# Patient Record
Sex: MALE | Race: WHITE | NOT HISPANIC OR LATINO | Employment: OTHER | URBAN - METROPOLITAN AREA
[De-identification: names, ages, dates, MRNs, and addresses within clinical notes are randomized per-mention and may not be internally consistent; named-entity substitution may affect disease eponyms.]

---

## 2017-04-13 ENCOUNTER — HOSPITAL ENCOUNTER (EMERGENCY)
Facility: HOSPITAL | Age: 57
Discharge: PSYCHIATRIC OR SUBSTANCE USE FACILITY | End: 2017-04-14
Attending: EMERGENCY MEDICINE
Payer: MEDICAID

## 2017-04-13 DIAGNOSIS — R45.851 SUICIDAL IDEATION: Primary | ICD-10-CM

## 2017-04-13 DIAGNOSIS — T50.902A DRUG OVERDOSE, INTENTIONAL SELF-HARM, INITIAL ENCOUNTER: ICD-10-CM

## 2017-04-13 DIAGNOSIS — R41.82 ALTERED MENTAL STATE: ICD-10-CM

## 2017-04-13 LAB
ALBUMIN SERPL BCP-MCNC: 3.4 G/DL
ALP SERPL-CCNC: 83 U/L
ALT SERPL W/O P-5'-P-CCNC: 18 U/L
AMPHET+METHAMPHET UR QL: NEGATIVE
ANION GAP SERPL CALC-SCNC: 6 MMOL/L
APAP SERPL-MCNC: <3 UG/ML
AST SERPL-CCNC: 14 U/L
BARBITURATES UR QL SCN>200 NG/ML: NEGATIVE
BASOPHILS # BLD AUTO: 0 K/UL
BASOPHILS NFR BLD: 0.1 %
BENZODIAZ UR QL SCN>200 NG/ML: NORMAL
BILIRUB SERPL-MCNC: 0.5 MG/DL
BILIRUB UR QL STRIP: NEGATIVE
BUN SERPL-MCNC: 15 MG/DL
BZE UR QL SCN: NEGATIVE
CALCIUM SERPL-MCNC: 9 MG/DL
CANNABINOIDS UR QL SCN: NEGATIVE
CHLORIDE SERPL-SCNC: 104 MMOL/L
CLARITY UR: CLEAR
CO2 SERPL-SCNC: 29 MMOL/L
COLOR UR: YELLOW
CREAT SERPL-MCNC: 1.3 MG/DL
CREAT UR-MCNC: 83 MG/DL
DIFFERENTIAL METHOD: ABNORMAL
EOSINOPHIL # BLD AUTO: 0.4 K/UL
EOSINOPHIL NFR BLD: 3.2 %
ERYTHROCYTE [DISTWIDTH] IN BLOOD BY AUTOMATED COUNT: 16.1 %
EST. GFR  (AFRICAN AMERICAN): >60 ML/MIN/1.73 M^2
EST. GFR  (NON AFRICAN AMERICAN): >60 ML/MIN/1.73 M^2
ETHANOL SERPL-MCNC: <10 MG/DL
GLUCOSE SERPL-MCNC: 110 MG/DL
GLUCOSE UR QL STRIP: NEGATIVE
HCT VFR BLD AUTO: 41.9 %
HGB BLD-MCNC: 13.4 G/DL
HGB UR QL STRIP: NEGATIVE
KETONES UR QL STRIP: NEGATIVE
LEUKOCYTE ESTERASE UR QL STRIP: NEGATIVE
LYMPHOCYTES # BLD AUTO: 1 K/UL
LYMPHOCYTES NFR BLD: 8.1 %
MCH RBC QN AUTO: 27.7 PG
MCHC RBC AUTO-ENTMCNC: 31.9 %
MCV RBC AUTO: 87 FL
METHADONE UR QL SCN>300 NG/ML: NEGATIVE
MONOCYTES # BLD AUTO: 0.4 K/UL
MONOCYTES NFR BLD: 3.2 %
NEUTROPHILS # BLD AUTO: 10.2 K/UL
NEUTROPHILS NFR BLD: 85.4 %
NITRITE UR QL STRIP: NEGATIVE
OPIATES UR QL SCN: NEGATIVE
PCP UR QL SCN>25 NG/ML: NEGATIVE
PH UR STRIP: 7 [PH] (ref 5–8)
PLATELET # BLD AUTO: 218 K/UL
PMV BLD AUTO: 7.7 FL
POTASSIUM SERPL-SCNC: 4.9 MMOL/L
PROT SERPL-MCNC: 7 G/DL
PROT UR QL STRIP: NEGATIVE
RBC # BLD AUTO: 4.83 M/UL
SALICYLATES SERPL-MCNC: <5 MG/DL
SODIUM SERPL-SCNC: 139 MMOL/L
SP GR UR STRIP: 1.01 (ref 1–1.03)
TOXICOLOGY INFORMATION: NORMAL
TSH SERPL DL<=0.005 MIU/L-ACNC: 0.71 UIU/ML
URN SPEC COLLECT METH UR: NORMAL
UROBILINOGEN UR STRIP-ACNC: NEGATIVE EU/DL
WBC # BLD AUTO: 11.9 K/UL

## 2017-04-13 PROCEDURE — 84443 ASSAY THYROID STIM HORMONE: CPT

## 2017-04-13 PROCEDURE — 80307 DRUG TEST PRSMV CHEM ANLYZR: CPT | Mod: 59

## 2017-04-13 PROCEDURE — 80329 ANALGESICS NON-OPIOID 1 OR 2: CPT

## 2017-04-13 PROCEDURE — 93005 ELECTROCARDIOGRAM TRACING: CPT

## 2017-04-13 PROCEDURE — 80320 DRUG SCREEN QUANTALCOHOLS: CPT

## 2017-04-13 PROCEDURE — 96374 THER/PROPH/DIAG INJ IV PUSH: CPT

## 2017-04-13 PROCEDURE — 85025 COMPLETE CBC W/AUTO DIFF WBC: CPT

## 2017-04-13 PROCEDURE — 99285 EMERGENCY DEPT VISIT HI MDM: CPT | Mod: 25

## 2017-04-13 PROCEDURE — 63600175 PHARM REV CODE 636 W HCPCS: Performed by: EMERGENCY MEDICINE

## 2017-04-13 PROCEDURE — 82570 ASSAY OF URINE CREATININE: CPT

## 2017-04-13 PROCEDURE — 80175 DRUG SCREEN QUAN LAMOTRIGINE: CPT

## 2017-04-13 PROCEDURE — 25000003 PHARM REV CODE 250: Performed by: EMERGENCY MEDICINE

## 2017-04-13 PROCEDURE — 93010 ELECTROCARDIOGRAM REPORT: CPT | Mod: ,,, | Performed by: INTERNAL MEDICINE

## 2017-04-13 PROCEDURE — 80053 COMPREHEN METABOLIC PANEL: CPT

## 2017-04-13 PROCEDURE — 81003 URINALYSIS AUTO W/O SCOPE: CPT

## 2017-04-13 PROCEDURE — 96361 HYDRATE IV INFUSION ADD-ON: CPT

## 2017-04-13 PROCEDURE — 80307 DRUG TEST PRSMV CHEM ANLYZR: CPT

## 2017-04-13 RX ORDER — METOPROLOL TARTRATE 50 MG/1
50 TABLET ORAL 2 TIMES DAILY
Status: ON HOLD | COMMUNITY
End: 2019-05-16 | Stop reason: HOSPADM

## 2017-04-13 RX ORDER — FLUOXETINE HYDROCHLORIDE 20 MG/1
20 CAPSULE ORAL DAILY
COMMUNITY
End: 2017-05-04

## 2017-04-13 RX ORDER — NALOXONE HCL 0.4 MG/ML
0.4 VIAL (ML) INJECTION
Status: COMPLETED | OUTPATIENT
Start: 2017-04-13 | End: 2017-04-13

## 2017-04-13 RX ORDER — ATORVASTATIN CALCIUM 20 MG/1
20 TABLET, FILM COATED ORAL DAILY
COMMUNITY
End: 2018-02-23

## 2017-04-13 RX ORDER — PANTOPRAZOLE SODIUM 40 MG/1
40 TABLET, DELAYED RELEASE ORAL DAILY
Status: ON HOLD | COMMUNITY
End: 2019-05-16 | Stop reason: HOSPADM

## 2017-04-13 RX ORDER — FOSINOPRIL SODIUM AND HYDROCHLOROTHIAZIDE 20; 12.5 MG/1; MG/1
1 TABLET ORAL DAILY
COMMUNITY
End: 2023-10-27

## 2017-04-13 RX ORDER — OXYCODONE AND ACETAMINOPHEN 10; 325 MG/1; MG/1
1 TABLET ORAL EVERY 8 HOURS PRN
COMMUNITY
End: 2017-05-04

## 2017-04-13 RX ORDER — LAMOTRIGINE 100 MG/1
100 TABLET ORAL 2 TIMES DAILY
COMMUNITY
End: 2017-05-04

## 2017-04-13 RX ORDER — DIAZEPAM 10 MG/1
10 TABLET ORAL EVERY 8 HOURS PRN
COMMUNITY
End: 2017-05-04

## 2017-04-13 RX ORDER — FERROUS SULFATE 325(65) MG
325 TABLET ORAL
COMMUNITY
End: 2023-10-27

## 2017-04-13 RX ADMIN — NALOXONE HYDROCHLORIDE 0.4 MG: 0.4 INJECTION, SOLUTION INTRAMUSCULAR; INTRAVENOUS; SUBCUTANEOUS at 09:04

## 2017-04-13 RX ADMIN — SODIUM CHLORIDE 1000 ML: 0.9 INJECTION, SOLUTION INTRAVENOUS at 09:04

## 2017-04-14 VITALS
WEIGHT: 272 LBS | DIASTOLIC BLOOD PRESSURE: 64 MMHG | HEIGHT: 71 IN | RESPIRATION RATE: 16 BRPM | OXYGEN SATURATION: 96 % | BODY MASS INDEX: 38.08 KG/M2 | SYSTOLIC BLOOD PRESSURE: 146 MMHG | TEMPERATURE: 97 F | HEART RATE: 58 BPM

## 2017-04-14 NOTE — ED NOTES
woke patient up and had him stand. Pt able to ambulate around the room with unsteady gait. Speech remains slightly slurred. Pt states he's unsure of which medicine to took today. Pt placed back on stretcher to sleep. Warm blankets given. Security monitoring patient.

## 2017-04-14 NOTE — ED NOTES
Ex-wife wants to be notified if pt gets transferred to another facility. Name is Awa [phone]. Also sister of patient is Holly Sheehan [phone]

## 2017-04-14 NOTE — ED PROVIDER NOTES
Encounter Date: 4/13/2017    SCRIBE #1 NOTE: I, Remigio Holder, am scribing for, and in the presence of,  Dr. Tolentino. I have scribed the entire note.       History     Chief Complaint   Patient presents with    Drug Overdose     found unresponsive by ex wife then became arousable and states he over took some medication; told EMS he doesn't want to live anymore     Review of patient's allergies indicates:  No Known Allergies  HPI Comments: 04/13/2017  7:48 PM     Chief Complaint: Drug OD       The patient is a 56 y.o. male who was in his normal state of health until yesterday when he began slurring his words, and was more somnolent than normal. His family member also reports that he fell today. Pt states he fell because he was weak. He also endorses taking 5 oxycodone yesterday because he wanted to commit suicide. He is very somnolent currently, and ROS was limited. There are no other complaints at this time. He has a past medical history of Depression; DVT; High cholesterol; and Hypertension. He has a past surgical history that includes Vascular surgery.          The history is provided by a relative.     Past Medical History:   Diagnosis Date    Depression     DVT (deep venous thrombosis)     left lower leg    High cholesterol     Hypertension      Past Surgical History:   Procedure Laterality Date    VASCULAR SURGERY      left lower leg     History reviewed. No pertinent family history.  Social History   Substance Use Topics    Smoking status: Former Smoker    Smokeless tobacco: None    Alcohol use No      Comment: Hx of drinking alot when younger     Review of Systems   Unable to perform ROS: Other   Constitutional: Negative for fever.   HENT: Negative for sore throat.    Eyes: Negative for visual disturbance.   Respiratory: Negative for shortness of breath.    Cardiovascular: Negative for chest pain.   Gastrointestinal: Negative for nausea.   Genitourinary: Negative for dysuria.   Musculoskeletal:  Negative for back pain.   Skin: Negative for rash.   Neurological: Negative for weakness.   Hematological: Does not bruise/bleed easily.   Psychiatric/Behavioral: Positive for suicidal ideas. Negative for confusion.       Physical Exam   Initial Vitals   BP Pulse Resp Temp SpO2   04/13/17 1848 04/13/17 1849 04/13/17 1902 -- 04/13/17 1849   132/103 61 16  96 %     Physical Exam    Nursing note and vitals reviewed.  Constitutional: He appears well-developed and well-nourished.  Non-toxic appearance. No distress.   HENT:   Head: Normocephalic and atraumatic.   Eyes: Pupils are equal, round, and reactive to light.   nystagmus on bilateral gaze. EOM intact      Neck: Normal range of motion. Neck supple. No rigidity. No JVD present.   Cardiovascular: Normal rate, regular rhythm, normal heart sounds and intact distal pulses.   Pulmonary/Chest: Breath sounds normal. No respiratory distress. He has no wheezes. He has no rhonchi. He has no rales. He exhibits no tenderness.   Abdominal: Soft. Bowel sounds are normal. He exhibits no distension. There is no tenderness. There is no rigidity, no rebound and no guarding.   Musculoskeletal: Normal range of motion.   Neurological: He is alert and oriented to person, place, and time. He has normal strength and normal reflexes. No cranial nerve deficit or sensory deficit. He exhibits normal muscle tone. Coordination normal. GCS eye subscore is 4. GCS verbal subscore is 5. GCS motor subscore is 6.   Skin: Skin is warm and dry.   Psychiatric: His speech is normal. He is not actively hallucinating.   Pt has suicidal thoughts, and reports he took handful of oxycodone to commit suicide.          ED Course   Procedures  Labs Reviewed   CBC W/ AUTO DIFFERENTIAL - Abnormal; Notable for the following:        Result Value    Hemoglobin 13.4 (*)     MCHC 31.9 (*)     RDW 16.1 (*)     MPV 7.7 (*)     Gran # 10.2 (*)     Gran% 85.4 (*)     Lymph% 8.1 (*)     Mono% 3.2 (*)     All other components  within normal limits   COMPREHENSIVE METABOLIC PANEL - Abnormal; Notable for the following:     Albumin 3.4 (*)     Anion Gap 6 (*)     All other components within normal limits   ACETAMINOPHEN LEVEL - Abnormal; Notable for the following:     Acetaminophen (Tylenol), Serum <3.0 (*)     All other components within normal limits   SALICYLATE LEVEL - Abnormal; Notable for the following:     Salicylate Lvl <5.0 (*)     All other components within normal limits   TSH   URINALYSIS   DRUG SCREEN PANEL, URINE EMERGENCY   ALCOHOL,MEDICAL (ETHANOL)   LAMOTRIGINE LEVEL     EKG Readings: (Independently Interpreted)   Initial Reading: No STEMI.   Sinus bradycardia, 54 BPM, normal ST segments, normal T waves, normal intervals.  normal axis       X-Rays:   Independently Interpreted Readings:   Chest X-Ray: No acute abnormalities. Poor expiratory film     Medical Decision Making:   History:   Old Medical Records: I decided to obtain old medical records.  Initial Assessment:   This is an emergent evaluation for psychiatric evaluation.  The pt presents for evaluation of drug overdose with suicidal ideation.    I feel the pt is suicidal and pt was placed under PEC with direct observation.  Medical workup was initiated to evaluate for organic etiologies.  Pt's etoh level was negative and tox screen was positive for benzodiazepines.  I believe his lethargy is likely secondary to Valium overdose.  I do not believe the pt has metabolic encephalopathy, CVA, severe electrolyte derrangement, drug induced temporary psychosis.    Patient was observed for over 8 hours in the emergency department with gradual improvement in his symptoms.  He is ambulated around the emergency department with assistance/support to the right side since he uses a cane to walk on the right side.  He is speaking clearly and much more alert now.  I believe he is medically clear for transfer to psychiatric facility.              Scribe Attestation:   Scribe #1: I  performed the above scribed service and the documentation accurately describes the services I performed. I attest to the accuracy of the note.    Attending Attestation:           Physician Attestation for Scribe:  Physician Attestation Statement for Scribe #1: I, Dr. Tolentino, reviewed documentation, as scribed by Remigio Holder in my presence, and it is both accurate and complete.                 ED Course     Clinical Impression:   The primary encounter diagnosis was Suicidal ideation. Diagnoses of Altered mental state and Drug overdose, intentional self-harm, initial encounter were also pertinent to this visit.          Deion Tolentino MD  04/14/17 0600

## 2017-04-14 NOTE — ED NOTES
Actively seeking inpatient placement. PEC packet faxed to multiple facilities by MONICA Matias. Awaiting call back.

## 2017-04-17 LAB — LAMOTRIGINE SERPL-MCNC: 10.6 UG/ML (ref 2–15)

## 2017-05-04 ENCOUNTER — HOSPITAL ENCOUNTER (EMERGENCY)
Facility: HOSPITAL | Age: 57
Discharge: HOME | End: 2017-05-04
Attending: EMERGENCY MEDICINE
Payer: MEDICAID

## 2017-05-04 VITALS
SYSTOLIC BLOOD PRESSURE: 153 MMHG | RESPIRATION RATE: 18 BRPM | DIASTOLIC BLOOD PRESSURE: 71 MMHG | BODY MASS INDEX: 37.93 KG/M2 | HEART RATE: 84 BPM | HEIGHT: 72 IN | WEIGHT: 280 LBS | TEMPERATURE: 98 F | OXYGEN SATURATION: 99 %

## 2017-05-04 DIAGNOSIS — M79.605 BILATERAL LEG PAIN: Primary | ICD-10-CM

## 2017-05-04 DIAGNOSIS — I73.9 PAD (PERIPHERAL ARTERY DISEASE): ICD-10-CM

## 2017-05-04 DIAGNOSIS — M79.604 BILATERAL LEG PAIN: Primary | ICD-10-CM

## 2017-05-04 PROCEDURE — 99284 EMERGENCY DEPT VISIT MOD MDM: CPT

## 2017-05-04 RX ORDER — QUETIAPINE FUMARATE 50 MG/1
50 TABLET, FILM COATED ORAL NIGHTLY
COMMUNITY
End: 2017-08-23

## 2017-05-04 RX ORDER — HYDROCODONE BITARTRATE AND ACETAMINOPHEN 5; 325 MG/1; MG/1
1 TABLET ORAL EVERY 4 HOURS PRN
Qty: 12 TABLET | Refills: 0 | Status: SHIPPED | OUTPATIENT
Start: 2017-05-04 | End: 2017-08-23

## 2017-05-04 RX ORDER — ASPIRIN 81 MG/1
81 TABLET ORAL DAILY
Status: ON HOLD | COMMUNITY
End: 2019-05-16 | Stop reason: HOSPADM

## 2017-05-04 RX ORDER — MIRTAZAPINE 15 MG/1
15 TABLET, FILM COATED ORAL NIGHTLY
COMMUNITY
End: 2017-08-23

## 2017-05-04 NOTE — ED AVS SNAPSHOT
[facility] [address] [place] [address]               Jamari Huerta MONI   2017  1:37 PM   ED    Description:  Male : 1960   Department:  [facility]           Your Care was Coordinated By:     Provider Role From To    Deion Tolentino MD Attending Provider [id number] --      Reason for Visit     Leg Pain           Diagnoses this Visit        Comments    Bilateral leg pain    -  Primary     PAD (peripheral artery disease)           ED Disposition     ED Disposition Condition Comment    Discharge             To Do List           Follow-up Information     Follow up with [facility]. Go in 1 day.    Specialties:  Neurosurgery, Plastic Surgery, Podiatry, Surgical Oncology, Allergy, Cardiothoracic Surgery, Otolaryngology, Gastroenterology, Breast Surgery, Oral Surgery, Oral and Maxillofacial Surgery, Cardiology, Bariatrics, Internal Medicine, Family Medicine, Colon and Rectal Surgery    Why:  for vascular surgery evaluation for claudication symptoms    Contact information:    [address] [place] [address]  [phone]          Follow up with [facility].    Specialty:  Emergency Medicine    Why:  As needed, If symptoms worsen    Contact information:    [address] [place] [id number] [phone]       These Medications        Disp Refills Start End    hydrocodone-acetaminophen 5-325mg (NORCO) 5-325 mg per tablet 12 tablet 0 2017     Take 1 tablet by mouth every 4 (four) hours as needed for Pain. - Oral    Pharmacy: [facility] [address] [place] [address] LONNIE SOARES AT [facility] [place] [facility] [place] Ph #: [phone]         [facility]     [facility] Nurse Care Line - 24/7 Assistance  Unless otherwise directed by your provider, please contact [facility], our nurse care line that is available for 24/7 assistance.     Registered  nurses in the [facility] provide: appointment scheduling, clinical advisement, health education, and other advisory services.  Call: [phone] (toll free)               Medications           Message regarding Medications     Verify the changes and/or additions to your medication regime listed below are the same as discussed with your clinician today.  If any of these changes or additions are incorrect, please notify your healthcare provider.        START taking these NEW medications        Refills    hydrocodone-acetaminophen 5-325mg (NORCO) 5-325 mg per tablet 0    Sig: Take 1 tablet by mouth every 4 (four) hours as needed for Pain.    Class: Print    Route: Oral      STOP taking these medications     fluoxetine (PROZAC) 20 MG capsule Take 20 mg by mouth once daily.    diazePAM (VALIUM) 10 MG Tab Take 10 mg by mouth every 8 (eight) hours as needed.    lamotrigine (LAMICTAL) 100 MG tablet Take 100 mg by mouth 2 (two) times daily.    oxycodone-acetaminophen (PERCOCET)  mg per tablet Take 1 tablet by mouth every 8 (eight) hours as needed for Pain.           Verify that the below list of medications is an accurate representation of the medications you are currently taking.  If none reported, the list may be blank. If incorrect, please contact your healthcare provider. Carry this list with you in case of emergency.           Current Medications     aspirin (ECOTRIN) 81 MG EC tablet Take 81 mg by mouth once daily.    atorvastatin (LIPITOR) 20 MG tablet Take 20 mg by mouth once daily.    ferrous sulfate 325 mg (65 mg iron) Tab tablet Take 325 mg by mouth daily with breakfast.    fosinopril-hydrochlorothiazide (MONOPRIL-HCT) 20-12.5 mg per tablet Take 1 tablet by mouth once daily.    metoprolol tartrate (LOPRESSOR) 50 MG tablet Take 50 mg by mouth 2 (two) times daily.    mirtazapine (REMERON) 15 MG tablet Take 15 mg by mouth every evening.    pantoprazole (PROTONIX) 40 MG tablet Take 40 mg by mouth  once daily.    quetiapine (SEROQUEL) 50 MG tablet Take 50 mg by mouth every evening.    hydrocodone-acetaminophen 5-325mg (NORCO) 5-325 mg per tablet Take 1 tablet by mouth every 4 (four) hours as needed for Pain.           Clinical Reference Information           Your Vitals Were     BP Pulse Temp Resp Height Weight    164/77 (BP Location: Right arm, Patient Position: Sitting) 91 98.1 °F (36.7 °C) (Oral) 18 6' (1.829 m) 127 kg (280 lb)    BMI                37.97 kg/m2          Allergies as of 5/4/2017        Reactions    Morphine Itching    Zyban [Bupropion Hcl (Smoking Deter)] Swelling      Immunizations Administered on Date of Encounter - 5/4/2017     None      ED Micro, Lab, POCT     None      ED Imaging Orders     Start Ordered       Status Ordering Provider    05/04/17 1434 05/04/17 1433  US Lower Extremity Veins Bilateral  1 time imaging      Final result     05/04/17 1434 05/04/17 1433  US Lower Extrem Arteries Bilat with CARLITO (xpd)  1 time imaging      Final result       Discharge References/Attachments     PERIPHERAL ARTERY DISEASE (PAD) (ENGLISH)      Your Scheduled Appointments     Jun 08, 2017  1:15 PM CDT   Established Patient Visit with Kvng Lombardo MD   [facility] ([facility])    [address]  [address]  [facility] [address] [phone]              Smoking Cessation     If you would like to quit smoking:   You may be eligible for free services if you are a [place] resident and started smoking cigarettes before September 1, 1988.  Call the Smoking Cessation Trust (SCT) toll free at (428) 564-4535 or (068) 827-8978.   Call 9-800-QUIT-NOW if you do not meet the above criteria.   Contact us via email: [email or web address]   View our website for more information: [email or web address]         [facility] complies with applicable Federal civil rights laws and does not discriminate on the basis of race, color, national  origin, age, disability, or sex.        Language Assistance Services     ATTENTION: Language assistance services are available, free of charge. Please call [phone].      ATENCIÓN: Si habsweta campos, tiene a rodriguez disposición servicios gratuitos de asistencia lingüística. Llame al [phone].     CHÚ Ý: N?u b?n nói Ti?ng Vi?t, có các d?ch v? h? tr? ngôn ng? mi?n phí dành cho b?n. G?i s? [phone].

## 2017-05-04 NOTE — ED PROVIDER NOTES
"Encounter Date: 5/4/2017    SCRIBE #1 NOTE: I, Tommy Bahena, am scribing for, and in the presence of, Dr. Tolentino.       History     Chief Complaint   Patient presents with    Leg Pain     Review of patient's allergies indicates:   Allergen Reactions    Morphine Itching    Zyban [bupropion hcl (smoking deter)] Swelling     HPI Comments: 05/04/2017  2:25 PM     Chief Complaint: Bilateral Calf Pain       The patient is a 56 y.o. male who has a past medical history of depression; DVT; HLD; and HTN is presenting c/o bilateral calf pain for the past couple of months that suddenly became "unbearable" today while pt was walking at home. He denied any fall or injury. He stated that the pain is worse when walking and is better when at rest. He also reported intermittent swelling and purple or blue discoloration to his legs when he was walking in the past. Pt seen by hematologist, Dr. 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 Pt has had a doppler done at out patient center in [place] that reported poor circulation. He has a PMHx of heart murmur and 3 leg surgeries as followed: Jan 2003, Jul 2002 and [place]. Pt is currently taking ASA. Pt has a past surgical history that includes Vascular surgery.    The history is provided by the patient.     Past Medical History:   Diagnosis Date    Depression     DVT (deep venous thrombosis)     left lower leg    High cholesterol     Hypertension      Past Surgical History:   Procedure Laterality Date    VASCULAR SURGERY      left lower leg     History reviewed. No pertinent family history.  Social History   Substance Use Topics    Smoking status: Former Smoker    Smokeless tobacco: None    Alcohol use No      Comment: Hx of drinking alot when younger     Review of Systems   Constitutional: Negative for fever.   HENT: Negative for sore throat.    Respiratory: Negative for shortness of breath.    Cardiovascular: " Negative for chest pain.   Gastrointestinal: Negative for nausea.   Genitourinary: Negative for dysuria.   Musculoskeletal: Positive for myalgias (bilateral calf pain ). Negative for back pain.   Skin: Negative for rash.   Neurological: Negative for weakness.   Hematological: Does not bruise/bleed easily.       Physical Exam   Initial Vitals   BP Pulse Resp Temp SpO2   05/04/17 1331 05/04/17 1331 05/04/17 1331 05/04/17 1331 --   164/77 91 18 98.1 °F (36.7 °C)      Physical Exam    Nursing note and vitals reviewed.  Constitutional: He appears well-developed and well-nourished.  Non-toxic appearance. No distress.   HENT:   Head: Normocephalic and atraumatic.   Eyes: EOM are normal. Pupils are equal, round, and reactive to light.   Neck: Normal range of motion. Neck supple. No rigidity. No JVD present.   Cardiovascular: Normal rate, regular rhythm and intact distal pulses.   Murmur heard.   Systolic murmur is present   Pulses:       Dorsalis pedis pulses are 1+ on the right side, and 1+ on the left side.   Pulmonary/Chest: Breath sounds normal.   Abdominal: There is no rigidity.   Musculoskeletal: He exhibits edema.   Very trace edema bilaterally to legs.   Neurological: He is alert and oriented to person, place, and time.   Skin: Skin is warm and dry.   2 sec capillary refill on right foot and left foot.  Large fasciotomy scar on inside of left calf.  Multiple scars on bilateral legs.       Psychiatric: He has a normal mood and affect. His speech is normal and behavior is normal. He is not actively hallucinating.         ED Course   Procedures  Labs Reviewed - No data to display          Medical Decision Making:   History:   Old Medical Records: I decided to obtain old medical records.  Initial Assessment:   56-year-old man with a history of hypercoagulable blood disorder with a history of previous left lower leg DVT and bypass surgeries resents for leg pain with ambulation is relieved with rest concerning for  claudication type symptoms.  He does have 1+ bilateral lower distal pulses with cap refill of approximately 2 seconds.  Ultrasound of his lower extremities is negative for DVT.  He has normal ABIs on the right but weren't able to be obtainable left.  He has equal warmth in the lower extremities.  He is not a pain currently.  I do not detect any signs of infection.  I'll provide him with referral for vascular surgery for further evaluation of possible PVD and claudication symptoms.  Return precautions discussed.  His discharge improved in no acute distress.            Scribe Attestation:   Scribe #1: I performed the above scribed service and the documentation accurately describes the services I performed. I attest to the accuracy of the note.    Attending Attestation:           Physician Attestation for Scribe:  Physician Attestation Statement for Scribe #1: I, Dr. Tolentino, reviewed documentation, as scribed by Tommy Bahena in my presence, and it is both accurate and complete.                 ED Course     Clinical Impression:   The primary encounter diagnosis was Bilateral leg pain. A diagnosis of PAD (peripheral artery disease) was also pertinent to this visit.          Deion Tolentino MD  05/04/17 0822

## 2017-08-09 RX ORDER — DOCUSATE SODIUM 100 MG/1
100 CAPSULE, LIQUID FILLED ORAL 2 TIMES DAILY
COMMUNITY
End: 2023-10-27

## 2017-08-09 RX ORDER — AMLODIPINE BESYLATE 5 MG/1
5 TABLET ORAL DAILY
COMMUNITY
End: 2018-03-22 | Stop reason: DRUGHIGH

## 2017-08-09 RX ORDER — FLUTICASONE PROPIONATE 50 MCG
1 SPRAY, SUSPENSION (ML) NASAL DAILY
Status: ON HOLD | COMMUNITY
End: 2019-05-16 | Stop reason: HOSPADM

## 2017-08-09 RX ORDER — DIAZEPAM 10 MG/1
10 TABLET ORAL
Status: ON HOLD | COMMUNITY
End: 2019-05-16 | Stop reason: HOSPADM

## 2017-08-09 RX ORDER — CLONIDINE HYDROCHLORIDE 0.1 MG/1
0.1 TABLET ORAL 2 TIMES DAILY
COMMUNITY
End: 2023-10-27

## 2017-08-09 RX ORDER — FLUOXETINE HYDROCHLORIDE 20 MG/1
20 CAPSULE ORAL DAILY
COMMUNITY
End: 2023-10-27

## 2017-08-09 RX ORDER — ROSUVASTATIN CALCIUM 20 MG/1
40 TABLET, COATED ORAL DAILY
Status: ON HOLD | COMMUNITY
End: 2019-05-16 | Stop reason: HOSPADM

## 2017-08-23 ENCOUNTER — OFFICE VISIT (OUTPATIENT)
Dept: PULMONOLOGY | Facility: CLINIC | Age: 57
End: 2017-08-23
Payer: MEDICAID

## 2017-08-23 VITALS
BODY MASS INDEX: 39.96 KG/M2 | DIASTOLIC BLOOD PRESSURE: 74 MMHG | OXYGEN SATURATION: 95 % | SYSTOLIC BLOOD PRESSURE: 130 MMHG | HEART RATE: 59 BPM | WEIGHT: 295 LBS | HEIGHT: 72 IN

## 2017-08-23 DIAGNOSIS — R93.89 ABNORMAL CT OF THE CHEST: ICD-10-CM

## 2017-08-23 DIAGNOSIS — R06.00 DYSPNEA, UNSPECIFIED TYPE: Primary | ICD-10-CM

## 2017-08-23 PROCEDURE — 3008F BODY MASS INDEX DOCD: CPT | Mod: ,,, | Performed by: INTERNAL MEDICINE

## 2017-08-23 PROCEDURE — 99204 OFFICE O/P NEW MOD 45 MIN: CPT | Mod: ,,, | Performed by: INTERNAL MEDICINE

## 2017-08-23 PROCEDURE — 3078F DIAST BP <80 MM HG: CPT | Mod: ,,, | Performed by: INTERNAL MEDICINE

## 2017-08-23 PROCEDURE — 3075F SYST BP GE 130 - 139MM HG: CPT | Mod: ,,, | Performed by: INTERNAL MEDICINE

## 2017-08-23 NOTE — PROGRESS NOTES
New Office Visit/Consultation Note    Patient Name: Jamari Huerta III  MRN: [id number]  : 1960      Reason for visit: Abnormal CT scan, dyspnea    HPI:     57 yo male was hospitalized in 2017 with MRSA bactermia and CT scan which is consistent with septic emboli.  He was treated with antibiotics and is felt to be cleared.  He also has MASTERS not worsening but he gets sob with minimal exertion, no definite cough, congestion, wheezing, chest tightness.  He denies any h/o cardiac disease except for surgery on aorta (age 22) for what sounds like coarctation of the aorta.  He sees Dr Flynn regularly.  He does not remember ever having a PFT done.  He smoked 1-2 PPD for about 35 years and quit early .  He denies any known h/o sleep apnea but is high risk (male > 50, overweight, + snoring, + EDS) and will likely need sleep study to evaluate.    Past Medical History    Past Medical History:   Diagnosis Date    Abnormal PFT     Cardiac murmur     Cardiomegaly     CKD (chronic kidney disease)     Coronary artery disease     Depression     DVT (deep venous thrombosis)     left lower leg    Dyspnea     Edema     GI bleed     High cholesterol     Hypertension     PVD (peripheral vascular disease)        Past Surgical History    Past Surgical History:   Procedure Laterality Date    VASCULAR SURGERY      left lower leg       Medications      Current Outpatient Prescriptions:     amlodipine (NORVASC) 5 MG tablet, Take 5 mg by mouth once daily., Disp: , Rfl:     aspirin (ECOTRIN) 81 MG EC tablet, Take 81 mg by mouth once daily., Disp: , Rfl:     atorvastatin (LIPITOR) 20 MG tablet, Take 20 mg by mouth once daily., Disp: , Rfl:     cloNIDine (CATAPRES) 0.1 MG tablet, Take 0.1 mg by mouth 2 (two) times daily., Disp: , Rfl:     diazePAM (VALIUM) 10 MG Tab, Take 10 mg by mouth., Disp: , Rfl:     docusate sodium (COLACE) 100 MG capsule, Take 100 mg by mouth 2 (two) times daily., Disp: , Rfl:     ferrous  sulfate 325 mg (65 mg iron) Tab tablet, Take 325 mg by mouth daily with breakfast., Disp: , Rfl:     fluoxetine (PROZAC) 20 MG capsule, Take 20 mg by mouth once daily., Disp: , Rfl:     fluticasone (FLONASE) 50 mcg/actuation nasal spray, 1 spray by Each Nare route once daily., Disp: , Rfl:     fosinopril-hydrochlorothiazide (MONOPRIL-HCT) 20-12.5 mg per tablet, Take 1 tablet by mouth once daily., Disp: , Rfl:     metoprolol tartrate (LOPRESSOR) 50 MG tablet, Take 50 mg by mouth 2 (two) times daily., Disp: , Rfl:     pantoprazole (PROTONIX) 40 MG tablet, Take 40 mg by mouth once daily., Disp: , Rfl:     rosuvastatin (CRESTOR) 20 MG tablet, Take 40 mg by mouth once daily., Disp: , Rfl:     Allergies    Review of patient's allergies indicates:   Allergen Reactions    Morphine Itching    Plavix [clopidogrel]      bleeding    Zyban [bupropion hcl (smoking deter)] Swelling       SocHx    History   Smoking Status    Former Smoker    Types: Cigarettes    Quit date: 1/1/2017   Smokeless Tobacco    Never Used       History   Alcohol Use No     Comment: Hx of drinking alot when younger       Drug Use - 0  Occupation - disabled, 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 Negative for chills, diaphoresis, fever and weight loss.   HENT: Negative for congestion.    Eyes: Negative for pain.   Respiratory: Positive for shortness of breath. Negative for cough, hemoptysis, sputum production, wheezing and stridor.    Cardiovascular: Negative for chest pain, palpitations, orthopnea, claudication, leg swelling and PND.   Gastrointestinal: Negative for abdominal pain, constipation, diarrhea, heartburn, nausea and vomiting.   Genitourinary: Negative for dysuria, frequency and urgency.   Musculoskeletal: Negative for falls and myalgias.   Skin: Negative for itching and rash.   Neurological: Negative for sensory change,  focal weakness and weakness.   Psychiatric/Behavioral: Negative for depression. The patient is not nervous/anxious.        Physical Exam    Vitals:    08/23/17 1319   BP: 130/74   Pulse: (!) 59   SpO2: 95%   Weight: 133.8 kg (295 lb)   Height: 6' (1.829 m)       Physical Exam   Constitutional: He is oriented to person, place, and time. He appears well-developed and well-nourished. No distress.   obese   HENT:   Head: Normocephalic and atraumatic.   Nose: Nose normal.   Mouth/Throat: Oropharynx is clear and moist.   Eyes: EOM are normal. Pupils are equal, round, and reactive to light.   Neck: Normal range of motion. Neck supple. No JVD present. No tracheal deviation present. No thyromegaly present.   Cardiovascular: Normal rate, regular rhythm, normal heart sounds and intact distal pulses.  Exam reveals no gallop and no friction rub.    No murmur heard.  Pulmonary/Chest: Effort normal and breath sounds normal. No stridor. No respiratory distress. He has no wheezes. He has no rales. He exhibits no tenderness.   Decreased at bases   Abdominal: Soft. Bowel sounds are normal. He exhibits no distension.   obese   Musculoskeletal: Normal range of motion. He exhibits no edema or tenderness.   Lymphadenopathy:     He has no cervical adenopathy.   Neurological: He is alert and oriented to person, place, and time. He has normal reflexes. No cranial nerve deficit.   Skin: He is not diaphoretic.   Psychiatric: He has a normal mood and affect. His behavior is normal.   Nursing note and vitals reviewed.      Labs    Lab Results   Component Value Date    WBC 11.90 04/13/2017    HGB 13.4 (L) 04/13/2017    HCT 41.9 04/13/2017     04/13/2017       Sodium   Date Value Ref Range Status   04/13/2017 139 136 - 145 mmol/L Final     Potassium   Date Value Ref Range Status   04/13/2017 4.9 3.5 - 5.1 mmol/L Final     Chloride   Date Value Ref Range Status   04/13/2017 104 95 - 110 mmol/L Final     CO2   Date Value Ref Range Status    04/13/2017 29 23 - 29 mmol/L Final     Glucose   Date Value Ref Range Status   04/13/2017 110 70 - 110 mg/dL Final     BUN, Bld   Date Value Ref Range Status   04/13/2017 15 6 - 20 mg/dL Final     Creatinine   Date Value Ref Range Status   04/13/2017 1.3 0.5 - 1.4 mg/dL Final     Calcium   Date Value Ref Range Status   04/13/2017 9.0 8.7 - 10.5 mg/dL Final     Total Protein   Date Value Ref Range Status   04/13/2017 7.0 6.0 - 8.4 g/dL Final     Albumin   Date Value Ref Range Status   04/13/2017 3.4 (L) 3.5 - 5.2 g/dL Final     Total Bilirubin   Date Value Ref Range Status   04/13/2017 0.5 0.1 - 1.0 mg/dL Final     Comment:     For infants and newborns, interpretation of results should be based  on gestational age, weight and in agreement with clinical  observations.  Premature Infant recommended reference ranges:  Up to 24 hours.............<8.0 mg/dL  Up to 48 hours............<12.0 mg/dL  3-5 days..................<15.0 mg/dL  6-29 days.................<15.0 mg/dL       Alkaline Phosphatase   Date Value Ref Range Status   04/13/2017 83 55 - 135 U/L Final     AST   Date Value Ref Range Status   04/13/2017 14 10 - 40 U/L Final     ALT   Date Value Ref Range Status   04/13/2017 18 10 - 44 U/L Final     Anion Gap   Date Value Ref Range Status   04/13/2017 6 (L) 8 - 16 mmol/L Final       Xrays    The one view chest  Comparison study: 4/3/2014    The cardiomediastinal silhouette is stable.  Lungs remain clear of confluent infiltrate.  There is no pleural effusion   Impression         No significant interval change compared to the prior exam      Electronically signed by: KIMBERLEE APONTE MD  Date: 04/14/17  Time: 08:55          Impression/Plan    Problem List Items Addressed This Visit        Other    Dyspnea - Primary  - needs evaluation  - likely multifactorial - weight, deconditioning, COPD    Relevant Orders    Complete PFT with bronchodilator    Abnormal CT of the chest  - likely septic emboli to lungs  - saritha repeat  CT and further decision based on that study  - RTC 1 month    Relevant Orders    CT Chest Without Contrast      Other Visit Diagnoses    None.         I have spent about 45 minutes with the patient taking the history and examining the patient.  We have discussed the diagnoses and current plan and all questions have been answered.  We have discussed the follow up plan.  The patient and family (if present) know to contact the office with any questions they may have.        Kvng Coles MD

## 2017-09-12 ENCOUNTER — TELEPHONE (OUTPATIENT)
Dept: PULMONOLOGY | Facility: CLINIC | Age: 57
End: 2017-09-12

## 2017-09-28 ENCOUNTER — OFFICE VISIT (OUTPATIENT)
Dept: PULMONOLOGY | Facility: CLINIC | Age: 57
End: 2017-09-28
Payer: MEDICAID

## 2017-09-28 VITALS
HEIGHT: 72 IN | OXYGEN SATURATION: 92 % | SYSTOLIC BLOOD PRESSURE: 124 MMHG | BODY MASS INDEX: 38.33 KG/M2 | HEART RATE: 79 BPM | WEIGHT: 283 LBS | DIASTOLIC BLOOD PRESSURE: 80 MMHG

## 2017-09-28 DIAGNOSIS — R93.89 ABNORMAL CT OF THE CHEST: Primary | ICD-10-CM

## 2017-09-28 DIAGNOSIS — R06.00 DYSPNEA, UNSPECIFIED TYPE: ICD-10-CM

## 2017-09-28 PROCEDURE — 3074F SYST BP LT 130 MM HG: CPT | Mod: ,,, | Performed by: INTERNAL MEDICINE

## 2017-09-28 PROCEDURE — 3008F BODY MASS INDEX DOCD: CPT | Mod: ,,, | Performed by: INTERNAL MEDICINE

## 2017-09-28 PROCEDURE — 99214 OFFICE O/P EST MOD 30 MIN: CPT | Mod: ,,, | Performed by: INTERNAL MEDICINE

## 2017-09-28 PROCEDURE — 3079F DIAST BP 80-89 MM HG: CPT | Mod: ,,, | Performed by: INTERNAL MEDICINE

## 2017-09-28 NOTE — PROGRESS NOTES
Office Visit    HPI:    Here for follow up, had PFT which were normal, had CT chest which showed significant improvement in nodules likely representing septic emboli.  He continues to have the sense that he needs to take a deep breath (d/w pt about sighs and about the need for regular exercise).    Medications      Current Outpatient Prescriptions:     amlodipine (NORVASC) 5 MG tablet, Take 5 mg by mouth once daily., Disp: , Rfl:     aspirin (ECOTRIN) 81 MG EC tablet, Take 81 mg by mouth once daily., Disp: , Rfl:     atorvastatin (LIPITOR) 20 MG tablet, Take 20 mg by mouth once daily., Disp: , Rfl:     cloNIDine (CATAPRES) 0.1 MG tablet, Take 0.1 mg by mouth 2 (two) times daily., Disp: , Rfl:     diazePAM (VALIUM) 10 MG Tab, Take 10 mg by mouth., Disp: , Rfl:     docusate sodium (COLACE) 100 MG capsule, Take 100 mg by mouth 2 (two) times daily., Disp: , Rfl:     ferrous sulfate 325 mg (65 mg iron) Tab tablet, Take 325 mg by mouth daily with breakfast., Disp: , Rfl:     fluoxetine (PROZAC) 20 MG capsule, Take 20 mg by mouth once daily., Disp: , Rfl:     fluticasone (FLONASE) 50 mcg/actuation nasal spray, 1 spray by Each Nare route once daily., Disp: , Rfl:     fosinopril-hydrochlorothiazide (MONOPRIL-HCT) 20-12.5 mg per tablet, Take 1 tablet by mouth once daily., Disp: , Rfl:     metoprolol tartrate (LOPRESSOR) 50 MG tablet, Take 50 mg by mouth 2 (two) times daily., Disp: , Rfl:     pantoprazole (PROTONIX) 40 MG tablet, Take 40 mg by mouth once daily., Disp: , Rfl:     rosuvastatin (CRESTOR) 20 MG tablet, Take 40 mg by mouth once daily., Disp: , Rfl:     Allergies    Review of patient's allergies indicates:   Allergen Reactions    Morphine Itching    Plavix [clopidogrel]      bleeding    Zyban [bupropion hcl (smoking deter)] Swelling       Social History    History   Smoking Status    Former Smoker    Types: Cigarettes    Quit date: 1/1/2017   Smokeless Tobacco    Never Used       ETOH - 0 drinks  per week.    Drug Use - 0    Occupation - disabled due to leg issues    Family History    Family History   Problem Relation Age of Onset    Diabetes Mother        ROS  Review of Systems   Constitutional: Negative for chills, diaphoresis, fever, malaise/fatigue and weight loss.   HENT: Negative for congestion.    Eyes: Negative for pain.   Respiratory: Negative for cough, hemoptysis, sputum production, shortness of breath, wheezing and stridor.    Cardiovascular: Negative for chest pain, palpitations, orthopnea, claudication, leg swelling and PND.   Gastrointestinal: Negative for abdominal pain, constipation, diarrhea, heartburn, nausea and vomiting.   Genitourinary: Negative for dysuria, frequency and urgency.   Musculoskeletal: Negative for falls and myalgias.   Neurological: Negative for sensory change, focal weakness and weakness.   Psychiatric/Behavioral: Negative for depression. The patient is not nervous/anxious.        Physical Exam    Vitals:    09/28/17 1322   BP: 124/80   Pulse: 79   SpO2: (!) 92%   Weight: 128.4 kg (283 lb)   Height: 6' (1.829 m)       Physical Exam   Constitutional: He is oriented to person, place, and time. He appears well-developed and well-nourished. No distress.   HENT:   Head: Normocephalic and atraumatic.   Nose: Nose normal.   Mouth/Throat: Oropharynx is clear and moist.   Eyes: EOM are normal. Pupils are equal, round, and reactive to light.   Neck: Normal range of motion. Neck supple. No JVD present. No tracheal deviation present. No thyromegaly present.   Cardiovascular: Normal rate, regular rhythm, normal heart sounds and intact distal pulses.  Exam reveals no gallop and no friction rub.    No murmur heard.  Pulmonary/Chest: Effort normal and breath sounds normal. No stridor. No respiratory distress. He has no wheezes. He has no rales. He exhibits no tenderness.   Abdominal: Soft. Bowel sounds are normal. He exhibits no distension.   Musculoskeletal: Normal range of motion. He  exhibits no edema or tenderness.   Lymphadenopathy:     He has no cervical adenopathy.   Neurological: He is alert and oriented to person, place, and time. He has normal reflexes. No cranial nerve deficit.   Skin: He is not diaphoretic.   Psychiatric: He has a normal mood and affect. His behavior is normal.   Nursing note and vitals reviewed.      Lab    @RESUFAST (WBC,HGB,HCT,PLT)@    Sodium   Date Value Ref Range Status   04/13/2017 139 136 - 145 mmol/L Final     Potassium   Date Value Ref Range Status   04/13/2017 4.9 3.5 - 5.1 mmol/L Final     Chloride   Date Value Ref Range Status   04/13/2017 104 95 - 110 mmol/L Final     CO2   Date Value Ref Range Status   04/13/2017 29 23 - 29 mmol/L Final     Glucose   Date Value Ref Range Status   04/13/2017 110 70 - 110 mg/dL Final     BUN, Bld   Date Value Ref Range Status   04/13/2017 15 6 - 20 mg/dL Final     Creatinine   Date Value Ref Range Status   04/13/2017 1.3 0.5 - 1.4 mg/dL Final     Calcium   Date Value Ref Range Status   04/13/2017 9.0 8.7 - 10.5 mg/dL Final     Total Protein   Date Value Ref Range Status   04/13/2017 7.0 6.0 - 8.4 g/dL Final     Albumin   Date Value Ref Range Status   04/13/2017 3.4 (L) 3.5 - 5.2 g/dL Final     Total Bilirubin   Date Value Ref Range Status   04/13/2017 0.5 0.1 - 1.0 mg/dL Final     Comment:     For infants and newborns, interpretation of results should be based  on gestational age, weight and in agreement with clinical  observations.  Premature Infant recommended reference ranges:  Up to 24 hours.............<8.0 mg/dL  Up to 48 hours............<12.0 mg/dL  3-5 days..................<15.0 mg/dL  6-29 days.................<15.0 mg/dL       Alkaline Phosphatase   Date Value Ref Range Status   04/13/2017 83 55 - 135 U/L Final     AST   Date Value Ref Range Status   04/13/2017 14 10 - 40 U/L Final     ALT   Date Value Ref Range Status   04/13/2017 18 10 - 44 U/L Final     Anion Gap   Date Value Ref Range Status   04/13/2017 6  (L) 8 - 16 mmol/L Final         Xray        Impression/Plan    Problem List Items Addressed This Visit        Other    Dyspnea  - needs to exercise more and get in better shape  - nothing to suggest COPD or restrictive lung disease  - RTC 6 months or call if symptoms worsen    Abnormal CT of the chest - Primary  - better, due to septic emboli  - will repeat after next visit      Other Visit Diagnoses    None.

## 2018-02-23 ENCOUNTER — HOSPITAL ENCOUNTER (EMERGENCY)
Facility: HOSPITAL | Age: 58
Discharge: HOME | End: 2018-02-23
Attending: EMERGENCY MEDICINE
Payer: MEDICAID

## 2018-02-23 VITALS
WEIGHT: 288 LBS | HEART RATE: 84 BPM | SYSTOLIC BLOOD PRESSURE: 153 MMHG | HEIGHT: 72 IN | RESPIRATION RATE: 18 BRPM | TEMPERATURE: 98 F | BODY MASS INDEX: 39.01 KG/M2 | DIASTOLIC BLOOD PRESSURE: 75 MMHG | OXYGEN SATURATION: 96 %

## 2018-02-23 DIAGNOSIS — K64.4 INTERNAL AND EXTERNAL BLEEDING HEMORRHOIDS: Primary | ICD-10-CM

## 2018-02-23 DIAGNOSIS — K64.8 INTERNAL AND EXTERNAL BLEEDING HEMORRHOIDS: Primary | ICD-10-CM

## 2018-02-23 PROBLEM — K64.1 GRADE II HEMORRHOIDS: Status: ACTIVE | Noted: 2018-02-23

## 2018-02-23 LAB
BASOPHILS # BLD AUTO: 0 K/UL
BASOPHILS NFR BLD: 0.3 %
DIFFERENTIAL METHOD: ABNORMAL
EOSINOPHIL # BLD AUTO: 0.3 K/UL
EOSINOPHIL NFR BLD: 3.9 %
ERYTHROCYTE [DISTWIDTH] IN BLOOD BY AUTOMATED COUNT: 16.6 %
HCT VFR BLD AUTO: 41 %
HGB BLD-MCNC: 13.3 G/DL
LYMPHOCYTES # BLD AUTO: 1.1 K/UL
LYMPHOCYTES NFR BLD: 13.1 %
MCH RBC QN AUTO: 26.5 PG
MCHC RBC AUTO-ENTMCNC: 32.5 G/DL
MCV RBC AUTO: 82 FL
MONOCYTES # BLD AUTO: 0.4 K/UL
MONOCYTES NFR BLD: 5.3 %
NEUTROPHILS # BLD AUTO: 6.5 K/UL
NEUTROPHILS NFR BLD: 77.4 %
PLATELET # BLD AUTO: 278 K/UL
PMV BLD AUTO: 7.7 FL
RBC # BLD AUTO: 5.03 M/UL
WBC # BLD AUTO: 8.4 K/UL

## 2018-02-23 PROCEDURE — 99284 EMERGENCY DEPT VISIT MOD MDM: CPT

## 2018-02-23 PROCEDURE — 85025 COMPLETE CBC W/AUTO DIFF WBC: CPT

## 2018-02-23 PROCEDURE — 36415 COLL VENOUS BLD VENIPUNCTURE: CPT

## 2018-02-24 NOTE — ED PROVIDER NOTES
Encounter Date: 2/23/2018    SCRIBE #1 NOTE: I, Brook Herman, am scribing for, and in the presence of, YOLANDA Cohen.       History     Chief Complaint   Patient presents with    Hemorrhoids     pt reports bleeding for 3 days - [facility], given meds can not get rx until Monday -        02/23/2018 10:13 PM     Chief complaint: Hemorrhoids       Jamari Huerta III is a 57 y.o. male with PMHx of HTN, DVT, PVD, CKD, and cardiomegaly who presents to the ED with complaints of hemorrhoids. He states having constant bleeding that started x2 days ago. The patient reports visiting his PCP, Dr. Beatrice Wei today and was prescribed a medication that he is unable to fill until Monday (2/26). Current medications includes Norvasc, Ecotrin, Lipitor, Keflex, and Protonix. He denies being on any blood thinners. The patient has another appointment with his PCP on Tuesday (2/27). He denies onset of any other symptoms and currently has no other medical concerns.       The history is provided by the patient.     Review of patient's allergies indicates:   Allergen Reactions    Morphine Itching    Plavix [clopidogrel]      bleeding    Zyban [bupropion hcl (smoking deter)] Swelling     Past Medical History:   Diagnosis Date    Abnormal PFT     Cardiac murmur     Cardiomegaly     CKD (chronic kidney disease)     Coronary artery disease     Depression     DVT (deep venous thrombosis)     left lower leg    Dyspnea     Edema     GI bleed     High cholesterol     Hypertension     PVD (peripheral vascular disease)      Past Surgical History:   Procedure Laterality Date    VASCULAR SURGERY      left lower leg     Family History   Problem Relation Age of Onset    Diabetes Mother      Social History   Substance Use Topics    Smoking status: Former Smoker     Types: Cigarettes     Quit date: 1/1/2017    Smokeless tobacco: Never Used    Alcohol use No      Comment: Hx of drinking alot when younger     Review of  Systems   Constitutional: Negative for fever.   HENT: Negative for congestion and sore throat.    Respiratory: Negative for cough and shortness of breath.    Cardiovascular: Negative for chest pain.   Gastrointestinal: Negative for diarrhea and nausea.   Genitourinary:        Hemorrhoids   Musculoskeletal: Negative for back pain.   Skin: Negative for rash.   Neurological: Negative for weakness.   Hematological: Does not bruise/bleed easily.       Physical Exam     Initial Vitals [02/23/18 2155]   BP Pulse Resp Temp SpO2   (!) 153/75 84 18 98.3 °F (36.8 °C) 96 %      MAP       101         Physical Exam    Nursing note and vitals reviewed.  Constitutional: Vital signs are normal. He appears well-developed and well-nourished. He is not diaphoretic. No distress.   HENT:   Head: Normocephalic and atraumatic.   Eyes: Pupils are equal, round, and reactive to light.   Neck: Neck supple.   Cardiovascular: Normal rate, regular rhythm, normal heart sounds and intact distal pulses.   No murmur heard.  Pulmonary/Chest: Breath sounds normal.   Abdominal: Soft. Normal appearance and bowel sounds are normal. He exhibits no distension. There is no tenderness.   Genitourinary: Rectal exam shows no fissure.   Genitourinary Comments: 4 small circumferential prolapsed hemorrhoids with scant amount of bleeding coming from where hemorrhoid meets the left gluteal fold with dark red clot noted. No palpable abscess or fissure on rectal exam. No surrounding erythema or cellulitis.    Neurological: He is alert and oriented to person, place, and time. He has normal strength.   Skin: Skin is warm, dry and intact.   Psychiatric: He has a normal mood and affect. His speech is normal and behavior is normal.         ED Course   Procedures  Labs Reviewed - No data to display          Medical Decision Making:   Differential Diagnosis:   Anemia secondary to ABL  Abscess  Fistula        APC / Resident Notes:   Patient is a 57 y.o. male who presents to  the ED 02/23/2018 who underwent emergent evaluation for bleeding hemorrhoids.  Patient seen today by general surgeon who recommended stool softener and witch hazel and surgery next week.  On exam patient has 4 small circumferential prolapsed hemorrhoids with scant amount of bleeding coming from where hemorrhoid meets the left gluteal fold with dark red clot noted. No palpable abscess or fissure on rectal exam. No surrounding erythema or cellulitis. Quick clot applied to bleeding area with resolution of bleeding. Monitored in ED for 1 hour without bleeding. CBC without anemia. Patient is currently on Bactrim as prescribed per surgeon. There are no signs of infection, cellulitis, or abscess. No leukocytosis. Afebrile. Vital signs normal.. Based on my clinical evaluation, I do not appreciate any immediate, emergent, or life threatening condition or etiology that warrants additional workup today and feel that the patient can be discharged with close follow up care. Case discussed with Dr. Maier who is agreeable to plan of care. Follow up and return precautions discussed; patient verbalized understanding and is agreeable to plan of care. Patient discharged home in stable condition.          Scribe Attestation:   Scribe #1: I performed the above scribed service and the documentation accurately describes the services I performed. I attest to the accuracy of the note.    Attending Attestation:           Physician Attestation for Scribe:  Physician Attestation Statement for Scribe #1: I, Talia Zarate, reviewed documentation, as scribed by in my presence, and it is both accurate and complete.     Comments: I, MARISELA Cohen, personally performed the services described in this documentation. All medical record entries made by the scribe were at my direction and in my presence.  I have reviewed the chart and agree that the record reflects my personal performance and is accurate and complete. MARISELA Cohen.  11:15 PM  02/23/2018                  Clinical Impression:   The encounter diagnosis was Internal and external bleeding hemorrhoids.    Disposition:   Disposition: Discharged  Condition: Stable                        Talia Zarate NP  02/23/18 2772

## 2018-02-24 NOTE — DISCHARGE INSTRUCTIONS
Leave quick clot dressing in place overnight; continue stool softener, witch hazel and sitz baths as directed. Continue antibiotics as directed.

## 2018-02-28 PROBLEM — K64.1 GRADE II HEMORRHOIDS: Status: RESOLVED | Noted: 2018-02-23 | Resolved: 2018-02-28

## 2018-03-22 ENCOUNTER — OFFICE VISIT (OUTPATIENT)
Dept: PULMONOLOGY | Facility: CLINIC | Age: 58
End: 2018-03-22
Payer: MEDICAID

## 2018-03-22 VITALS
WEIGHT: 279 LBS | BODY MASS INDEX: 37.79 KG/M2 | OXYGEN SATURATION: 97 % | HEIGHT: 72 IN | DIASTOLIC BLOOD PRESSURE: 74 MMHG | HEART RATE: 71 BPM | SYSTOLIC BLOOD PRESSURE: 144 MMHG

## 2018-03-22 DIAGNOSIS — R93.89 ABNORMAL CT OF THE CHEST: Primary | ICD-10-CM

## 2018-03-22 DIAGNOSIS — R06.02 SHORTNESS OF BREATH: ICD-10-CM

## 2018-03-22 PROCEDURE — 99213 OFFICE O/P EST LOW 20 MIN: CPT | Mod: ,,, | Performed by: INTERNAL MEDICINE

## 2018-03-22 RX ORDER — AMLODIPINE BESYLATE 10 MG/1
TABLET ORAL
COMMUNITY
Start: 2018-03-07 | End: 2023-10-27

## 2018-03-22 NOTE — PROGRESS NOTES
Office Visit    HPI:    9/28/2017 - Here for follow up, had PFT which were normal, had CT chest which showed significant improvement in nodules likely representing septic emboli.  He continues to have the sense that he needs to take a deep breath (d/w pt about sighs and about the need for regular exercise).    3/22/2018 - Here for follow up, doing better in general, no new pulmonary complaints.  Needs to get repeat CT chest to follow up on nodules (c/wseptic emboli).    Medications      Current Outpatient Prescriptions:     amLODIPine (NORVASC) 10 MG tablet, , Disp: , Rfl:     aspirin (ECOTRIN) 81 MG EC tablet, Take 81 mg by mouth once daily., Disp: , Rfl:     atorvastatin (LIPITOR) 80 MG tablet, Take 80 mg by mouth once daily., Disp: , Rfl: 2    cloNIDine (CATAPRES) 0.1 MG tablet, Take 0.1 mg by mouth 2 (two) times daily., Disp: , Rfl:     diazePAM (VALIUM) 10 MG Tab, Take 10 mg by mouth., Disp: , Rfl:     docusate sodium (COLACE) 100 MG capsule, Take 100 mg by mouth 2 (two) times daily., Disp: , Rfl:     ferrous sulfate 325 mg (65 mg iron) Tab tablet, Take 325 mg by mouth daily with breakfast., Disp: , Rfl:     fluoxetine (PROZAC) 20 MG capsule, Take 20 mg by mouth once daily., Disp: , Rfl:     fluticasone (FLONASE) 50 mcg/actuation nasal spray, 1 spray by Each Nare route once daily., Disp: , Rfl:     fosinopril-hydrochlorothiazide (MONOPRIL-HCT) 20-12.5 mg per tablet, Take 1 tablet by mouth once daily., Disp: , Rfl:     lidocaine HCL 2% (XYLOCAINE) 2 % jelly, Apply topically as needed., Disp: 30 mL, Rfl: 0    metoprolol tartrate (LOPRESSOR) 50 MG tablet, Take 50 mg by mouth 2 (two) times daily., Disp: , Rfl:     pantoprazole (PROTONIX) 40 MG tablet, Take 40 mg by mouth once daily., Disp: , Rfl:     pramoxine 1 % Foam, Place rectally 3 (three) times daily as needed., Disp: , Rfl: 0    rosuvastatin (CRESTOR) 20 MG tablet, Take 40 mg by mouth once daily., Disp: , Rfl:     Allergies    Review of  patient's allergies indicates:   Allergen Reactions    Morphine Itching    Plavix [clopidogrel]      bleeding    Zyban [bupropion hcl (smoking deter)] Swelling       Social History    History   Smoking Status    Former Smoker    Types: Cigarettes    Quit date: 1/1/2017   Smokeless Tobacco    Never Used       ETOH - 0 drinks per week.    Drug Use - 0    Occupation - disabled due to leg issues    Family History    Family History   Problem Relation Age of Onset    Diabetes Mother        ROS  Review of Systems   Constitutional: Negative for chills, diaphoresis, fever, malaise/fatigue and weight loss.   HENT: Negative for congestion.    Eyes: Negative for pain.   Respiratory: Negative for cough, hemoptysis, sputum production, shortness of breath, wheezing and stridor.    Cardiovascular: Negative for chest pain, palpitations, orthopnea, claudication, leg swelling and PND.   Gastrointestinal: Negative for abdominal pain, constipation, diarrhea, heartburn, nausea and vomiting.   Genitourinary: Negative for dysuria, frequency and urgency.   Musculoskeletal: Negative for falls and myalgias.   Neurological: Negative for sensory change, focal weakness and weakness.   Psychiatric/Behavioral: Negative for depression. The patient is not nervous/anxious.        Physical Exam    Vitals:    03/22/18 1105   BP: (!) 144/74   Pulse: 71   SpO2: 97%   Weight: 126.6 kg (279 lb)   Height: 6' (1.829 m)       Physical Exam   Constitutional: He is oriented to person, place, and time. He appears well-developed and well-nourished. No distress.   HENT:   Head: Normocephalic and atraumatic.   Nose: Nose normal.   Mouth/Throat: Oropharynx is clear and moist.   Eyes: EOM are normal. Pupils are equal, round, and reactive to light.   Neck: Normal range of motion. Neck supple. No JVD present. No tracheal deviation present. No thyromegaly present.   Cardiovascular: Normal rate, regular rhythm, normal heart sounds and intact distal pulses.  Exam  reveals no gallop and no friction rub.    No murmur heard.  Pulmonary/Chest: Effort normal and breath sounds normal. No stridor. No respiratory distress. He has no wheezes. He has no rales. He exhibits no tenderness.   Abdominal: Soft. Bowel sounds are normal. He exhibits no distension.   Musculoskeletal: Normal range of motion. He exhibits no edema or tenderness.   Lymphadenopathy:     He has no cervical adenopathy.   Neurological: He is alert and oriented to person, place, and time. He has normal reflexes. No cranial nerve deficit.   Skin: He is not diaphoretic.   Psychiatric: He has a normal mood and affect. His behavior is normal.   Nursing note and vitals reviewed.      Lab    @RESUFAST (WBC,HGB,HCT,PLT)@    Sodium   Date Value Ref Range Status   04/13/2017 139 136 - 145 mmol/L Final     Potassium   Date Value Ref Range Status   04/13/2017 4.9 3.5 - 5.1 mmol/L Final     Chloride   Date Value Ref Range Status   04/13/2017 104 95 - 110 mmol/L Final     CO2   Date Value Ref Range Status   04/13/2017 29 23 - 29 mmol/L Final     Glucose   Date Value Ref Range Status   04/13/2017 110 70 - 110 mg/dL Final     BUN, Bld   Date Value Ref Range Status   04/13/2017 15 6 - 20 mg/dL Final     Creatinine   Date Value Ref Range Status   04/13/2017 1.3 0.5 - 1.4 mg/dL Final     Calcium   Date Value Ref Range Status   04/13/2017 9.0 8.7 - 10.5 mg/dL Final     Total Protein   Date Value Ref Range Status   04/13/2017 7.0 6.0 - 8.4 g/dL Final     Albumin   Date Value Ref Range Status   04/13/2017 3.4 (L) 3.5 - 5.2 g/dL Final     Total Bilirubin   Date Value Ref Range Status   04/13/2017 0.5 0.1 - 1.0 mg/dL Final     Comment:     For infants and newborns, interpretation of results should be based  on gestational age, weight and in agreement with clinical  observations.  Premature Infant recommended reference ranges:  Up to 24 hours.............<8.0 mg/dL  Up to 48 hours............<12.0 mg/dL  3-5 days..................<15.0  mg/dL  6-29 days.................<15.0 mg/dL       Alkaline Phosphatase   Date Value Ref Range Status   04/13/2017 83 55 - 135 U/L Final     AST   Date Value Ref Range Status   04/13/2017 14 10 - 40 U/L Final     ALT   Date Value Ref Range Status   04/13/2017 18 10 - 44 U/L Final     Anion Gap   Date Value Ref Range Status   04/13/2017 6 (L) 8 - 16 mmol/L Final         Xray        Impression/Plan    Problem List Items Addressed This Visit        Other    Dyspnea  - better, resolved    Abnormal CT of the chest - Primary  - will repeat CT scan  - follow up depending on that study      Other Visit Diagnoses    None.

## 2019-05-14 ENCOUNTER — HOSPITAL ENCOUNTER (INPATIENT)
Facility: HOSPITAL | Age: 59
LOS: 1 days | Discharge: HOME | DRG: 065 | End: 2019-05-16
Attending: EMERGENCY MEDICINE | Admitting: PSYCHIATRY & NEUROLOGY
Payer: MEDICARE

## 2019-05-14 DIAGNOSIS — I63.9 STROKE: ICD-10-CM

## 2019-05-14 DIAGNOSIS — R29.90 EPISODE OF TRANSIENT NEUROLOGIC SYMPTOMS: ICD-10-CM

## 2019-05-14 DIAGNOSIS — I63.311 THROMBOTIC STROKE INVOLVING RIGHT MIDDLE CEREBRAL ARTERY: ICD-10-CM

## 2019-05-14 PROBLEM — R47.1 DYSARTHRIA AND ANARTHRIA: Status: ACTIVE | Noted: 2019-05-14

## 2019-05-14 LAB
ALBUMIN SERPL BCP-MCNC: 3.1 G/DL (ref 3.5–5.2)
ALLENS TEST: ABNORMAL
ALP SERPL-CCNC: 83 U/L (ref 55–135)
ALT SERPL W/O P-5'-P-CCNC: 12 U/L (ref 10–44)
ANION GAP SERPL CALC-SCNC: 8 MMOL/L (ref 8–16)
AST SERPL-CCNC: 17 U/L (ref 10–40)
BASOPHILS # BLD AUTO: 0.1 K/UL (ref 0–0.2)
BASOPHILS NFR BLD: 1 % (ref 0–1.9)
BILIRUB SERPL-MCNC: 0.3 MG/DL (ref 0.1–1)
BUN SERPL-MCNC: 24 MG/DL (ref 6–20)
CALCIUM SERPL-MCNC: 9.2 MG/DL (ref 8.7–10.5)
CHLORIDE SERPL-SCNC: 106 MMOL/L (ref 95–110)
CHOLEST SERPL-MCNC: 134 MG/DL (ref 120–199)
CHOLEST/HDLC SERPL: 4.6 {RATIO} (ref 2–5)
CO2 SERPL-SCNC: 23 MMOL/L (ref 23–29)
CREAT SERPL-MCNC: 1.1 MG/DL (ref 0.5–1.4)
CREAT SERPL-MCNC: 1.1 MG/DL (ref 0.5–1.4)
DELSYS: ABNORMAL
DIFFERENTIAL METHOD: ABNORMAL
EOSINOPHIL # BLD AUTO: 0.4 K/UL (ref 0–0.5)
EOSINOPHIL NFR BLD: 4.1 % (ref 0–8)
ERYTHROCYTE [DISTWIDTH] IN BLOOD BY AUTOMATED COUNT: 14.7 % (ref 11.5–14.5)
EST. GFR  (AFRICAN AMERICAN): >60 ML/MIN/1.73 M^2
EST. GFR  (NON AFRICAN AMERICAN): >60 ML/MIN/1.73 M^2
FIO2: 21
GLUCOSE SERPL-MCNC: 108 MG/DL (ref 70–110)
GLUCOSE SERPL-MCNC: 120 MG/DL (ref 70–110)
HCO3 UR-SCNC: 28.2 MMOL/L (ref 24–28)
HCT VFR BLD AUTO: 46.8 % (ref 40–54)
HDLC SERPL-MCNC: 29 MG/DL (ref 40–75)
HDLC SERPL: 21.6 % (ref 20–50)
HGB BLD-MCNC: 14.8 G/DL (ref 14–18)
IMM GRANULOCYTES # BLD AUTO: 0.03 K/UL (ref 0–0.04)
IMM GRANULOCYTES NFR BLD AUTO: 0.3 % (ref 0–0.5)
INR PPP: 1.1 (ref 0.8–1.2)
LDLC SERPL CALC-MCNC: 83.6 MG/DL (ref 63–159)
LYMPHOCYTES # BLD AUTO: 1.2 K/UL (ref 1–4.8)
LYMPHOCYTES NFR BLD: 12.7 % (ref 18–48)
MCH RBC QN AUTO: 28.4 PG (ref 27–31)
MCHC RBC AUTO-ENTMCNC: 31.6 G/DL (ref 32–36)
MCV RBC AUTO: 90 FL (ref 82–98)
MODE: ABNORMAL
MONOCYTES # BLD AUTO: 0.8 K/UL (ref 0.3–1)
MONOCYTES NFR BLD: 8.3 % (ref 4–15)
NEUTROPHILS # BLD AUTO: 7.2 K/UL (ref 1.8–7.7)
NEUTROPHILS NFR BLD: 73.6 % (ref 38–73)
NONHDLC SERPL-MCNC: 105 MG/DL
NRBC BLD-RTO: 0 /100 WBC
PCO2 BLDA: 48.6 MMHG (ref 35–45)
PH SMN: 7.37 [PH] (ref 7.35–7.45)
PLATELET # BLD AUTO: 218 K/UL (ref 150–350)
PMV BLD AUTO: 10 FL (ref 9.2–12.9)
PO2 BLDA: 39 MMHG (ref 40–60)
POC BE: 3 MMOL/L
POC PTINR: 1.1 (ref 0.9–1.2)
POC PTWBT: 13.1 SEC (ref 9.7–14.3)
POC SATURATED O2: 71 % (ref 95–100)
POC TCO2: 30 MMOL/L (ref 24–29)
POCT GLUCOSE: 120 MG/DL (ref 70–110)
POTASSIUM SERPL-SCNC: 4.3 MMOL/L (ref 3.5–5.1)
PROT SERPL-MCNC: 7 G/DL (ref 6–8.4)
PROTHROMBIN TIME: 10.9 SEC (ref 9–12.5)
RBC # BLD AUTO: 5.21 M/UL (ref 4.6–6.2)
SAMPLE: ABNORMAL
SAMPLE: NORMAL
SAMPLE: NORMAL
SITE: ABNORMAL
SODIUM SERPL-SCNC: 137 MMOL/L (ref 136–145)
TRIGL SERPL-MCNC: 107 MG/DL (ref 30–150)
TSH SERPL DL<=0.005 MIU/L-ACNC: 1.29 UIU/ML (ref 0.4–4)
WBC # BLD AUTO: 9.73 K/UL (ref 3.9–12.7)

## 2019-05-14 PROCEDURE — 82565 ASSAY OF CREATININE: CPT

## 2019-05-14 PROCEDURE — 93010 EKG 12-LEAD: ICD-10-PCS | Mod: ,,, | Performed by: INTERNAL MEDICINE

## 2019-05-14 PROCEDURE — 99291 CRITICAL CARE FIRST HOUR: CPT | Mod: ,,, | Performed by: EMERGENCY MEDICINE

## 2019-05-14 PROCEDURE — 25000003 PHARM REV CODE 250: Performed by: EMERGENCY MEDICINE

## 2019-05-14 PROCEDURE — 82962 GLUCOSE BLOOD TEST: CPT

## 2019-05-14 PROCEDURE — 99220 PR INITIAL OBSERVATION CARE,LEVL III: ICD-10-PCS | Mod: ,,, | Performed by: PSYCHIATRY & NEUROLOGY

## 2019-05-14 PROCEDURE — 80061 LIPID PANEL: CPT

## 2019-05-14 PROCEDURE — 25000003 PHARM REV CODE 250: Performed by: NURSE PRACTITIONER

## 2019-05-14 PROCEDURE — 99900035 HC TECH TIME PER 15 MIN (STAT)

## 2019-05-14 PROCEDURE — 85610 PROTHROMBIN TIME: CPT

## 2019-05-14 PROCEDURE — G0378 HOSPITAL OBSERVATION PER HR: HCPCS

## 2019-05-14 PROCEDURE — 84100 ASSAY OF PHOSPHORUS: CPT

## 2019-05-14 PROCEDURE — 93010 ELECTROCARDIOGRAM REPORT: CPT | Mod: ,,, | Performed by: INTERNAL MEDICINE

## 2019-05-14 PROCEDURE — 99220 PR INITIAL OBSERVATION CARE,LEVL III: CPT | Mod: ,,, | Performed by: PSYCHIATRY & NEUROLOGY

## 2019-05-14 PROCEDURE — 83735 ASSAY OF MAGNESIUM: CPT

## 2019-05-14 PROCEDURE — 82803 BLOOD GASES ANY COMBINATION: CPT

## 2019-05-14 PROCEDURE — 84443 ASSAY THYROID STIM HORMONE: CPT

## 2019-05-14 PROCEDURE — 96372 THER/PROPH/DIAG INJ SC/IM: CPT | Mod: 59

## 2019-05-14 PROCEDURE — 96365 THER/PROPH/DIAG IV INF INIT: CPT

## 2019-05-14 PROCEDURE — 93005 ELECTROCARDIOGRAM TRACING: CPT

## 2019-05-14 PROCEDURE — 99291 PR CRITICAL CARE, E/M 30-74 MINUTES: ICD-10-PCS | Mod: ,,, | Performed by: EMERGENCY MEDICINE

## 2019-05-14 PROCEDURE — 85025 COMPLETE CBC W/AUTO DIFF WBC: CPT

## 2019-05-14 PROCEDURE — 25500020 PHARM REV CODE 255: Performed by: EMERGENCY MEDICINE

## 2019-05-14 PROCEDURE — 99291 CRITICAL CARE FIRST HOUR: CPT | Mod: 25

## 2019-05-14 PROCEDURE — 80053 COMPREHEN METABOLIC PANEL: CPT

## 2019-05-14 RX ORDER — FERROUS SULFATE 325(65) MG
325 TABLET, DELAYED RELEASE (ENTERIC COATED) ORAL DAILY
Status: DISCONTINUED | OUTPATIENT
Start: 2019-05-15 | End: 2019-05-16 | Stop reason: HOSPADM

## 2019-05-14 RX ORDER — PANTOPRAZOLE SODIUM 40 MG/1
40 TABLET, DELAYED RELEASE ORAL DAILY
Status: DISCONTINUED | OUTPATIENT
Start: 2019-05-15 | End: 2019-05-16 | Stop reason: HOSPADM

## 2019-05-14 RX ORDER — DOCUSATE SODIUM 100 MG/1
100 CAPSULE, LIQUID FILLED ORAL 2 TIMES DAILY
Status: DISCONTINUED | OUTPATIENT
Start: 2019-05-15 | End: 2019-05-16 | Stop reason: HOSPADM

## 2019-05-14 RX ORDER — IBUPROFEN 200 MG
1 TABLET ORAL DAILY
Status: DISCONTINUED | OUTPATIENT
Start: 2019-05-15 | End: 2019-05-16 | Stop reason: HOSPADM

## 2019-05-14 RX ORDER — HYDROCHLOROTHIAZIDE 25 MG/1
25 TABLET ORAL
Status: COMPLETED | OUTPATIENT
Start: 2019-05-14 | End: 2019-05-14

## 2019-05-14 RX ORDER — METOPROLOL TARTRATE 50 MG/1
50 TABLET ORAL
Status: DISCONTINUED | OUTPATIENT
Start: 2019-05-14 | End: 2019-05-14

## 2019-05-14 RX ORDER — ATORVASTATIN CALCIUM 20 MG/1
80 TABLET, FILM COATED ORAL DAILY
Status: DISCONTINUED | OUTPATIENT
Start: 2019-05-15 | End: 2019-05-16 | Stop reason: HOSPADM

## 2019-05-14 RX ORDER — AMLODIPINE BESYLATE 10 MG/1
10 TABLET ORAL
Status: COMPLETED | OUTPATIENT
Start: 2019-05-14 | End: 2019-05-14

## 2019-05-14 RX ORDER — LABETALOL HCL 20 MG/4 ML
10 SYRINGE (ML) INTRAVENOUS EVERY 6 HOURS PRN
Status: DISCONTINUED | OUTPATIENT
Start: 2019-05-15 | End: 2019-05-16 | Stop reason: HOSPADM

## 2019-05-14 RX ORDER — ASPIRIN 81 MG/1
81 TABLET ORAL DAILY
Status: DISCONTINUED | OUTPATIENT
Start: 2019-05-15 | End: 2019-05-15

## 2019-05-14 RX ORDER — ONDANSETRON 8 MG/1
8 TABLET, ORALLY DISINTEGRATING ORAL EVERY 8 HOURS PRN
Status: DISCONTINUED | OUTPATIENT
Start: 2019-05-15 | End: 2019-05-16 | Stop reason: HOSPADM

## 2019-05-14 RX ORDER — ONDANSETRON 2 MG/ML
4 INJECTION INTRAMUSCULAR; INTRAVENOUS EVERY 12 HOURS PRN
Status: DISCONTINUED | OUTPATIENT
Start: 2019-05-15 | End: 2019-05-16 | Stop reason: HOSPADM

## 2019-05-14 RX ORDER — SODIUM CHLORIDE 0.9 % (FLUSH) 0.9 %
10 SYRINGE (ML) INJECTION
Status: DISCONTINUED | OUTPATIENT
Start: 2019-05-15 | End: 2019-05-16 | Stop reason: HOSPADM

## 2019-05-14 RX ORDER — CLONIDINE HYDROCHLORIDE 0.1 MG/1
0.1 TABLET ORAL
Status: COMPLETED | OUTPATIENT
Start: 2019-05-14 | End: 2019-05-14

## 2019-05-14 RX ORDER — NICARDIPINE HYDROCHLORIDE 0.2 MG/ML
2.5 INJECTION INTRAVENOUS CONTINUOUS
Status: DISCONTINUED | OUTPATIENT
Start: 2019-05-14 | End: 2019-05-14

## 2019-05-14 RX ADMIN — CLONIDINE HYDROCHLORIDE 0.1 MG: 0.1 TABLET ORAL at 11:05

## 2019-05-14 RX ADMIN — IOHEXOL 100 ML: 350 INJECTION, SOLUTION INTRAVENOUS at 10:05

## 2019-05-14 RX ADMIN — NICARDIPINE HYDROCHLORIDE 5 MG/HR: 0.2 INJECTION, SOLUTION INTRAVENOUS at 10:05

## 2019-05-14 RX ADMIN — AMLODIPINE BESYLATE 10 MG: 10 TABLET ORAL at 11:05

## 2019-05-14 RX ADMIN — HYDROCHLOROTHIAZIDE 25 MG: 25 TABLET ORAL at 11:05

## 2019-05-15 PROBLEM — I63.311 THROMBOTIC STROKE INVOLVING RIGHT MIDDLE CEREBRAL ARTERY: Status: ACTIVE | Noted: 2019-05-14

## 2019-05-15 PROBLEM — R29.90 EPISODE OF TRANSIENT NEUROLOGIC SYMPTOMS: Status: RESOLVED | Noted: 2019-05-14 | Resolved: 2019-05-15

## 2019-05-15 LAB
ALBUMIN SERPL BCP-MCNC: 3.3 G/DL (ref 3.5–5.2)
ALP SERPL-CCNC: 84 U/L (ref 55–135)
ALT SERPL W/O P-5'-P-CCNC: 15 U/L (ref 10–44)
ANION GAP SERPL CALC-SCNC: 9 MMOL/L (ref 8–16)
APTT BLDCRRT: 27.3 SEC (ref 21–32)
ASCENDING AORTA: 3.59 CM
AST SERPL-CCNC: 14 U/L (ref 10–40)
AV INDEX (PROSTH): 0.72
AV MEAN GRADIENT: 3.13 MMHG
AV PEAK GRADIENT: 4.84 MMHG
AV VALVE AREA: 3.28 CM2
AV VELOCITY RATIO: 0.69
BASOPHILS # BLD AUTO: 0.08 K/UL (ref 0–0.2)
BASOPHILS NFR BLD: 0.8 % (ref 0–1.9)
BILIRUB SERPL-MCNC: 0.2 MG/DL (ref 0.1–1)
BILIRUB UR QL STRIP: NEGATIVE
BSA FOR ECHO PROCEDURE: 2.36 M2
BUN SERPL-MCNC: 20 MG/DL (ref 6–20)
CALCIUM SERPL-MCNC: 9.1 MG/DL (ref 8.7–10.5)
CHLORIDE SERPL-SCNC: 104 MMOL/L (ref 95–110)
CK MB SERPL-MCNC: 0.7 NG/ML (ref 0.1–6.5)
CK MB SERPL-RTO: 1.5 % (ref 0–5)
CK SERPL-CCNC: 47 U/L (ref 20–200)
CLARITY UR REFRACT.AUTO: CLEAR
CO2 SERPL-SCNC: 24 MMOL/L (ref 23–29)
COLOR UR AUTO: YELLOW
CREAT SERPL-MCNC: 0.9 MG/DL (ref 0.5–1.4)
CV ECHO LV RWT: 0.38 CM
DIFFERENTIAL METHOD: ABNORMAL
DOP CALC AO PEAK VEL: 1.1 M/S
DOP CALC AO VTI: 26.51 CM
DOP CALC LVOT AREA: 4.56 CM2
DOP CALC LVOT DIAMETER: 2.41 CM
DOP CALC LVOT PEAK VEL: 0.76 M/S
DOP CALC LVOT STROKE VOLUME: 86.86 CM3
DOP CALCLVOT PEAK VEL VTI: 19.05 CM
E WAVE DECELERATION TIME: 330.72 MSEC
E/A RATIO: 1
E/E' RATIO: 10
ECHO LV POSTERIOR WALL: 1.2 CM (ref 0.6–1.1)
EOSINOPHIL # BLD AUTO: 0.4 K/UL (ref 0–0.5)
EOSINOPHIL NFR BLD: 4.5 % (ref 0–8)
ERYTHROCYTE [DISTWIDTH] IN BLOOD BY AUTOMATED COUNT: 14.8 % (ref 11.5–14.5)
EST. GFR  (AFRICAN AMERICAN): >60 ML/MIN/1.73 M^2
EST. GFR  (NON AFRICAN AMERICAN): >60 ML/MIN/1.73 M^2
ESTIMATED AVG GLUCOSE: 111 MG/DL (ref 68–131)
FRACTIONAL SHORTENING: 37 % (ref 28–44)
GLUCOSE SERPL-MCNC: 106 MG/DL (ref 70–110)
GLUCOSE UR QL STRIP: NEGATIVE
HBA1C MFR BLD HPLC: 5.5 % (ref 4–5.6)
HCT VFR BLD AUTO: 46.3 % (ref 40–54)
HGB BLD-MCNC: 14.7 G/DL (ref 14–18)
HGB UR QL STRIP: NEGATIVE
IMM GRANULOCYTES # BLD AUTO: 0.03 K/UL (ref 0–0.04)
IMM GRANULOCYTES NFR BLD AUTO: 0.3 % (ref 0–0.5)
INR PPP: 1 (ref 0.8–1.2)
INTERVENTRICULAR SEPTUM: 1.2 CM (ref 0.6–1.1)
KETONES UR QL STRIP: NEGATIVE
LA MAJOR: 5.84 CM
LA MINOR: 4.73 CM
LA WIDTH: 4.11 CM
LEFT ATRIUM SIZE: 4.46 CM
LEFT ATRIUM VOLUME INDEX: 35.4 ML/M2
LEFT ATRIUM VOLUME: 81.44 CM3
LEFT INTERNAL DIMENSION IN SYSTOLE: 4 CM (ref 2.1–4)
LEFT VENTRICLE DIASTOLIC VOLUME INDEX: 88.24 ML/M2
LEFT VENTRICLE DIASTOLIC VOLUME: 202.86 ML
LEFT VENTRICLE MASS INDEX: 148.9 G/M2
LEFT VENTRICLE SYSTOLIC VOLUME INDEX: 27.7 ML/M2
LEFT VENTRICLE SYSTOLIC VOLUME: 63.69 ML
LEFT VENTRICULAR INTERNAL DIMENSION IN DIASTOLE: 6.32 CM (ref 3.5–6)
LEFT VENTRICULAR MASS: 342.23 G
LEUKOCYTE ESTERASE UR QL STRIP: NEGATIVE
LV LATERAL E/E' RATIO: 8.75
LV SEPTAL E/E' RATIO: 11.67
LYMPHOCYTES # BLD AUTO: 1.5 K/UL (ref 1–4.8)
LYMPHOCYTES NFR BLD: 15.6 % (ref 18–48)
MAGNESIUM SERPL-MCNC: 2.1 MG/DL (ref 1.6–2.6)
MCH RBC QN AUTO: 28.5 PG (ref 27–31)
MCHC RBC AUTO-ENTMCNC: 31.7 G/DL (ref 32–36)
MCV RBC AUTO: 90 FL (ref 82–98)
MONOCYTES # BLD AUTO: 0.6 K/UL (ref 0.3–1)
MONOCYTES NFR BLD: 6.2 % (ref 4–15)
MV PEAK A VEL: 0.7 M/S
MV PEAK E VEL: 0.7 M/S
NEUTROPHILS # BLD AUTO: 7 K/UL (ref 1.8–7.7)
NEUTROPHILS NFR BLD: 72.6 % (ref 38–73)
NITRITE UR QL STRIP: NEGATIVE
NRBC BLD-RTO: 0 /100 WBC
PH UR STRIP: 6 [PH] (ref 5–8)
PHOSPHATE SERPL-MCNC: 4.1 MG/DL (ref 2.7–4.5)
PLATELET # BLD AUTO: 222 K/UL (ref 150–350)
PMV BLD AUTO: 10 FL (ref 9.2–12.9)
POTASSIUM SERPL-SCNC: 4.3 MMOL/L (ref 3.5–5.1)
PROT SERPL-MCNC: 7.1 G/DL (ref 6–8.4)
PROT UR QL STRIP: NEGATIVE
PROTHROMBIN TIME: 10.3 SEC (ref 9–12.5)
RA MAJOR: 5.53 CM
RA WIDTH: 3.21 CM
RBC # BLD AUTO: 5.15 M/UL (ref 4.6–6.2)
RETIRED EF AND QEF - SEE NOTES: 51 %
RIGHT VENTRICULAR END-DIASTOLIC DIMENSION: 3.6 CM
SINUS: 3.24 CM
SODIUM SERPL-SCNC: 137 MMOL/L (ref 136–145)
SP GR UR STRIP: >=1.03 (ref 1–1.03)
STJ: 3.21 CM
TDI LATERAL: 0.08
TDI SEPTAL: 0.06
TDI: 0.07
TRICUSPID ANNULAR PLANE SYSTOLIC EXCURSION: 1.91 CM
TROPONIN I SERPL DL<=0.01 NG/ML-MCNC: <0.006 NG/ML (ref 0–0.03)
URN SPEC COLLECT METH UR: ABNORMAL
WBC # BLD AUTO: 9.68 K/UL (ref 3.9–12.7)

## 2019-05-15 PROCEDURE — 97165 OT EVAL LOW COMPLEX 30 MIN: CPT

## 2019-05-15 PROCEDURE — 82550 ASSAY OF CK (CPK): CPT

## 2019-05-15 PROCEDURE — 85025 COMPLETE CBC W/AUTO DIFF WBC: CPT

## 2019-05-15 PROCEDURE — 82553 CREATINE MB FRACTION: CPT

## 2019-05-15 PROCEDURE — 80053 COMPREHEN METABOLIC PANEL: CPT

## 2019-05-15 PROCEDURE — 92523 SPEECH SOUND LANG COMPREHEN: CPT

## 2019-05-15 PROCEDURE — 99233 PR SUBSEQUENT HOSPITAL CARE,LEVL III: ICD-10-PCS | Mod: ,,, | Performed by: PSYCHIATRY & NEUROLOGY

## 2019-05-15 PROCEDURE — 63600175 PHARM REV CODE 636 W HCPCS: Performed by: FAMILY MEDICINE

## 2019-05-15 PROCEDURE — 83036 HEMOGLOBIN GLYCOSYLATED A1C: CPT

## 2019-05-15 PROCEDURE — 92610 EVALUATE SWALLOWING FUNCTION: CPT

## 2019-05-15 PROCEDURE — 85610 PROTHROMBIN TIME: CPT

## 2019-05-15 PROCEDURE — 20600001 HC STEP DOWN PRIVATE ROOM

## 2019-05-15 PROCEDURE — 81003 URINALYSIS AUTO W/O SCOPE: CPT

## 2019-05-15 PROCEDURE — 84484 ASSAY OF TROPONIN QUANT: CPT

## 2019-05-15 PROCEDURE — 85730 THROMBOPLASTIN TIME PARTIAL: CPT

## 2019-05-15 PROCEDURE — 25000003 PHARM REV CODE 250: Performed by: NURSE PRACTITIONER

## 2019-05-15 PROCEDURE — 97530 THERAPEUTIC ACTIVITIES: CPT

## 2019-05-15 PROCEDURE — 99233 SBSQ HOSP IP/OBS HIGH 50: CPT | Mod: ,,, | Performed by: PSYCHIATRY & NEUROLOGY

## 2019-05-15 PROCEDURE — 25000003 PHARM REV CODE 250: Performed by: FAMILY MEDICINE

## 2019-05-15 PROCEDURE — A4216 STERILE WATER/SALINE, 10 ML: HCPCS | Performed by: NURSE PRACTITIONER

## 2019-05-15 RX ORDER — CLOPIDOGREL BISULFATE 75 MG/1
75 TABLET ORAL DAILY
Status: DISCONTINUED | OUTPATIENT
Start: 2019-05-16 | End: 2019-05-15

## 2019-05-15 RX ORDER — ASPIRIN 325 MG
325 TABLET ORAL DAILY
Status: DISCONTINUED | OUTPATIENT
Start: 2019-05-15 | End: 2019-05-15

## 2019-05-15 RX ORDER — CLOPIDOGREL 300 MG/1
300 TABLET, FILM COATED ORAL ONCE
Status: DISCONTINUED | OUTPATIENT
Start: 2019-05-15 | End: 2019-05-15

## 2019-05-15 RX ORDER — HEPARIN SODIUM 5000 [USP'U]/ML
5000 INJECTION, SOLUTION INTRAVENOUS; SUBCUTANEOUS EVERY 8 HOURS
Status: DISCONTINUED | OUTPATIENT
Start: 2019-05-15 | End: 2019-05-16 | Stop reason: HOSPADM

## 2019-05-15 RX ORDER — CLOPIDOGREL BISULFATE 75 MG/1
75 TABLET ORAL DAILY
Status: DISCONTINUED | OUTPATIENT
Start: 2019-05-15 | End: 2019-05-16 | Stop reason: HOSPADM

## 2019-05-15 RX ORDER — ASPIRIN 325 MG
325 TABLET ORAL DAILY
Status: DISCONTINUED | OUTPATIENT
Start: 2019-05-15 | End: 2019-05-16 | Stop reason: HOSPADM

## 2019-05-15 RX ADMIN — Medication 10 ML: at 09:05

## 2019-05-15 RX ADMIN — ASPIRIN 81 MG: 81 TABLET, COATED ORAL at 09:05

## 2019-05-15 RX ADMIN — DOCUSATE SODIUM 100 MG: 100 CAPSULE, LIQUID FILLED ORAL at 09:05

## 2019-05-15 RX ADMIN — ATORVASTATIN CALCIUM 80 MG: 20 TABLET, FILM COATED ORAL at 09:05

## 2019-05-15 RX ADMIN — FERROUS SULFATE TAB EC 325 MG (65 MG FE EQUIVALENT) 325 MG: 325 (65 FE) TABLET DELAYED RESPONSE at 08:05

## 2019-05-15 RX ADMIN — HEPARIN SODIUM 5000 UNITS: 5000 INJECTION, SOLUTION INTRAVENOUS; SUBCUTANEOUS at 09:05

## 2019-05-15 RX ADMIN — CLOPIDOGREL 75 MG: 75 TABLET, FILM COATED ORAL at 02:05

## 2019-05-15 RX ADMIN — HEPARIN SODIUM 5000 UNITS: 5000 INJECTION, SOLUTION INTRAVENOUS; SUBCUTANEOUS at 03:05

## 2019-05-15 RX ADMIN — PANTOPRAZOLE SODIUM 40 MG: 40 TABLET, DELAYED RELEASE ORAL at 08:05

## 2019-05-15 NOTE — SUBJECTIVE & OBJECTIVE
Past Medical History:   Diagnosis Date    Abnormal PFT     Cardiac murmur     Cardiomegaly     CKD (chronic kidney disease)     Coronary artery disease     Depression     DVT (deep venous thrombosis)     left lower leg    Dyspnea     Edema     GI bleed     High cholesterol     Hypertension     PVD (peripheral vascular disease)      Past Surgical History:   Procedure Laterality Date    VASCULAR SURGERY      left lower leg     Family History   Problem Relation Age of Onset    Diabetes Mother      Social History     Tobacco Use    Smoking status: Current Every Day Smoker     Packs/day: 1.00     Years: 48.00     Pack years: 48.00     Types: Cigarettes     Last attempt to quit: 2017     Years since quittin.3    Smokeless tobacco: Never Used   Substance Use Topics    Alcohol use: No     Comment: Hx of drinking alot when younger    Drug use: No     Review of patient's allergies indicates:   Allergen Reactions    Morphine Itching    Plavix [clopidogrel]      bleeding    Zyban [bupropion hcl (smoking deter)] Swelling       Medications: I have reviewed the current medication administration record.    No current facility-administered medications on file prior to encounter.      Current Outpatient Medications on File Prior to Encounter   Medication Sig Dispense Refill    amLODIPine (NORVASC) 10 MG tablet       aspirin (ECOTRIN) 81 MG EC tablet Take 81 mg by mouth once daily.      atorvastatin (LIPITOR) 80 MG tablet Take 80 mg by mouth once daily.  2    cloNIDine (CATAPRES) 0.1 MG tablet Take 0.1 mg by mouth 2 (two) times daily.      fosinopril-hydrochlorothiazide (MONOPRIL-HCT) 20-12.5 mg per tablet Take 1 tablet by mouth once daily.      diazePAM (VALIUM) 10 MG Tab Take 10 mg by mouth.      docusate sodium (COLACE) 100 MG capsule Take 100 mg by mouth 2 (two) times daily.      ferrous sulfate 325 mg (65 mg iron) Tab tablet Take 325 mg by mouth daily with breakfast.      fluoxetine  (PROZAC) 20 MG capsule Take 20 mg by mouth once daily.      fluticasone (FLONASE) 50 mcg/actuation nasal spray 1 spray by Each Nare route once daily.      lidocaine HCL 2% (XYLOCAINE) 2 % jelly Apply topically as needed. 30 mL 0    metoprolol tartrate (LOPRESSOR) 50 MG tablet Take 50 mg by mouth 2 (two) times daily.      pantoprazole (PROTONIX) 40 MG tablet Take 40 mg by mouth once daily.      pramoxine 1 % Foam Place rectally 3 (three) times daily as needed.  0    rosuvastatin (CRESTOR) 20 MG tablet Take 40 mg by mouth once daily.           Review of Systems   Constitutional: Negative for chills, fatigue and fever.   HENT: Negative for drooling, facial swelling and trouble swallowing.    Eyes: Negative for photophobia, pain and visual disturbance.   Respiratory: Positive for shortness of breath. Negative for cough and wheezing.    Cardiovascular: Negative for chest pain, palpitations and leg swelling.   Gastrointestinal: Negative for abdominal pain, constipation, diarrhea, nausea and vomiting.   Endocrine: Negative for cold intolerance and heat intolerance.   Genitourinary: Negative for dysuria and hematuria.   Musculoskeletal: Negative for neck pain and neck stiffness.   Skin: Negative for rash.   Allergic/Immunologic: Negative for environmental allergies and food allergies.   Neurological: Positive for facial asymmetry, speech difficulty, weakness and light-headedness. Negative for dizziness, tremors, numbness and headaches.   Hematological: Negative for adenopathy. Does not bruise/bleed easily.   Psychiatric/Behavioral: Negative for agitation, confusion and hallucinations.     Objective:     Vital Signs (Most Recent):  Pulse: (!) 58 (05/14/19 2308)  BP: (!) 148/59 (05/14/19 2308)  SpO2: (!) 94 % (05/14/19 2308)    Vital Signs Range (Last 24H):  Pulse:  [55-61]   BP: (148-197)/(58-86)   SpO2:  [94 %-96 %]     Physical Exam   Constitutional: He is oriented to person, place, and time. He appears  well-developed and well-nourished. No distress.   HENT:   Head: Normocephalic and atraumatic.   Right Ear: External ear normal.   Left Ear: External ear normal.   Nose: Nose normal.   Mouth/Throat: Oropharynx is clear and moist. No oropharyngeal exudate.   Eyes: Pupils are equal, round, and reactive to light. Conjunctivae and EOM are normal. Right eye exhibits no discharge. Left eye exhibits no discharge. No scleral icterus.   Neck: Normal range of motion. Neck supple. No thyromegaly present.   Cardiovascular: Normal rate, regular rhythm and intact distal pulses.   Murmur heard.  Pulmonary/Chest: Effort normal and breath sounds normal. No respiratory distress. He has no wheezes.   Abdominal: Soft. Bowel sounds are normal. He exhibits no distension. There is no tenderness.   Musculoskeletal: He exhibits no edema.   Lymphadenopathy:     He has no cervical adenopathy.   Neurological: He is alert and oriented to person, place, and time.   Skin: Skin is warm and dry. He is not diaphoretic.   Psychiatric: He has a normal mood and affect.   Nursing note and vitals reviewed.      Neurological Exam:   LOC: alert  Attention Span: Good   Language: No aphasia  Articulation: Dysarthria  Orientation: Person, Place, Time   Visual Fields: Full  EOM (CN III, IV, VI): Full/intact  Pupils (CN II, III): PERRL  Facial Movement (CN VII): Lower facial weakness on the Left  Cebellar: Upper Extremity Appendicular Ataxia (Finger Nose Finger)  Left  Sensation: Intact to light touch, temperature and vibration      Laboratory:  CMP:   Recent Labs   Lab 05/14/19 2219   CALCIUM 9.2   ALBUMIN 3.1*   PROT 7.0      K 4.3   CO2 23      BUN 24*   CREATININE 1.1   ALKPHOS 83   ALT 12   AST 17   BILITOT 0.3     CBC:   Recent Labs   Lab 05/14/19 2219   WBC 9.73   RBC 5.21   HGB 14.8   HCT 46.8      MCV 90   MCH 28.4   MCHC 31.6*     Lipid Panel:   Recent Labs   Lab 05/14/19 2219   CHOL 134   LDLCALC 83.6   HDL 29*   TRIG 107      Coagulation:   Recent Labs   Lab 05/14/19 2219   INR 1.1     Hgb A1C: No results for input(s): HGBA1C in the last 168 hours.  TSH:   Recent Labs   Lab 05/14/19 2219   TSH 1.292       Diagnostic Results:      Brain imaging:  [facility] 5/14/19   1.  No CT evidence of acute intracranial abnormality.  If there is concern for acute ischemia, further evaluation could be performed with MRI.  2.  Patchy periventricular and supratentorial white matter hypoattenuation, nonspecific, although suggestive sequelae of chronic microvascular ischemic change.    MRI Brain pending    Vessel Imaging:  CTA Stroke MP 5/14/19 No CT findings of early ischemic changes.  MRI may be obtained for further evaluation.  Mild multifocal narrowing involving the remainder of the intracranial vessels without evidence of hemodynamically significant stenosis or large vessel occlusion.  Diffuse string like narrowing of the A1 segment of the left vertebral artery.  The chronicity of this is difficult to determine.  No hemodynamically significant stenosis of the neck vessels.  Incidental note made of dehiscent right jugular bulb.  Paraseptal emphysematous changes of bilateral lung apices.     Cardiac Evaluation:   2D Echo pending

## 2019-05-15 NOTE — SUBJECTIVE & OBJECTIVE
Neurologic Chief Complaint: left side weakness and dysarthria     Subjective:     Interval History: Patient is seen for follow-up neurological assessment and treatment recommendations: Admitted overnight for acute LSW and dysarthria. SBP 200s in ED. CT head negative. No TPA given due to great improvement in symptoms. MRI R thalamic acute infarct. Echo complete. DAPT started (no load; previous hx of rectal bleeding while on plavix) and statin. OT reccommend outpatient rehab. PT eval pending.     HPI, Past Medical, Family, and Social History remains the same as documented in the initial encounter.     Review of Systems   Constitutional: Negative for chills and fever.   Eyes: Negative for visual disturbance.   Respiratory: Negative for shortness of breath.    Cardiovascular: Negative for chest pain.   Gastrointestinal: Negative for nausea and vomiting.   Neurological: Positive for facial asymmetry, speech difficulty and weakness. Negative for numbness and headaches.   Psychiatric/Behavioral: Negative for confusion.     Scheduled Meds:   aspirin  325 mg Oral Daily    atorvastatin  80 mg Oral Daily    clopidogrel  75 mg Oral Daily    docusate sodium  100 mg Oral BID    ferrous sulfate  325 mg Oral Daily    heparin (porcine)  5,000 Units Subcutaneous Q8H    nicotine  1 patch Transdermal Daily    pantoprazole  40 mg Oral Daily     Continuous Infusions:   sodium chloride 0.9%       PRN Meds:labetalol, ondansetron, ondansetron, sodium chloride 0.9%, sodium chloride 0.9%    Objective:     Vital Signs (Most Recent):  Temp: 96.5 °F (35.8 °C) (05/15/19 1653)  Pulse: 62 (05/15/19 1653)  Resp: 18 (05/15/19 1653)  BP: (!) 185/85 (05/15/19 1653)  SpO2: (!) 94 % (05/15/19 1653)  BP Location: Left arm    Vital Signs Range (Last 24H):  Temp:  [96.5 °F (35.8 °C)-97.8 °F (36.6 °C)]   Pulse:  [50-72]   Resp:  [18]   BP: (130-199)/(57-93)   SpO2:  [92 %-98 %]   BP Location: Left arm    Physical Exam   Constitutional: He is  oriented to person, place, and time. He appears well-developed.   HENT:   Head: Normocephalic.   Poor dental health   Eyes: Pupils are equal, round, and reactive to light.   Neck: Normal range of motion.   Cardiovascular: Normal rate.   Pulmonary/Chest: Effort normal.   Neurological: He is alert and oriented to person, place, and time. He exhibits abnormal muscle tone.   See below   Skin: Skin is warm and dry.   Psychiatric: His speech is slurred.   Nursing note and vitals reviewed.      Neurological Exam:   LOC: alert  Attention Span: Good   Language: No aphasia  Articulation: Dysarthria  Orientation: Person, Place, Time   Visual Fields: Full  EOM (CN III, IV, VI): Full/intact  Facial Sensation (CN V): Normal  Facial Movement (CN VII): Lower facial weakness on the Left  Motor: Arm left  Paresis: 3/5  Leg left  Paresis: 4/5  Arm right  Normal 5/5  Leg right Normal 5/5  Sensation: Intact to light touch, temperature and vibration    Laboratory:  CMP:   Recent Labs   Lab 05/15/19  0440   CALCIUM 9.1   ALBUMIN 3.3*   PROT 7.1      K 4.3   CO2 24      BUN 20   CREATININE 0.9   ALKPHOS 84   ALT 15   AST 14   BILITOT 0.2     BMP:   Recent Labs   Lab 05/15/19  0440      K 4.3      CO2 24   BUN 20   CREATININE 0.9   CALCIUM 9.1     CBC:   Recent Labs   Lab 05/15/19  0440   WBC 9.68   RBC 5.15   HGB 14.7   HCT 46.3      MCV 90   MCH 28.5   MCHC 31.7*     Lipid Panel:   Recent Labs   Lab 05/14/19  2219   CHOL 134   LDLCALC 83.6   HDL 29*   TRIG 107     Hgb A1C:   Recent Labs   Lab 05/15/19  0101   HGBA1C 5.5     TSH:   Recent Labs   Lab 05/14/19  2219   TSH 1.292       Diagnostic Results      Brain imaging:  [facility] 5/14/19   1.  No CT evidence of acute intracranial abnormality.  If there is concern for acute ischemia, further evaluation could be performed with MRI.  2.  Patchy periventricular and supratentorial white matter hypoattenuation, nonspecific, although suggestive sequelae of chronic  microvascular ischemic change.     MRI Brain 5/15/19       Small focus of minimal restricted diffusion in the anterior aspect of the right thalamus, likely a recent lacunar infarct.    Bilateral remote lacunar infarcts.    Senescent and chronic microvascular ischemic changes.    Vessel Imaging:  CTA Stroke MP 5/14/19   No CT findings of early ischemic changes.  MRI may be obtained for further evaluation.  Mild multifocal narrowing involving the remainder of the intracranial vessels without evidence of hemodynamically significant stenosis or large vessel occlusion.  Diffuse string like narrowing of the A1 segment of the left vertebral artery.  The chronicity of this is difficult to determine.  No hemodynamically significant stenosis of the neck vessels.  Incidental note made of dehiscent right jugular bulb.  Paraseptal emphysematous changes of bilateral lung apices.      Cardiac Evaluation:   TTE 5/15/19  · Mild left atrial enlargement.  · Low normal left ventricular systolic function. The estimated ejection fraction is 50%  · Eccentric left ventricular hypertrophy.  · Moderate left ventricular enlargement.  · Indeterminate left ventricular diastolic function.  · Mild right atrial enlargement.  · Normal right ventricular systolic function.  · Mild mitral regurgitation.

## 2019-05-15 NOTE — ASSESSMENT & PLAN NOTE
58 y.o. male with significant past medical history of HTN, smoking presented to hospital complaining of acute onset left sided weakness and dysarthria that began ~40 min PTA in the ED.  Initial SBP 200s.  Cardene started.  CTH negative for acute findings.  SBP remained >180 on Cardene.  CTA negative for LVO. During CTA patient's SBP<180, symptoms greatly improved. Patient able to ambulate with steady gait and without assistance.  tPA not administered due to rapidly improving symptoms.      Etiology likely small vessel disease     Antithrombotics for secondary stroke prevention: Antiplatelets: Aspirin: 325 mg daily  Clopidogrel: 75 mg daily (no plavix load; patient has hx of GI bleed while previously on plavix)     Statins for secondary stroke prevention and hyperlipidemia, if present:   Statins: Atorvastatin- 80 mg daily    Aggressive risk factor modification: HTN, Smoking, HLD     Rehab efforts: The patient has been evaluated by a stroke team provider and the therapy needs have been fully considered based off the presenting complaints and exam findings. The following therapy evaluations are needed: PT evaluate and treat, OT evaluate and treat, SLP evaluate and treat     Diagnostics ordered/pending: none     VTE prophylaxis: Heparin 5000 units SQ every 8 hours, SCDs    BP parameters: Infarct: No intervention, SBP <220

## 2019-05-15 NOTE — ASSESSMENT & PLAN NOTE
58 y.o. male with significant past medical history of HTN, smoking presented to hospital complaining of acute onset left sided weakness and dysarthria that began ~40 min PTA in the ED.  Initial SBP 200s.  Cardene started.  CTH negative for acute findings.  SBP remained >180 on Cardene.  CTA negative for LVO.  During CTA patient's SBP<180, symptoms greatly improved. Patient able to ambulate with steady gait and without assistance.  tPA not administered due to rapidly improving symptoms.    Antithrombotics for secondary stroke prevention: Antiplatelets: Aspirin: 81 mg daily    Statins for secondary stroke prevention and hyperlipidemia, if present:   Statins: Atorvastatin- 80 mg daily    Aggressive risk factor modification: HTN, Smoking, HLD     Rehab efforts: The patient has been evaluated by a stroke team provider and the therapy needs have been fully considered based off the presenting complaints and exam findings. The following therapy evaluations are needed: PT evaluate and treat, OT evaluate and treat, SLP evaluate and treat    Diagnostics ordered/pending: MRI head without contrast to assess brain parenchyma, TTE to assess cardiac function/status     VTE prophylaxis: None: Reason for No Pharmacological VTE Prophylaxis: Ambulating with or without assistance, Mechanical prophylaxis: Place SCDs, GENEVIEVE hose    BP parameters: SBP<220 until no stroke confirmed

## 2019-05-15 NOTE — ED NOTES
AM meds given.  Pt status unchanged.  AAO x 4.  Family remains at bedside.  No new c/o at present or needs at this time..  Pt still waiting on admission bed.  Will continue to monitor.

## 2019-05-15 NOTE — HOSPITAL COURSE
Prolonged stay for the following reasons:     5/15: Admitted overnight for acute LSW and dysarthria. SBP 200s in ED. CT head negative. No TPA given due to great improvement in symptoms. MRI R thalamic acute infarct. Echo complete. DAPT started (no load; previous hx of rectal bleeding while on plavix) and statin. OT reccommend outpatient rehab. PT eval pending.   5/16: No significant events overnight. LUE weakness improving. Restart home clonidine. Pending PT eval may discharge today.

## 2019-05-15 NOTE — ED PROVIDER NOTES
Encounter Date: 2019    SCRIBE #1 NOTE: I, Lynn Mccoy, am scribing for, and in the presence of,  Dr. Velazquez. I have scribed the entire note.       History     Chief Complaint   Patient presents with    Extremity Weakness     Left sided facial droom and left extremity weakness since 9pm tonight.     Facial Droop     The patient is a 58 y.o. male with co-morbidities including: HTN, high cholesterol, DVT, CAD, CKD, and peripheral vascular disease, who presents to the ED with a complaint of left sided weakness and difficulty speaking. Patient was last known normal at 9:00 PM today. He reports daily aspirin use but denies the use of other blood thinners.     The history is provided by the patient and the EMS personnel. The history is limited by the condition of the patient.     Review of patient's allergies indicates:   Allergen Reactions    Morphine Itching    Plavix [clopidogrel]      bleeding    Zyban [bupropion hcl (smoking deter)] Swelling     Past Medical History:   Diagnosis Date    Abnormal PFT     Cardiac murmur     Cardiomegaly     CKD (chronic kidney disease)     Coronary artery disease     Depression     DVT (deep venous thrombosis)     left lower leg    Dyspnea     Edema     GI bleed     High cholesterol     Hypertension     PVD (peripheral vascular disease)      Past Surgical History:   Procedure Laterality Date    VASCULAR SURGERY      left lower leg     Family History   Problem Relation Age of Onset    Diabetes Mother      Social History     Tobacco Use    Smoking status: Current Every Day Smoker     Packs/day: 1.00     Years: 48.00     Pack years: 48.00     Types: Cigarettes     Last attempt to quit: 2017     Years since quittin.3    Smokeless tobacco: Never Used   Substance Use Topics    Alcohol use: No     Comment: Hx of drinking alot when younger    Drug use: No     Review of Systems   Unable to perform ROS: Acuity of condition       Physical Exam     Initial  Patient seen here last week Dx: UTI, no better and now vomiting Vitals [05/14/19 2223]   BP Pulse Resp Temp SpO2   (!) 195/86 (!) 56 -- -- 95 %      MAP       --         Physical Exam    Nursing note and vitals reviewed.  Constitutional: He appears well-developed and well-nourished. No distress.   HENT:   Head: Normocephalic and atraumatic.   Mouth/Throat: Oropharynx is clear and moist.   Eyes: Conjunctivae are normal.   Neck: Normal range of motion.   Cardiovascular: Normal rate, regular rhythm and normal heart sounds.   Patient is hypertensive.    Pulmonary/Chest: Breath sounds normal. No respiratory distress.   Abdominal: Soft. He exhibits no distension. There is no tenderness.   Musculoskeletal: Normal range of motion.   Neurological: He is alert and oriented to person, place, and time.   Left sided facial droop. Left upper and lower extremity weakness. Left pronator drift.    Skin: Skin is warm and dry.         ED Course   Procedures  Labs Reviewed   CBC W/ AUTO DIFFERENTIAL - Abnormal; Notable for the following components:       Result Value    Mean Corpuscular Hemoglobin Conc 31.6 (*)     RDW 14.7 (*)     Gran% 73.6 (*)     Lymph% 12.7 (*)     All other components within normal limits   COMPREHENSIVE METABOLIC PANEL - Abnormal; Notable for the following components:    BUN, Bld 24 (*)     Albumin 3.1 (*)     All other components within normal limits   LIPID PANEL - Abnormal; Notable for the following components:    HDL 29 (*)     All other components within normal limits   POCT GLUCOSE, HAND-HELD DEVICE - Abnormal; Notable for the following components:    POC Glucose 120 (*)     All other components within normal limits   ISTAT PROCEDURE - Abnormal; Notable for the following components:    POC PCO2 48.6 (*)     POC PO2 39 (*)     POC HCO3 28.2 (*)     POC SATURATED O2 71 (*)     POC TCO2 30 (*)     All other components within normal limits   POCT GLUCOSE - Abnormal; Notable for the following components:    POCT Glucose 120 (*)     All other components within normal limits  "  PROTIME-INR   TSH   MAGNESIUM   PHOSPHORUS   HEMOGLOBIN A1C   TROPONIN I   CK-MB   URINALYSIS, REFLEX TO URINE CULTURE   ISTAT PROCEDURE   ISTAT CREATININE     EKG Readings: (Independently Interpreted)   Rhythm: Sinus Bradycardia. Heart Rate: 56. ST Segments: Normal ST Segments. Axis: Normal.       Imaging Results          MRI BRAIN WITHOUT CONTRAST (In process)                X-Ray Chest AP Portable (Final result)  Result time 05/14/19 23:29:29    Final result by Ulises Almanzar MD (05/14/19 23:29:29)                 Impression:      Pulmonary emphysema.  No detrimental change when compared with 04/13/2017.      Electronically signed by: Ulises Almanzar MD  Date:    05/14/2019  Time:    23:29             Narrative:    EXAMINATION:  XR CHEST AP PORTABLE    CLINICAL HISTORY:  Provided history is "Stroke;  ".    TECHNIQUE:  One view of the chest.    COMPARISON:  04/13/2017.    FINDINGS:  Cardiac wires overlie the chest.  Cardiac silhouette is mildly prominent but stable.  Lungs are hyperinflated and demonstrate bilateral emphysematous changes.  No confluent area of consolidation.  No sizable pleural effusion.  No pneumothorax.                               CTA STROKE MULTI-PHASE (Final result)  Result time 05/14/19 23:51:14    Final result by Yimi Bales MD (05/14/19 23:51:14)                 Impression:      No CT findings of early ischemic changes.  MRI may be obtained for further evaluation.    Mild multifocal narrowing involving the remainder of the intracranial vessels without evidence of hemodynamically significant stenosis or large vessel occlusion.    Diffuse string like narrowing of the A1 segment of the left vertebral artery.  The chronicity of this is difficult to determine.    No hemodynamically significant stenosis of the neck vessels.    Incidental note made of dehiscent right jugular bulb.    Paraseptal emphysematous changes of bilateral lung apices.    Electronically signed by resident: Gentry" Love  Date:    05/14/2019  Time:    22:59    Electronically signed by: Yimi Bales MD  Date:    05/14/2019  Time:    23:51             Narrative:    EXAMINATION:  CTA STROKE MULTI-PHASE    CLINICAL HISTORY:  Left-sided facial droop, left upper extremity with weakness, and left pronator drift.    TECHNIQUE:  CT angiogram was performed from the level of the nicole to the top of the head following the IV administration of 100mL of Omnipaque 350.   Sagittal and coronal reconstructions and maximum intensity projection reconstructions were performed. Arterial stenosis percentages are based on NASCET measurement criteria.  Two additional phases of immediate post-contrast CTA images were performed through the head alone.    COMPARISON:  CT head 05/14/2019.    FINDINGS:  Non-Vascular Structures of the Neck/Thoracic Inlet (limited evaluation): Paraseptal emphysematous changes involving bilateral lung apices.  Small hypoattenuating nodule within the right thyroid lobe which is likely of minimal clinical significance given lesion size and patient age.    Aorta: Normal 3 vessel arch.    Extracranial carotid circulation: Multifocal irregularity and mild narrowing suggestive for atherosclerotic change.  No hemodynamically significant stenosis, aneurysmal dilatation, or dissection.    Extracranial vertebral circulation: Multifocal irregularity and mild narrowing suggestive for atherosclerotic change.  The left vertebral artery dominant.   No hemodynamically significant stenosis, aneurysmal dilatation, or dissection.    Intracranial Arteries:    Multifocal irregularity and mild narrowing suggestive for atherosclerotic change.  There is a long segment string like narrowing of the left A1. There is a large calcified nonocclusive plaque involving the V4 segment of the left vertebral artery.  The remainder of the intracranial arteries are within normal limits without focal stenosis, aneurysmal dilatation, or major branch vessel  occlusion.    Venous structures (limited evaluation): No evidence thrombosis or occlusion.  Incidental note made of a dehiscent right jugular bulb.                               CT Head Without Contrast (Final result)  Result time 05/14/19 22:32:05    Final result by Kvng Churchill MD (05/14/19 22:32:05)                 Impression:      1.  No CT evidence of acute intracranial abnormality.  If there is concern for acute ischemia, further evaluation could be performed with MRI.    2.  Patchy periventricular and supratentorial white matter hypoattenuation, nonspecific, although suggestive sequelae of chronic microvascular ischemic change.      Electronically signed by: Kvng Churchill MD  Date:    05/14/2019  Time:    22:32             Narrative:    EXAMINATION:  CT HEAD WITHOUT CONTRAST    CLINICAL HISTORY:  Focal neuro deficit, new, fixed or worsening, <6 hours;    TECHNIQUE:  Low dose axial images were obtained through the head.  Coronal and sagittal reformations were also performed. Contrast was not administered.    COMPARISON:  None.    FINDINGS:  There is mild generalized cerebral volume loss.  There is patchy periventricular and supratentorial white matter hypoattenuation, particularly within the left corona radiata, suggestive of sequelae of chronic microvascular ischemic change.  No large region of abnormal parenchymal hypoattenuation identified.  There is no intracranial hemorrhage or midline shift.  No extra-axial fluid collections appreciated.  The basal cisterns are patent. The mastoid air cells and paranasal sinuses are clear of acute process. The visualized bones of the calvarium demonstrate no acute osseous abnormality.                                 Medical Decision Making:   History:   Old Medical Records: I decided to obtain old medical records.  Independently Interpreted Test(s):   I have ordered and independently interpreted EKG Reading(s) - see prior notes  Clinical Tests:   Lab Tests:  Ordered and Reviewed  Radiological Study: Ordered and Reviewed  Medical Tests: Ordered and Reviewed  ED Management:  Emergent evaluation of acute neurologic deficits. Report taken from EMS and pre arrival steps were taken by nursing staff. Patient evaluated immediately upon arrival and concur with stroke code. Patient taken emergently to CT scanner. Vascular neurology here evaluating the patient. Given his blood pressure, I would be concerned for intracranial hemorrhage.   Other:   I have discussed this case with another health care provider.       <> Summary of the Discussion: 10:04 PM  Vascular neurology evaluating the patient.             Scribe Attestation:   Scribe #1: I performed the above scribed service and the documentation accurately describes the services I performed. I attest to the accuracy of the note.    Attending Attestation:         Attending Critical Care:   Critical Care Times:   Direct Patient Care (initial evaluation, reassessments, and time considering the case)................................................................20 minutes.   Additional History from reviewing old medical records or taking additional history from the family, EMS, PCP, etc.......................5 minutes.   Ordering, Reviewing, and Interpreting Diagnostic Studies...............................................................................................................5 minutes.   Documentation..................................................................................................................................................................................5 minutes.   Consultation with other Physicians. .................................................................................................................................................10 minutes.   ==============================================================  · Total Critical Care Time - exclusive of procedural time: 45  minutes.  ==============================================================  Critical care was necessary to treat or prevent imminent or life-threatening deterioration of the following conditions: stroke and hypertensive urgency.       Attending ED Notes:   10:16 PM  No acute intracranial bleed seen on CT.     10:27 PM  Treating patient's hypertension with Cardene. In conjunction with vascular neurology, feel TPA is in the patient's best interest.     10:51 PM  After blood pressure control symptoms have now resolved. Will not give TPA.     12:34 AM  Patient was transitioned off Cardene and given oral blood pressure medication. He still endorses mild symptoms of speech difficulty and left arm heaviness. CTA without large vessel occlusion. Patient will be admitted to vascular neurology.              Clinical Impression:       ICD-10-CM ICD-9-CM   1. Stroke I63.9 434.91   2. Episode of transient neurologic symptoms R29.90 781.99         Disposition:   Disposition: Admitted                        Claudette Velazquez MD  05/15/19 0051

## 2019-05-15 NOTE — ED NOTES
Pt c/o more weakness in his left arm than earlier. Called Latasha who advised she would place orders for STAT CT.

## 2019-05-15 NOTE — ED NOTES
Spoke with ERASMO Welch with Stroke team.  She is aware of BP-states wants SBP<220.  Will also change pt's diet.

## 2019-05-15 NOTE — PROGRESS NOTES
[facility]  Vascular Neurology  Comprehensive Stroke Center  Progress Note    Assessment/Plan:     * Thrombotic stroke involving right middle cerebral artery  58 y.o. male with significant past medical history of HTN, smoking presented to hospital complaining of acute onset left sided weakness and dysarthria that began ~40 min PTA in the ED.  Initial SBP 200s.  Cardene started.  CTH negative for acute findings.  SBP remained >180 on Cardene.  CTA negative for LVO. During CTA patient's SBP<180, symptoms greatly improved. Patient able to ambulate with steady gait and without assistance.  tPA not administered due to rapidly improving symptoms.      Etiology likely small vessel disease     Antithrombotics for secondary stroke prevention: Antiplatelets: Aspirin: 325 mg daily  Clopidogrel: 75 mg daily (no plavix load; patient has hx of GI bleed while previously on plavix)     Statins for secondary stroke prevention and hyperlipidemia, if present:   Statins: Atorvastatin- 80 mg daily    Aggressive risk factor modification: HTN, Smoking, HLD     Rehab efforts: The patient has been evaluated by a stroke team provider and the therapy needs have been fully considered based off the presenting complaints and exam findings. The following therapy evaluations are needed: PT evaluate and treat, OT evaluate and treat, SLP evaluate and treat     Diagnostics ordered/pending: none     VTE prophylaxis: Heparin 5000 units SQ every 8 hours, SCDs    BP parameters: Infarct: No intervention, SBP <220        Dysarthria and anarthria  -Stroke symptom.  SLP recommending mechanical soft diet, thin liquids. Will need outpatient SLP.     Compulsive tobacco user syndrome  -Stroke risk factor.  Patient states he is a current smoker and smokes ~ 1ppd.  -Encourage smoking cessation  -Nicotine patch prn    Essential hypertension  -Stroke risk factor.  SBP initially 200s.  Started on Cardene in the ED that eventually decreased  patient's BP with subsequent improvement of his symptoms.  -SBP<220 for now  -Resume home meds when appropriate.    Mixed hyperlipidemia  -Stroke risk factor.   - LDL 83.6.  -Continue Atorvastatin 80 mg daily         5/15: Admitted overnight for acute LSW and dysarthria. SBP 200s in ED. CT head negative. No TPA given due to great improvement in symptoms. MRI R thalamic acute infarct. Echo complete. DAPT started (no load; previous hx of rectal bleeding while on plavix) and statin. OT reccommend outpatient rehab. PT eval pending.     STROKE DOCUMENTATION   Acute Stroke Times   Last Known Normal Date: 05/14/19  Last Known Normal Time: 2125  Symptom Onset Date: 05/14/19  Symptom Onset Time: 2125  Stroke Team Called Date: 05/14/19  Stroke Team Called Time: 2157  Stroke Team Arrival Date: 05/14/19  Stroke Team Arrival Time: 2203  CT Interpretation Time: 2207  Decision to Treat Time for Alteplase: 2215(delay in administration 2/2 SBP>185; Cardene started)    NIH Scale:  1a. Level of Consciousness: 0-->Alert, keenly responsive  1b. LOC Questions: 0-->Answers both questions correctly  1c. LOC Commands: 0-->Performs both tasks correctly  2. Best Gaze: 0-->Normal  3. Visual: 0-->No visual loss  4. Facial Palsy: 1-->Minor paralysis (flattened nasolabial fold, asymmetry on smiling)  5a. Motor Arm, Left: 1-->Drift, limb holds 90 (or 45) degrees, but drifts down before full 10 seconds, does not hit bed or other support  5b. Motor Arm, Right: 0-->No drift, limb holds 90 (or 45) degrees for full 10 secs  6a. Motor Leg, Left: 1-->Drift, leg falls by the end of the 5-sec period but does not hit bed  6b. Motor Leg, Right: 0-->No drift, leg holds 30 degree position for full 5 secs  7. Limb Ataxia: 2-->Present in two limbs  8. Sensory: 0-->Normal, no sensory loss  9. Best Language: 0-->No aphasia, normal  10. Dysarthria: 1-->Mild-to-moderate dysarthria, patient slurs at least some words and, at worst, can be understood with some  difficulty  11. Extinction and Inattention (formerly Neglect): 0-->No abnormality  Total (NIH Stroke Scale): 6       Modified Napanoch Score: 0  Leslye Coma Scale:    ABCD2 Score:    [id number] Score:   HAS -BLED Score:   ICH Score:   Hunt & Kim Classification:      Hemorrhagic change of an Ischemic Stroke: Does this patient have an ischemic stroke with hemorrhagic changes? No     Neurologic Chief Complaint: left side weakness and dysarthria     Subjective:     Interval History: Patient is seen for follow-up neurological assessment and treatment recommendations: Admitted overnight for acute LSW and dysarthria. SBP 200s in ED. CT head negative. No TPA given due to great improvement in symptoms. MRI R thalamic acute infarct. Echo complete. DAPT started (no load; previous hx of rectal bleeding while on plavix) and statin. OT reccommend outpatient rehab. PT eval pending.     HPI, Past Medical, Family, and Social History remains the same as documented in the initial encounter.     Review of Systems   Constitutional: Negative for chills and fever.   Eyes: Negative for visual disturbance.   Respiratory: Negative for shortness of breath.    Cardiovascular: Negative for chest pain.   Gastrointestinal: Negative for nausea and vomiting.   Neurological: Positive for facial asymmetry, speech difficulty and weakness. Negative for numbness and headaches.   Psychiatric/Behavioral: Negative for confusion.     Scheduled Meds:   aspirin  325 mg Oral Daily    atorvastatin  80 mg Oral Daily    clopidogrel  75 mg Oral Daily    docusate sodium  100 mg Oral BID    ferrous sulfate  325 mg Oral Daily    heparin (porcine)  5,000 Units Subcutaneous Q8H    nicotine  1 patch Transdermal Daily    pantoprazole  40 mg Oral Daily     Continuous Infusions:   sodium chloride 0.9%       PRN Meds:labetalol, ondansetron, ondansetron, sodium chloride 0.9%, sodium chloride 0.9%    Objective:     Vital Signs (Most Recent):  Temp: 96.5 °F (35.8  °C) (05/15/19 1653)  Pulse: 62 (05/15/19 1653)  Resp: 18 (05/15/19 1653)  BP: (!) 185/85 (05/15/19 1653)  SpO2: (!) 94 % (05/15/19 1653)  BP Location: Left arm    Vital Signs Range (Last 24H):  Temp:  [96.5 °F (35.8 °C)-97.8 °F (36.6 °C)]   Pulse:  [50-72]   Resp:  [18]   BP: (130-199)/(57-93)   SpO2:  [92 %-98 %]   BP Location: Left arm    Physical Exam   Constitutional: He is oriented to person, place, and time. He appears well-developed.   HENT:   Head: Normocephalic.   Poor dental health   Eyes: Pupils are equal, round, and reactive to light.   Neck: Normal range of motion.   Cardiovascular: Normal rate.   Pulmonary/Chest: Effort normal.   Neurological: He is alert and oriented to person, place, and time. He exhibits abnormal muscle tone.   See below   Skin: Skin is warm and dry.   Psychiatric: His speech is slurred.   Nursing note and vitals reviewed.      Neurological Exam:   LOC: alert  Attention Span: Good   Language: No aphasia  Articulation: Dysarthria  Orientation: Person, Place, Time   Visual Fields: Full  EOM (CN III, IV, VI): Full/intact  Facial Sensation (CN V): Normal  Facial Movement (CN VII): Lower facial weakness on the Left  Motor: Arm left  Paresis: 3/5  Leg left  Paresis: 4/5  Arm right  Normal 5/5  Leg right Normal 5/5  Sensation: Intact to light touch, temperature and vibration    Laboratory:  CMP:   Recent Labs   Lab 05/15/19  0440   CALCIUM 9.1   ALBUMIN 3.3*   PROT 7.1      K 4.3   CO2 24      BUN 20   CREATININE 0.9   ALKPHOS 84   ALT 15   AST 14   BILITOT 0.2     BMP:   Recent Labs   Lab 05/15/19  0440      K 4.3      CO2 24   BUN 20   CREATININE 0.9   CALCIUM 9.1     CBC:   Recent Labs   Lab 05/15/19  0440   WBC 9.68   RBC 5.15   HGB 14.7   HCT 46.3      MCV 90   MCH 28.5   MCHC 31.7*     Lipid Panel:   Recent Labs   Lab 05/14/19  2219   CHOL 134   LDLCALC 83.6   HDL 29*   TRIG 107     Hgb A1C:   Recent Labs   Lab 05/15/19  0101   HGBA1C 5.5     TSH:    Recent Labs   Lab 05/14/19  2219   TSH 1.292       Diagnostic Results      Brain imaging:  CTH 5/14/19   1.  No CT evidence of acute intracranial abnormality.  If there is concern for acute ischemia, further evaluation could be performed with MRI.  2.  Patchy periventricular and supratentorial white matter hypoattenuation, nonspecific, although suggestive sequelae of chronic microvascular ischemic change.     MRI Brain 5/15/19       Small focus of minimal restricted diffusion in the anterior aspect of the right thalamus, likely a recent lacunar infarct.    Bilateral remote lacunar infarcts.    Senescent and chronic microvascular ischemic changes.    Vessel Imaging:  CTA Stroke MP 5/14/19   No CT findings of early ischemic changes.  MRI may be obtained for further evaluation.  Mild multifocal narrowing involving the remainder of the intracranial vessels without evidence of hemodynamically significant stenosis or large vessel occlusion.  Diffuse string like narrowing of the A1 segment of the left vertebral artery.  The chronicity of this is difficult to determine.  No hemodynamically significant stenosis of the neck vessels.  Incidental note made of dehiscent right jugular bulb.  Paraseptal emphysematous changes of bilateral lung apices.      Cardiac Evaluation:   TTE 5/15/19  · Mild left atrial enlargement.  · Low normal left ventricular systolic function. The estimated ejection fraction is 50%  · Eccentric left ventricular hypertrophy.  · Moderate left ventricular enlargement.  · Indeterminate left ventricular diastolic function.  · Mild right atrial enlargement.  · Normal right ventricular systolic function.  · Mild mitral regurgitation.        Mark Villar NP  Comprehensive Stroke Center  Department of Vascular Neurology   [facility]

## 2019-05-15 NOTE — ED NOTES
Neurological exam performed as documented.  Pt's speech is slurred; family states slurred speech had initially resolved but states it has recently returned. Pt found with left facial droop and LUE weakness with drift; not new symptoms. Pt made aware of order for Plavix and Heparin; states he was on Plavix in the past and had rectal bleeding.  Call made to vascular neurology; informed of return of slurred speech and pt's concerns regarding Plavix.  Vascular neurology states to administer the Plavix and Heparin and states she will come down to re-evaluate pt.

## 2019-05-15 NOTE — PT/OT/SLP EVAL
"Speech Language Pathology Evaluation  Cognitive/Bedside Swallow    Patient Name:  Jamari Huerta III   MRN:  [id number]  Admitting Diagnosis: Episode of transient neurologic symptoms    Recommendations:                  General Recommendations:  Speech/language therapy, Cognitive-linguistic therapy and monitor diet tolerance  Diet recommendations:  Dental Soft, Thin   Aspiration Precautions: 1 bite/sip at a time, Avoid talking while eating, Check for pocketing/oral residue, Eliminate distractions, HOB to 90 degrees, Monitor for s/s of aspiration, Small bites/sips and Standard aspiration precautions   General Precautions: Standard, fall, NPO  Communication strategies:  none    History:     Past Medical History:   Diagnosis Date    Abnormal PFT     Cardiac murmur     Cardiomegaly     CKD (chronic kidney disease)     Coronary artery disease     Depression     DVT (deep venous thrombosis)     left lower leg    Dyspnea     Edema     GI bleed     High cholesterol     Hypertension     PVD (peripheral vascular disease)        Past Surgical History:   Procedure Laterality Date    VASCULAR SURGERY      left lower leg       Social History: Patient lives with his wife and family in [place].     Prior Intubation HX:  None this admission    Modified Barium Swallow: None on file    Chest X-Rays 5/14/19: Lungs are hyperinflated and demonstrate bilateral emphysematous changes.  No confluent area of consolidation.  No sizable pleural effusion.  No pneumothorax.    Prior diet: regular/thin.    Occupation/hobbies/homemaking: Patient is on disability and has been "forever". He enjoys watching [employer] and sports on TV.    Subjective     Patient awake and alert during session  "Give me more. I want to finish it." Patient impulsive with PO intake during session  Person in room with patient described herself as his "ex", but stated that they had remained close  Communicated with nurse prior to session     Pain/Comfort:  · Pain " Rating 1: 0/10  · Pain Rating Post-Intervention 1: 0/10    Objective:     Cognitive Status:    Arousal/Alertness Appropriate response to stimuli  Attention Sustained attention deficit present as session progressed, but easily redirected to task  Orientation Person, Place and Situation independently, with mod assist for time  Memory Immediate Recall: WFL, Delayed Recall: independently recalled 2/4 unrelated objects following a 5-minute delay, increasing to 4/4 with max assist and recall general information: mod assist (he recalled address, but was unable to give accurate directions from the hospital to his home)  Problem Solving Conclusions: WFL and Compare/contrast: mod assist  Safety awareness impulsive during session, particularly with PO intake      Receptive Language:   Comprehension:   Questions Complex yes/no WFL  Commands  two step basic commands WFL  multistep basic commands 2/3 with mod assist    Pragmatics:    inconsistent eye contact throguhout session    Expressive Language:  Verbal:    Repetition Sentences: WFL  Naming Confrontation: WFL and Divergent responsive: independently named 7 items in a category in one minute, increasing to 9 with mod assist      Motor Speech:  Patient presents with ataxic dysarthria that is more severe across longer utterances. Dysarthria does not significant affect intelligiblity across contexts    Voice:   WFL    Visual-Spatial:  TBA    Reading:   TBA     Written Expression:   TBA    Oral Musculature Evaluation  · Oral Musculature: left weakness  · Dentition: present and adequate  · Secretion Management: adequate  · Mucosal Quality: good  · Mandibular Strength and Mobility: WFL  · Oral Labial Strength and Mobility: impaired retraction, impaired pursing  · Lingual Strength and Mobility: impaired left lateral movement  · Buccal Strength and Mobility: WFL  · Volitional Cough: adequate  · Volitional Swallow: timely; good laryngeal elevation  · Voice Prior to PO Intake:  "WFL    Bedside Swallow Eval:   Consistencies Assessed:  · Thin liquids x6 (via cup edge)  · Solids x3 (alexis crackers)     Oral Phase:   · Mild residue in left buccal cavity which patient sensed and cleared effectively with a tongue sweep    Pharyngeal Phase:   · no overt clinical signs/symptoms of aspiration  · no overt clinical signs/symptoms of pharyngeal dysphagia    Compensatory Strategies  · Lingual sweep was effective in clearing mild residue     Treatment: Patient seen for bedside swallow eval and a speech/language/cognitive assessment. He was sitting up in bed with his "ex" in the room during session. Patient requested that lights remain off during session 2' to light sensitivity. SLP educated patient and SO regarding POC for patient and they verbalized understanding. No further questions at this time. Diet recs communicated to treatment team.    Assessment:     Jamari Huerta III is a 58 y.o. male with an SLP diagnosis of Dysarthria and Cognitive-Linguistic Impairment.      Goals:   Multidisciplinary Problems     SLP Goals        Problem: SLP Goal    Goal Priority Disciplines Outcome   SLP Goal     SLP    Description:  Speech-Language Pathology Goals   Goals to be met by 5/22/19  1. Patient will tolerate a DYSPHAGIA SOFT DIET/THIN LIQUIDS with no s/s of aspiration.  2. Patient will independently recall 4/4 unrelated objects with a 5-minute interval.  3. Patient will answer high-level problem solving tasks with 80% accuracy and min assist.   4. Patient will independently follow complex directions with 80% accuracy.  5. Patient will independently recall and utilize 3 clear speech strategies during session.  6. Patient will independently name 12 items in a category in 1 minute.  7. Patient will complete safety awareness tasks with 90% accuracy and min assist.  8. Patient will participate in ongoing assessment of reading/writing/visuospatial skills.                     Plan:     · Patient to be seen:  4 x/week "   · Plan of Care expires:  06/14/19  · Plan of Care reviewed with:  patient, significant other   · SLP Follow-Up:  Yes       Discharge recommendations:  Discharge Facility/Level of Care Needs: (pending OT/PT recs)   Barriers to Discharge:  Level of Skilled Assistance Needed     Time Tracking:     SLP Treatment Date:   05/15/19  Speech Start Time:  0947  Speech Stop Time:  1007     Speech Total Time (min):  20 min    Billable Minutes: Eval 12  and Eval Swallow and Oral Function 8    SANTOS Helton-SLP  Speech-Language Pathology  Pager: [phone]   05/15/2019

## 2019-05-15 NOTE — PLAN OF CARE
Problem: SLP Goal  Goal: SLP Goal  Speech-Language Pathology Goals   Goals to be met by 5/22/19  1. Patient will tolerate a DYSPHAGIA SOFT DIET/THIN LIQUIDS with no s/s of aspiration.  2. Patient will independently recall 4/4 unrelated objects with a 5-minute interval.  3. Patient will answer high-level problem solving tasks with 80% accuracy and min assist.   4. Patient will independently follow complex directions with 80% accuracy.  5. Patient will independently recall and utilize 3 clear speech strategies during session.  6. Patient will independently name 12 items in a category in 1 minute.  7. Patient will participate in ongoing assessment of reading/writing/visuospatial skills.  Patient seen for a bedside swallow eval and a speech/language/cognitive assessment. SLP recommending a DYSPHAGIA SOFT DIET/THIN LIQUIDS at this time.     Luis Luis CF-SLP  Speech-Language Pathology  Pager: [phone]

## 2019-05-15 NOTE — CONSULTS
Nutrition consult received regarding stroke pathway. Pt passed ROSALINA, diet advanced. Lipid panel & A1C WNL. Pt w/ no education needs at this time.

## 2019-05-15 NOTE — ED NOTES
Pt care assumed.  Pt AAO x 4.  Pt lying on stretcher, sleeping, arouses easily to voice.  Wife at bedside.  Pt updated on plan for admission-states understanding.  No new c/o at present.  Side rails up, will continue to monitor.

## 2019-05-15 NOTE — H&P
[facility]  Vascular Neurology  Comprehensive Stroke Center  History & Physical    Inpatient consult to Vascular (Stroke) Neurology  Consult performed by: Latasha Manzo DNP, NP  Consult ordered by: Latasha Manzo DNP, NP  Reason for consult: left sided weakness, dysarthria        Assessment/Plan:     * Episode of transient neurologic symptoms  58 y.o. male with significant past medical history of HTN, smoking presented to hospital complaining of acute onset left sided weakness and dysarthria that began ~40 min PTA in the ED.  Initial SBP 200s.  Cardene started.  CTH negative for acute findings.  SBP remained >180 on Cardene.  CTA negative for LVO.  During CTA patient's SBP<180, symptoms greatly improved. Patient able to ambulate with steady gait and without assistance.  tPA not administered due to rapidly improving symptoms.    Antithrombotics for secondary stroke prevention: Antiplatelets: Aspirin: 81 mg daily    Statins for secondary stroke prevention and hyperlipidemia, if present:   Statins: Atorvastatin- 80 mg daily    Aggressive risk factor modification: HTN, Smoking, HLD     Rehab efforts: The patient has been evaluated by a stroke team provider and the therapy needs have been fully considered based off the presenting complaints and exam findings. The following therapy evaluations are needed: PT evaluate and treat, OT evaluate and treat, SLP evaluate and treat    Diagnostics ordered/pending: MRI head without contrast to assess brain parenchyma, TTE to assess cardiac function/status     VTE prophylaxis: None: Reason for No Pharmacological VTE Prophylaxis: Ambulating with or without assistance, Mechanical prophylaxis: Place SCDs, GENEVIEVE hose    BP parameters: SBP<220 until no stroke confirmed        BP (high blood pressure)  -Stroke risk factor.  SBP initially 200s.  Started on Cardene in the ED that eventually decreased patient's BP with subsequent improvement of his symptoms.  -SBP<220  for now  -Resume home meds when appropriate.    Dysarthria and anarthria  -Stroke symptom.  SLP evaluation pending.    Compulsive tobacco user syndrome  -Stroke risk factor.  Patient states he is a current smoker and smokes ~ 1ppd.  -Encourage smoking cessation  -Nicotine patch prn    Hyperlipidemia  -Stroke risk factor.  LDL 83.6.  -Continue Atorvastatin 80 mg daily        STROKE DOCUMENTATION     Acute Stroke Times   Last Known Normal Date: 05/14/19  Last Known Normal Time: 2125  Symptom Onset Date: 05/14/19  Symptom Onset Time: 2125  Stroke Team Called Date: 05/14/19  Stroke Team Called Time: 2157  Stroke Team Arrival Date: 05/14/19  Stroke Team Arrival Time: 2203  CT Interpretation Time: 2207  Decision to Treat Time for Alteplase: 2215(delay in administration 2/2 SBP>185; Cardene started)    NIH Scale:  Interval: baseline  1a. Level of Consciousness: 0-->Alert, keenly responsive  1b. LOC Questions: 0-->Answers both questions correctly  1c. LOC Commands: 0-->Performs both tasks correctly  2. Best Gaze: 0-->Normal  3. Visual: 0-->No visual loss  4. Facial Palsy: 1-->Minor paralysis (flattened nasolabial fold, asymmetry on smiling)  5a. Motor Arm, Left: 1-->Drift, limb holds 90 (or 45) degrees, but drifts down before full 10 seconds, does not hit bed or other support  5b. Motor Arm, Right: 0-->No drift, limb holds 90 (or 45) degrees for full 10 secs  6a. Motor Leg, Left: 1-->Drift, leg falls by the end of the 5-sec period but does not hit bed  6b. Motor Leg, Right: 0-->No drift, leg holds 30 degree position for full 5 secs  7. Limb Ataxia: 1-->Present in one limb  8. Sensory: 0-->Normal, no sensory loss  9. Best Language: 0-->No aphasia, normal  10. Dysarthria: 1-->Mild-to-moderate dysarthria, patient slurs at least some words and, at worst, can be understood with some difficulty  11. Extinction and Inattention (formerly Neglect): 0-->No abnormality  Total (NIH Stroke Scale): 5     Modified Craven Score: 0  Leslye  "Coma Scale:15   ABCD2 Score:    [id number] Score:   HAS -BLED Score:   ICH Score:   Hunt & Kim Classification:      Thrombolysis Candidate? No, rapidly improving symptoms    Delays to Thrombolysis?     Interventional Revascularization Candidate?   Is the patient eligible for mechanical endovascular reperfusion (TAMIA)?  No; No large vessel occlusion    Hemorrhagic change of an Ischemic Stroke: Does this patient have an ischemic stroke with hemorrhagic changes? No         Subjective:     History of Present Illness:  Patient is a 58 y.o. male with significant past medical history of HTN, smoking presented to hospital complaining of acute onset left sided weakness and dysarthria.  LKN ~2130.  The patient was in his usual state of health and had gone to [employer].  After leaving the store the patient noted LUE "heaviness".  He states he also noted slurred speech.  EMS was activated and noted patient's SBP>200.  A stroke code was activated and the patient was brought to the ED.  On arrival the patient is AA&O x3.  He denies HA, CP, N/V, change in vision.  Nothing exacerbated or alleviated his symptoms.  He states he took his BP meds this evening.  A stat CTH was obtained and is negative for acute findings.  Patient has no contraindications for tPA, however his SBP is >180.  Cardene was started.  A CTA Stroke MP was obtained and reviewed as images were acquired by Dr. Menchaca.  No LVO.  Just after completion of the CT the patient's SBP improved to 170s.  Upon return to the ED room the patient was reassessed.  No left sided drift, improved speech and facial droop.  Patient able to ambulate in the ED room with steady gait and without assistance.  He remains w/o complaint of HA or vision change but does endorse feeling light headed.  Will not receive tPA at this time due to rapidly improving symptoms.               Past Medical History:   Diagnosis Date    Abnormal PFT     Cardiac murmur     Cardiomegaly     CKD (chronic kidney " disease)     Coronary artery disease     Depression     DVT (deep venous thrombosis)     left lower leg    Dyspnea     Edema     GI bleed     High cholesterol     Hypertension     PVD (peripheral vascular disease)      Past Surgical History:   Procedure Laterality Date    VASCULAR SURGERY      left lower leg     Family History   Problem Relation Age of Onset    Diabetes Mother      Social History     Tobacco Use    Smoking status: Current Every Day Smoker     Packs/day: 1.00     Years: 48.00     Pack years: 48.00     Types: Cigarettes     Last attempt to quit: 2017     Years since quittin.3    Smokeless tobacco: Never Used   Substance Use Topics    Alcohol use: No     Comment: Hx of drinking alot when younger    Drug use: No     Review of patient's allergies indicates:   Allergen Reactions    Morphine Itching    Plavix [clopidogrel]      bleeding    Zyban [bupropion hcl (smoking deter)] Swelling       Medications: I have reviewed the current medication administration record.    No current facility-administered medications on file prior to encounter.      Current Outpatient Medications on File Prior to Encounter   Medication Sig Dispense Refill    amLODIPine (NORVASC) 10 MG tablet       aspirin (ECOTRIN) 81 MG EC tablet Take 81 mg by mouth once daily.      atorvastatin (LIPITOR) 80 MG tablet Take 80 mg by mouth once daily.  2    cloNIDine (CATAPRES) 0.1 MG tablet Take 0.1 mg by mouth 2 (two) times daily.      fosinopril-hydrochlorothiazide (MONOPRIL-HCT) 20-12.5 mg per tablet Take 1 tablet by mouth once daily.      diazePAM (VALIUM) 10 MG Tab Take 10 mg by mouth.      docusate sodium (COLACE) 100 MG capsule Take 100 mg by mouth 2 (two) times daily.      ferrous sulfate 325 mg (65 mg iron) Tab tablet Take 325 mg by mouth daily with breakfast.      fluoxetine (PROZAC) 20 MG capsule Take 20 mg by mouth once daily.      fluticasone (FLONASE) 50 mcg/actuation nasal spray 1 spray by Each  Nare route once daily.      lidocaine HCL 2% (XYLOCAINE) 2 % jelly Apply topically as needed. 30 mL 0    metoprolol tartrate (LOPRESSOR) 50 MG tablet Take 50 mg by mouth 2 (two) times daily.      pantoprazole (PROTONIX) 40 MG tablet Take 40 mg by mouth once daily.      pramoxine 1 % Foam Place rectally 3 (three) times daily as needed.  0    rosuvastatin (CRESTOR) 20 MG tablet Take 40 mg by mouth once daily.           Review of Systems   Constitutional: Negative for chills, fatigue and fever.   HENT: Negative for drooling, facial swelling and trouble swallowing.    Eyes: Negative for photophobia, pain and visual disturbance.   Respiratory: Positive for shortness of breath. Negative for cough and wheezing.    Cardiovascular: Negative for chest pain, palpitations and leg swelling.   Gastrointestinal: Negative for abdominal pain, constipation, diarrhea, nausea and vomiting.   Endocrine: Negative for cold intolerance and heat intolerance.   Genitourinary: Negative for dysuria and hematuria.   Musculoskeletal: Negative for neck pain and neck stiffness.   Skin: Negative for rash.   Allergic/Immunologic: Negative for environmental allergies and food allergies.   Neurological: Positive for facial asymmetry, speech difficulty, weakness and light-headedness. Negative for dizziness, tremors, numbness and headaches.   Hematological: Negative for adenopathy. Does not bruise/bleed easily.   Psychiatric/Behavioral: Negative for agitation, confusion and hallucinations.     Objective:     Vital Signs (Most Recent):  Pulse: (!) 58 (05/14/19 2308)  BP: (!) 148/59 (05/14/19 2308)  SpO2: (!) 94 % (05/14/19 2308)    Vital Signs Range (Last 24H):  Pulse:  [55-61]   BP: (148-197)/(58-86)   SpO2:  [94 %-96 %]     Physical Exam   Constitutional: He is oriented to person, place, and time. He appears well-developed and well-nourished. No distress.   HENT:   Head: Normocephalic and atraumatic.   Right Ear: External ear normal.   Left Ear:  External ear normal.   Nose: Nose normal.   Mouth/Throat: Oropharynx is clear and moist. No oropharyngeal exudate.   Eyes: Pupils are equal, round, and reactive to light. Conjunctivae and EOM are normal. Right eye exhibits no discharge. Left eye exhibits no discharge. No scleral icterus.   Neck: Normal range of motion. Neck supple. No thyromegaly present.   Cardiovascular: Normal rate, regular rhythm and intact distal pulses.   Murmur heard.  Pulmonary/Chest: Effort normal and breath sounds normal. No respiratory distress. He has no wheezes.   Abdominal: Soft. Bowel sounds are normal. He exhibits no distension. There is no tenderness.   Musculoskeletal: He exhibits no edema.   Lymphadenopathy:     He has no cervical adenopathy.   Neurological: He is alert and oriented to person, place, and time.   Skin: Skin is warm and dry. He is not diaphoretic.   Psychiatric: He has a normal mood and affect.   Nursing note and vitals reviewed.      Neurological Exam:   LOC: alert  Attention Span: Good   Language: No aphasia  Articulation: Dysarthria  Orientation: Person, Place, Time   Visual Fields: Full  EOM (CN III, IV, VI): Full/intact  Pupils (CN II, III): PERRL  Facial Movement (CN VII): Lower facial weakness on the Left  Cebellar: Upper Extremity Appendicular Ataxia (Finger Nose Finger)  Left  Sensation: Intact to light touch, temperature and vibration      Laboratory:  CMP:   Recent Labs   Lab 05/14/19 2219   CALCIUM 9.2   ALBUMIN 3.1*   PROT 7.0      K 4.3   CO2 23      BUN 24*   CREATININE 1.1   ALKPHOS 83   ALT 12   AST 17   BILITOT 0.3     CBC:   Recent Labs   Lab 05/14/19 2219   WBC 9.73   RBC 5.21   HGB 14.8   HCT 46.8      MCV 90   MCH 28.4   MCHC 31.6*     Lipid Panel:   Recent Labs   Lab 05/14/19 2219   CHOL 134   LDLCALC 83.6   HDL 29*   TRIG 107     Coagulation:   Recent Labs   Lab 05/14/19 2219   INR 1.1     Hgb A1C: No results for input(s): HGBA1C in the last 168 hours.  TSH:   Recent  Labs   Lab 05/14/19  2219   TSH 1.292       Diagnostic Results:      Brain imaging:  CTH 5/14/19   1.  No CT evidence of acute intracranial abnormality.  If there is concern for acute ischemia, further evaluation could be performed with MRI.  2.  Patchy periventricular and supratentorial white matter hypoattenuation, nonspecific, although suggestive sequelae of chronic microvascular ischemic change.    MRI Brain pending    Vessel Imaging:  CTA Stroke MP 5/14/19 No CT findings of early ischemic changes.  MRI may be obtained for further evaluation.  Mild multifocal narrowing involving the remainder of the intracranial vessels without evidence of hemodynamically significant stenosis or large vessel occlusion.  Diffuse string like narrowing of the A1 segment of the left vertebral artery.  The chronicity of this is difficult to determine.  No hemodynamically significant stenosis of the neck vessels.  Incidental note made of dehiscent right jugular bulb.  Paraseptal emphysematous changes of bilateral lung apices.     Cardiac Evaluation:   2D Echo pending        Latasha Manzo, CHELSEY, NP  Comprehensive Stroke Center  Department of Vascular Neurology   [facility]

## 2019-05-15 NOTE — ED NOTES
Pt placed on portable telemetry monitoring box # [id number].  Ability to visualize pt's rhythm confirmed with Richard of telemetry.  Sinus bradycardia with a HR of 55 noted.

## 2019-05-15 NOTE — ED NOTES
Pt placed on portable telemetry monitoring box # [id number]; NSR with a HR in the 60's noted; will continue to monitor pt.

## 2019-05-15 NOTE — ASSESSMENT & PLAN NOTE
-Stroke risk factor.  Patient states he is a current smoker and smokes ~ 1ppd.  -Encourage smoking cessation  -Nicotine patch prn

## 2019-05-15 NOTE — PT/OT/SLP EVAL
"Occupational Therapy   Evaluation    Name: Jamari Huerta III  MRN: [id number]  Admitting Diagnosis:  Episode of transient neurologic symptoms      Recommendations:     Discharge Recommendations: outpt OT  Discharge Equipment Recommendations:   none  Barriers to discharge:  None    Assessment:     Jamari Huerta III is a 58 y.o. male with a medical diagnosis of Episode of transient neurologic symptoms.  He presents with performance deficits affecting function: weakness, impaired self care skills, decreased upper extremity function, abnormal tone, impaired fine motor, impaired coordination.      Rehab Prognosis: Good; patient would benefit from acute skilled OT services to address these deficits and reach maximum level of function.       Plan:     Patient to be seen 3 x/week to address the above listed problems via self-care/home management, therapeutic activities, therapeutic exercises, neuromuscular re-education  · Plan of Care Expires: 06/12/19  · Plan of Care Reviewed with: patient, spouse    Subjective     Patient:  "My speech is different and my right arm feels like it's asleep without the pins and needles."     Occupational Profile:  Patient resides in [place] with wife, step dtg and step dtg's boyfriend in first floor apt with no steps to enter.  PTA patient independent with ADLs including driving.  Currently owns a cane.  Patient is right handed.  Unemployed; on disability following left leg surgery.  Hobbies: Sports, TV.  Roles/Responsibilities:  , step father, grandfather.  Pain/Comfort:  · Pain Rating 1: 0/10  · Pain Rating Post-Intervention 1: 0/10    Patients cultural, spiritual, Baptism conflicts given the current situation: (Restorationism)    Objective:     Communicated with: Nurse prior to session.  Patient found supine with blood pressure cuff, peripheral IV, pulse ox (continuous) upon OT entry to room.  Wife present.    General Precautions: Standard, aspiration, fall, NPO   Orthopedic " Precautions:N/A   Braces: N/A     Occupational Performance:    Bed Mobility:    · Patient completed Rolling/Turning to Left with  modified independence  · Patient completed Rolling/Turning to Right with modified independence  · Patient completed Scooting/Bridging with modified independence  · Patient completed Supine to Sit with modified independence  · Patient completed Sit to Supine with modified independence    Functional Mobility/Transfers:  · Patient completed Sit <> Stand Transfer with supervision  with  no assistive device   · Patient completed Bed <> Chair Transfer using Stand Pivot technique with stand by assistance with no assistive device    Activities of Daily Living:  · Upper Body Dressing: Moderate assist while seated EOB  · Lower Body Dressing: moderate assistance while seated EOB    Cognitive/Visual Perceptual:  Cognitive/Psychosocial Skills:     -       Oriented to: Person, Place, Time and Situation   -       Follows Commands/attention:Follows one-step commands  -       Communication: dysarthria  -       Mood/Affect/Coping skills/emotional control: Appropriate to situation and Cooperative    Physical Exam:  Postural examination/scapula alignment:    -       Rounded shoulders  Skin integrity: Visible skin intact  Edema:  None noted  Sensation:    -       Intact  Upper Extremity Range of Motion:     -       Right Upper Extremity: WNL  -       Left Upper Extremity: AAROM WNL  Upper Extremity Strength:    -       Right Upper Extremity: WNL  -       Left Upper Extremity: 2/5 shoulder, wrist; 3/5 elbow    AMPAC 6 Click ADL:  AMPAC Total Score: 12    Treatment & Education:  Patient/ Family education provided for stroke warning signs, prevention guidelines and personal risk factors.  Patient/ Family verbalizing understanding via teach back method.   Patient education provided on role of OT and need for continued OT upon discharge.  Patient education provided on hemiplegic dressing technique, left UE weight  bearing and positioning.  Patient alert and oriented x 3; able to follow 4/4 one step commands.  Patient attentive and interactive throughout the session.  Patient able to identify 3/3 body parts.  Able to name 3/3 objects.  Able to sequence 7/7 days of the week and 12/12 months of the year.   P/AAROM performed left UE one set x 10 rep in all planes of motion with stretches provided at end range; assistance and facilitation provided for upward rotation of the scapula during shoulder flexion and abduction.   Family present during the session.  Continued education, patient/ family training recommended. Patient's functional status and disposition recommendation discussed with stroke team in daily rounds.  White board updated in patient's room.  OT asked if there were any other questions; patient/ family had no further questions.   Education:    Patient left supine with all lines intact, call button in reach and bed alarm on    GOALS:   Multidisciplinary Problems     Occupational Therapy Goals        Problem: Occupational Therapy Goal    Goal Priority Disciplines Outcome Interventions   Occupational Therapy Goal     OT, PT/OT     Description:  Goals set 5/15 to be addressed for 7 days with expiration date, 5/22:  Patient will increase functional independence with ADLs by performing:    Patient will demonstrate grooming while standing with CGA.   Not met  Patient will demonstrate upper body dressing with min assist while seated EOB.   Not met  Patient will demonstrate lower body dressing with min assist while seated EOB.   Not met  Patient will demonstrate toileting with CGA.   Not met  Patient will demonstrate bathing while seated EOB with min assist.   Not met  Patient will demonstrate independence with HEP for left UE weight bearing and AAROM.    Not met  Patient's family / caregiver will demonstrate independence and safety with assisting patient with self-care skills and functional mobility.     Not met  Patient's  family / caregiver will demonstrate independence with providing ROM and changes in bed positioning.   Not met  Patient and/or patient's family will verbalize understanding of stroke prevention guidelines, personal risk factors and stroke warning signs via teachback method.  Not met                           History:     Past Medical History:   Diagnosis Date    Abnormal PFT     Cardiac murmur     Cardiomegaly     CKD (chronic kidney disease)     Coronary artery disease     Depression     DVT (deep venous thrombosis)     left lower leg    Dyspnea     Edema     GI bleed     High cholesterol     Hypertension     PVD (peripheral vascular disease)        Past Surgical History:   Procedure Laterality Date    VASCULAR SURGERY      left lower leg       Time Tracking:     OT Date of Treatment: 05/15/19  OT Start Time: 0610  OT Stop Time: 0634  OT Total Time (min): 24 min    Billable Minutes:Evaluation 14  Therapeutic Activity 10    MIAN Beadr  5/15/2019

## 2019-05-15 NOTE — PLAN OF CARE
Problem: Occupational Therapy Goal  Goal: Occupational Therapy Goal  OT evaluation completed.  MIAN Beard  5/15/2019

## 2019-05-15 NOTE — ASSESSMENT & PLAN NOTE
-Stroke risk factor.  SBP initially 200s.  Started on Cardene in the ED that eventually decreased patient's BP with subsequent improvement of his symptoms.  -SBP<220 for now  -Resume home meds when appropriate.

## 2019-05-15 NOTE — PROGRESS NOTES
Patient assisted to the MRI bed,, tele and O2 sensor placed,, positioned in MRI,, tolerating well,, WCTM

## 2019-05-15 NOTE — HPI
"Patient is a 58 y.o. male with significant past medical history of HTN, smoking presented to hospital complaining of acute onset left sided weakness and dysarthria.  LKN ~2130.  The patient was in his usual state of health and had gone to [employer].  After leaving the store the patient noted LUE "heaviness".  He states he also noted slurred speech.  EMS was activated and noted patient's SBP>200.  A stroke code was activated and the patient was brought to the ED.  On arrival the patient is AA&O x3.  He denies HA, CP, N/V, change in vision.  Nothing exacerbated or alleviated his symptoms.  He states he took his BP meds this evening.  A stat CTH was obtained and is negative for acute findings.  Patient has no contraindications for tPA, however his SBP is >180.  Cardene was started.  A CTA Stroke MP was obtained and reviewed as images were acquired by Dr. Menchaca.  No LVO.  Just after completion of the CT the patient's SBP improved to 170s.  Upon return to the ED room the patient was reassessed.  No left sided drift, improved speech and facial droop.  Patient able to ambulate in the ED room with steady gait and without assistance.  He remains w/o complaint of HA or vision change but does endorse feeling light headed.  Will not receive tPA at this time due to rapidly improving symptoms.       MRI shows acute R thalamic infarct. Etiology small vessel disease. Patient put on DAPT and continuing home Lipitor. PT/OT/SLP recommend outpatient therapy. Patient educated on aggressive risk factor modification. Follow up in VN clinic 4-6 weeks.       "

## 2019-05-15 NOTE — ED NOTES
Pt updated on room status.  States understanding.  Pt requesting food tray, will page admit team for orders.  Pt states understanding.  No new c/o at present.  Will continue to monitor.

## 2019-05-15 NOTE — ED TRIAGE NOTES
Pt transported to ED via  EMS from home. Pt was last seen normal @ 2100 when he had gone to [facility]. PT initially presented with slurred speech left facial droop and left sided weakness. Pt was Hypertensive upon EMS arrival.

## 2019-05-16 VITALS
WEIGHT: 224.19 LBS | BODY MASS INDEX: 30.37 KG/M2 | DIASTOLIC BLOOD PRESSURE: 77 MMHG | HEIGHT: 72 IN | SYSTOLIC BLOOD PRESSURE: 179 MMHG | RESPIRATION RATE: 18 BRPM | TEMPERATURE: 98 F | HEART RATE: 59 BPM | OXYGEN SATURATION: 92 %

## 2019-05-16 PROCEDURE — 92507 TX SP LANG VOICE COMM INDIV: CPT

## 2019-05-16 PROCEDURE — 25000003 PHARM REV CODE 250: Performed by: NURSE PRACTITIONER

## 2019-05-16 PROCEDURE — 25000003 PHARM REV CODE 250: Performed by: EMERGENCY MEDICINE

## 2019-05-16 PROCEDURE — 92526 ORAL FUNCTION THERAPY: CPT

## 2019-05-16 PROCEDURE — 97535 SELF CARE MNGMENT TRAINING: CPT

## 2019-05-16 PROCEDURE — 99233 SBSQ HOSP IP/OBS HIGH 50: CPT | Mod: ,,, | Performed by: PSYCHIATRY & NEUROLOGY

## 2019-05-16 PROCEDURE — 99233 PR SUBSEQUENT HOSPITAL CARE,LEVL III: ICD-10-PCS | Mod: ,,, | Performed by: PSYCHIATRY & NEUROLOGY

## 2019-05-16 PROCEDURE — 63600175 PHARM REV CODE 636 W HCPCS: Performed by: FAMILY MEDICINE

## 2019-05-16 PROCEDURE — 25000003 PHARM REV CODE 250: Performed by: FAMILY MEDICINE

## 2019-05-16 PROCEDURE — 97162 PT EVAL MOD COMPLEX 30 MIN: CPT

## 2019-05-16 RX ORDER — ASPIRIN 325 MG
325 TABLET ORAL DAILY
Refills: 0 | COMMUNITY
Start: 2019-05-17 | End: 2020-05-16

## 2019-05-16 RX ORDER — CLOPIDOGREL BISULFATE 75 MG/1
75 TABLET ORAL DAILY
Qty: 30 TABLET | Refills: 0 | Status: SHIPPED | OUTPATIENT
Start: 2019-05-17 | End: 2023-11-16

## 2019-05-16 RX ORDER — PANTOPRAZOLE SODIUM 40 MG/1
40 TABLET, DELAYED RELEASE ORAL DAILY
Qty: 30 TABLET | Refills: 0 | Status: SHIPPED | OUTPATIENT
Start: 2019-05-17 | End: 2023-10-27

## 2019-05-16 RX ORDER — CLONIDINE HYDROCHLORIDE 0.1 MG/1
0.1 TABLET ORAL 2 TIMES DAILY
Status: DISCONTINUED | OUTPATIENT
Start: 2019-05-16 | End: 2019-05-16 | Stop reason: HOSPADM

## 2019-05-16 RX ADMIN — HEPARIN SODIUM 5000 UNITS: 5000 INJECTION, SOLUTION INTRAVENOUS; SUBCUTANEOUS at 05:05

## 2019-05-16 RX ADMIN — CLONIDINE HYDROCHLORIDE 0.1 MG: 0.1 TABLET ORAL at 08:05

## 2019-05-16 RX ADMIN — CLOPIDOGREL 75 MG: 75 TABLET, FILM COATED ORAL at 08:05

## 2019-05-16 RX ADMIN — FERROUS SULFATE TAB EC 325 MG (65 MG FE EQUIVALENT) 325 MG: 325 (65 FE) TABLET DELAYED RESPONSE at 08:05

## 2019-05-16 RX ADMIN — PANTOPRAZOLE SODIUM 40 MG: 40 TABLET, DELAYED RELEASE ORAL at 08:05

## 2019-05-16 RX ADMIN — DOCUSATE SODIUM 100 MG: 100 CAPSULE, LIQUID FILLED ORAL at 08:05

## 2019-05-16 RX ADMIN — ATORVASTATIN CALCIUM 80 MG: 20 TABLET, FILM COATED ORAL at 08:05

## 2019-05-16 RX ADMIN — ASPIRIN 325 MG ORAL TABLET 325 MG: 325 PILL ORAL at 08:05

## 2019-05-16 NOTE — MEDICAL/APP STUDENT
[facility]  Vascular Neurology  Comprehensive Stroke Center  Progress Note    Patient Name: Jamari Huerta III  MRN: [id number]  Patient Class: IP- Inpatient   Admission Date: 5/14/2019  Length of Stay: 1 days  Attending Physician: Earl Menchaca MD  Primary Care Provider: Beatrice Wei, [facility] Medicine Team: [facility] [place] [place] VASCULAR STROKE NEUROLOGY Diomedes Jean    Subjective:     Principal Problem:Thrombotic stroke involving right middle cerebral artery    HPI: Mr Jamari Huerta is a 59 yo male with PMH of HTN, smoking, DVT (LLE), HLD, CKD, CAD, and PVD who was BIBA 5/14 at 2200 w/ c/o acute L sided weakness upper and lower extremety as well as dysarthria that began 40 min prior.   ED course: pt presented with  given cardene which brought sbp to 180. During CTA at 2250 pt reported sig improvement in symptoms. Able to ambulate. tPA not given due to improving symptoms.  CT head negative for acute bleed but showed R thalamic lacunar changes. Also showed bilat remote lacunar changes  CTA negative for LVO    DAPT started  Risk facto        Etiology likely small vessel disease      Antithrombotics for secondary stroke prevention: Antiplatelets: Aspirin: 325 mg daily  Clopidogrel: 75 mg daily (no plavix load; patient has hx of GI bleed while previously on plavix)      Statins for secondary stroke prevention and hyperlipidemia, if present:   Statins: Atorvastatin- 80 mg daily     Aggressive risk factor modification: HTN, Smoking, HLD     Rehab efforts: The patient has been evaluated by a stroke team provider and the therapy needs have been fully considered based off the presenting complaints and exam findings. The following therapy evaluations are needed:     PT not yet, OT evaluated reccommend outpatient rehab, SLP recommending a DYSPHAGIA SOFT DIET/THIN LIQUIDS at this time   Diagnostics ordered/pending: none      VTE prophylaxis: Heparin 5000 units SQ every 8 hours, SCDs     BP parameters: Infarct: No  intervention, SBP <220           Dysarthria and anarthria  -Stroke symptom.  SLP recommending mechanical soft diet, thin liquids. Will need outpatient SLP.      Compulsive tobacco user syndrome  -Stroke risk factor.  Patient states he is a current smoker and smokes ~ 1ppd.  -Encourage smoking cessation  -Nicotine patch prn     Essential hypertension  -Stroke risk factor.  SBP initially 200s.  Started on Cardene in the ED that eventually decreased patient's BP with subsequent improvement of his symptoms.  -SBP<220 for now  -Resume home meds when appropriate.     Mixed hyperlipidemia  -Stroke risk factor.   - LDL 83.6.  -Continue Atorvastatin 80 mg daily             STROKE DOCUMENTATION   Acute Stroke Times   Last Known Normal Date: 05/14/19  Last Known Normal Time: 2125  Symptom Onset Date: 05/14/19  Symptom Onset Time: 2125  Stroke Team Called Date: 05/14/19  Stroke Team Called Time: 2157  Stroke Team Arrival Date: 05/14/19  Stroke Team Arrival Time: 2203  CT Interpretation Time: 2207  Decision to Treat Time for Alteplase: 2215(delay in administration 2/2 SBP>185; Cardene started)          Hemorrhagic change of an Ischemic Stroke: Does this patient have an ischemic stroke with hemorrhagic changes? No      Neurologic Chief Complaint: left side weakness and dysarthria        Current Facility-Administered Medications:     aspirin tablet 325 mg, 325 mg, Oral, Daily, Claudette Velazquez MD, 325 mg at 05/16/19 0845    atorvastatin tablet 80 mg, 80 mg, Oral, Daily, Latasha Manzo DNP, NP, 80 mg at 05/16/19 0845    cloNIDine tablet 0.1 mg, 0.1 mg, Oral, BID, Mark Villar NP, 0.1 mg at 05/16/19 0846    clopidogrel tablet 75 mg, 75 mg, Oral, Daily, Mark Villar NP, 75 mg at 05/16/19 0846    docusate sodium capsule 100 mg, 100 mg, Oral, BID, Latasha Manzo DNP, NP, 100 mg at 05/16/19 0846    ferrous sulfate EC tablet 325 mg, 325 mg, Oral, Daily, Latasha Manzo DNP, NP, 325 mg at 05/16/19 0846    heparin  (porcine) injection 5,000 Units, 5,000 Units, Subcutaneous, Q8H, Mark Villar, LAWSON, 5,000 Units at 05/16/19 0556    labetalol 20 mg/4 mL (5 mg/mL) IV syring, 10 mg, Intravenous, Q6H PRN, Latasha Manzo DNP, NP    nicotine 21 mg/24 hr 1 patch, 1 patch, Transdermal, Daily, Latasha Manzo DNP, NP    ondansetron disintegrating tablet 8 mg, 8 mg, Oral, Q8H PRN, Latasha Manzo DNP, NP    ondansetron injection 4 mg, 4 mg, Intravenous, Q12H PRN, Latasha Manzo DNP, NP    pantoprazole EC tablet 40 mg, 40 mg, Oral, Daily, Latasha Manzo DNP, NP, 40 mg at 05/16/19 0845    sodium chloride 0.9% bolus 500 mL, 500 mL, Intravenous, Continuous PRN, Latasha Manzo DNP, NP    sodium chloride 0.9% flush 10 mL, 10 mL, Intravenous, PRN, Latasha Manzo DNP, NP, 10 mL at 05/15/19 2118      Neurological Exam:   LOC: alert  Attention Span: Good   Language: No aphasia  Articulation: Dysarthria  Orientation: Person, Place, Time   Visual Fields: Full  EOM (CN III, IV, VI): Full/intact  Facial Sensation (CN V): Normal  Facial Movement (CN VII): Lower facial weakness on the Left  Motor: Arm left  Paresis: 4/5  Leg left  Paresis: 4/5  Arm right  Normal 5/5  Leg right Normal 5/5  Sensation: Intact to light touch, temperature and vibration          Review of Systems   Constitutional: Negative for appetite change, chills and diaphoresis.   HENT: Negative for drooling and trouble swallowing.    Eyes: Negative for photophobia and visual disturbance.   Respiratory: Negative for cough, chest tightness and shortness of breath.    Cardiovascular: Negative for chest pain, palpitations and leg swelling.   Gastrointestinal: Negative for diarrhea, nausea and vomiting.   Genitourinary: Negative for difficulty urinating and dysuria.   Musculoskeletal: Positive for back pain. Negative for gait problem, joint swelling, neck pain and neck stiffness.   Neurological: Positive for speech difficulty and weakness. Negative for numbness.      Objective:     Vital Signs (Most Recent):  Temp: 98.1 °F (36.7 °C) (05/16/19 0429)  Pulse: 60 (05/16/19 0723)  Resp: 18 (05/16/19 0429)  BP: (!) 214/84 (05/16/19 0429)  SpO2: (!) 93 % (05/16/19 0429) Vital Signs (24h Range):  Temp:  [96.5 °F (35.8 °C)-98.1 °F (36.7 °C)] 98.1 °F (36.7 °C)  Pulse:  [55-72] 60  Resp:  [18] 18  SpO2:  [93 %-97 %] 93 %  BP: (130-214)/(81-93) 214/84     Weight: 101.7 kg (224 lb 3.3 oz)  Body mass index is 30.41 kg/m².    Intake/Output Summary (Last 24 hours) at 5/16/2019 0831  Last data filed at 5/15/2019 1126  Gross per 24 hour   Intake --   Output 500 ml   Net -500 ml      Physical Exam   Constitutional: He is oriented to person, place, and time. He appears well-developed and well-nourished. No distress.   HENT:   Head: Normocephalic and atraumatic.   Eyes: Pupils are equal, round, and reactive to light. EOM are normal. No scleral icterus.   Neck: Normal range of motion. Neck supple.   Cardiovascular: Normal rate.   No murmur heard.  Pulmonary/Chest: Effort normal and breath sounds normal. No respiratory distress.   Abdominal: Soft. There is no tenderness. There is no guarding.   Mass midline sub xiphisternal non pulsitile   Musculoskeletal: Normal range of motion. He exhibits no tenderness or deformity.   Neurological: He is alert and oriented to person, place, and time. He displays normal reflexes. No cranial nerve deficit or sensory deficit. He exhibits normal muscle tone. Coordination normal.   Skin: He is not diaphoretic.         Neurological Exam:   LOC: alert  Attention Span: Good   Language: No aphasia  Articulation: Dysarthria  Orientation: Person, Place, Time   Visual Fields: Full  EOM (CN III, IV, VI): Full/intact  Facial Sensation (CN V): Normal  Facial Movement (CN VII): Lower facial weakness on the Left  Motor: Arm left  Paresis: 4/5  Leg left  Paresis: 4/5  Arm right  Normal 5/5  Leg right Normal 5/5  Sensation: Intact to light touch, temperature and  vibration    Significant Labs:   A1C:   Recent Labs   Lab 05/15/19  0101   HGBA1C 5.5     CBC:   Recent Labs   Lab 05/14/19  2219 05/15/19  0440   WBC 9.73 9.68   HGB 14.8 14.7   HCT 46.8 46.3    222     CMP:   Recent Labs   Lab 05/14/19  2219 05/15/19  0440    137   K 4.3 4.3    104   CO2 23 24    106   BUN 24* 20   CREATININE 1.1 0.9   CALCIUM 9.2 9.1   PROT 7.0 7.1   ALBUMIN 3.1* 3.3*   BILITOT 0.3 0.2   ALKPHOS 83 84   AST 17 14   ALT 12 15   ANIONGAP 8 9   EGFRNONAA >60.0 >60.0     Coagulation:   Recent Labs   Lab 05/15/19  0440   INR 1.0   APTT 27.3       Significant Imaging: I have reviewed all pertinent imaging results/findings within the past 24 hours.    Assessment/Plan:      Active Diagnoses:    Diagnosis Date Noted POA    PRINCIPAL PROBLEM:  Thrombotic stroke involving right middle cerebral artery [I63.311] 05/14/2019 Yes    Dysarthria and anarthria [R47.1] 05/14/2019 Yes    Mixed hyperlipidemia [E78.2] 04/15/2014 Yes    Essential hypertension [I10] 04/15/2014 Yes    Compulsive tobacco user syndrome [F17.200] 04/15/2014 Yes      Problems Resolved During this Admission:     VTE Risk Mitigation (From admission, onward)        Ordered     heparin (porcine) injection 5,000 Units  Every 8 hours      05/15/19 1138     Place GENEVIEVE hose  Until discontinued      05/14/19 2347     IP VTE LOW RISK PATIENT  Once      05/14/19 2347     Place sequential compression device  Until discontinued      05/14/19 2347             Diomedes Jean  Department of Hospital Medicine   [facility]

## 2019-05-16 NOTE — PLAN OF CARE
05/16/19 1521   Post-Acute Status   Post-Acute Authorization Other   Other Status No Post-Acute Service Needs       No SW  Needs identified.       Rae Paredes   LMSW [facility]   [id number]

## 2019-05-16 NOTE — ASSESSMENT & PLAN NOTE
-Stroke risk factor.  Patient states he is a current smoker and smokes ~ 1ppd.  - Patient states he was able to quit w/o medical assistance for 1 year before restarting after his wife experienced illness of her own.  -Encourage smoking cessation  -Nicotine patch prn   98.7

## 2019-05-16 NOTE — DISCHARGE SUMMARY
"[facility]  Vascular Neurology  Comprehensive Stroke Center  Discharge Summary     Summary:     Admit Date: 5/14/2019 10:04 PM    Discharge Date and Time: 5/16/2019    Attending Physician: Earl Menchaca MD     Discharge Provider: Mark Villar NP    History of Present Illness: Patient is a 58 y.o. male with significant past medical history of HTN, smoking presented to hospital complaining of acute onset left sided weakness and dysarthria.  LKN ~2130.  The patient was in his usual state of health and had gone to [employer].  After leaving the store the patient noted LUE "heaviness".  He states he also noted slurred speech.  EMS was activated and noted patient's SBP>200.  A stroke code was activated and the patient was brought to the ED.  On arrival the patient is AA&O x3.  He denies HA, CP, N/V, change in vision.  Nothing exacerbated or alleviated his symptoms.  He states he took his BP meds this evening.  A stat CTH was obtained and is negative for acute findings.  Patient has no contraindications for tPA, however his SBP is >180.  Cardene was started.  A CTA Stroke MP was obtained and reviewed as images were acquired by Dr. Menchaca.  No LVO.  Just after completion of the CT the patient's SBP improved to 170s.  Upon return to the ED room the patient was reassessed.  No left sided drift, improved speech and facial droop.  Patient able to ambulate in the ED room with steady gait and without assistance.  He remains w/o complaint of HA or vision change but does endorse feeling light headed.  Will not receive tPA at this time due to rapidly improving symptoms.       MRI shows acute R thalamic infarct. Etiology small vessel disease. Patient put on DAPT and continuing home Lipitor. PT/OT/SLP recommend outpatient therapy. Patient educated on aggressive risk factor modification. Follow up in VN clinic 4-6 weeks.         Hospital Course (synopsis of major diagnoses, care, treatment, and services provided " during the course of the hospital stay): Prolonged stay for the following reasons:     5/15: Admitted overnight for acute LSW and dysarthria. SBP 200s in ED. CT head negative. No TPA given due to great improvement in symptoms. MRI R thalamic acute infarct. Echo complete. DAPT started (no load; previous hx of rectal bleeding while on plavix) and statin. OT reccommend outpatient rehab. PT eval pending.   5/16: No significant events overnight. LUE weakness improving. Restart home clonidine. Pending PT eval may discharge today.     Stroke Etiology: Evident Other/Uncommon Causes     STROKE DOCUMENTATION   Acute Stroke Times   Last Known Normal Date: 05/14/19  Last Known Normal Time: 2125  Symptom Onset Date: 05/14/19  Symptom Onset Time: 2125  Stroke Team Called Date: 05/14/19  Stroke Team Called Time: 2157  Stroke Team Arrival Date: 05/14/19  Stroke Team Arrival Time: 2203  CT Interpretation Time: 2207  Decision to Treat Time for Alteplase: 2215(delay in administration 2/2 SBP>185; Cardene started)     NIH Scale:  1a. Level of Consciousness: 0-->Alert, keenly responsive  1b. LOC Questions: 0-->Answers both questions correctly  1c. LOC Commands: 0-->Performs both tasks correctly  2. Best Gaze: 0-->Normal  3. Visual: 0-->No visual loss  4. Facial Palsy: 1-->Minor paralysis (flattened nasolabial fold, asymmetry on smiling)  5a. Motor Arm, Left: 1-->Drift, limb holds 90 (or 45) degrees, but drifts down before full 10 seconds, does not hit bed or other support(greatly improving)  5b. Motor Arm, Right: 0-->No drift, limb holds 90 (or 45) degrees for full 10 secs  6a. Motor Leg, Left: 1-->Drift, leg falls by the end of the 5-sec period but does not hit bed  6b. Motor Leg, Right: 0-->No drift, leg holds 30 degree position for full 5 secs  7. Limb Ataxia: 0-->Absent  8. Sensory: 0-->Normal, no sensory loss  9. Best Language: 0-->No aphasia, normal  10. Dysarthria: 1-->Mild-to-moderate dysarthria, patient slurs at least some  words and, at worst, can be understood with some difficulty  11. Extinction and Inattention (formerly Neglect): 0-->No abnormality  Total (NIH Stroke Scale): 4        Modified Pierre Score: 2  Hammond Coma Scale:    ABCD2 Score:    [id number] Score:   HAS -BLED Score:   ICH Score:   Hunt & Kim Classification:       Assessment/Plan:     Diagnostic Results:     Brain imaging:  CTH 5/14/19   1.  No CT evidence of acute intracranial abnormality.  If there is concern for acute ischemia, further evaluation could be performed with MRI.  2.  Patchy periventricular and supratentorial white matter hypoattenuation, nonspecific, although suggestive sequelae of chronic microvascular ischemic change.     MRI Brain 5/15/19       Small focus of minimal restricted diffusion in the anterior aspect of the right thalamus, likely a recent lacunar infarct.    Bilateral remote lacunar infarcts.    Senescent and chronic microvascular ischemic changes.     Vessel Imaging:  CTA Stroke MP 5/14/19   No CT findings of early ischemic changes.  MRI may be obtained for further evaluation.  Mild multifocal narrowing involving the remainder of the intracranial vessels without evidence of hemodynamically significant stenosis or large vessel occlusion.  Diffuse string like narrowing of the A1 segment of the left vertebral artery.  The chronicity of this is difficult to determine.  No hemodynamically significant stenosis of the neck vessels.  Incidental note made of dehiscent right jugular bulb.  Paraseptal emphysematous changes of bilateral lung apices.      Cardiac Evaluation:   TTE 5/15/19  · Mild left atrial enlargement.  · Low normal left ventricular systolic function. The estimated ejection fraction is 50%  · Eccentric left ventricular hypertrophy.  · Moderate left ventricular enlargement.  · Indeterminate left ventricular diastolic function.  · Mild right atrial enlargement.  · Normal right ventricular systolic function.  · Mild mitral  regurgitation.       Interventions: None    Complications: None    Disposition: Home or Self Care    Final Active Diagnoses:    Diagnosis Date Noted POA    PRINCIPAL PROBLEM:  Thrombotic stroke involving right middle cerebral artery [I63.311] 05/14/2019 Yes    Dysarthria and anarthria [R47.1] 05/14/2019 Yes    Mixed hyperlipidemia [E78.2] 04/15/2014 Yes    Essential hypertension [I10] 04/15/2014 Yes    Compulsive tobacco user syndrome [F17.200] 04/15/2014 Yes      Problems Resolved During this Admission:     * Thrombotic stroke involving right middle cerebral artery  58 y.o. male with significant past medical history of HTN, smoking presented to hospital complaining of acute onset left sided weakness and dysarthria that began ~40 min PTA in the ED.  Initial SBP 200s.  Cardene started.  CTH negative for acute findings.  SBP remained >180 on Cardene.  CTA negative for LVO. During CTA patient's SBP<180, symptoms greatly improved. Patient able to ambulate with steady gait and without assistance.  tPA not administered due to rapidly improving symptoms.      Etiology likely small vessel disease     Antithrombotics for secondary stroke prevention: Antiplatelets: Aspirin: 325 mg daily  Clopidogrel: 75 mg daily (no plavix load; patient has hx of GI bleed while previously on plavix)     Statins for secondary stroke prevention and hyperlipidemia, if present:   Statins: Atorvastatin- 80 mg daily    Aggressive risk factor modification: HTN, Smoking, HLD     Rehab efforts: The patient has been evaluated by a stroke team provider and the therapy needs have been fully considered based off the presenting complaints and exam findings. The following therapy evaluations are needed: PT evaluate and treat, OT evaluate and treat, SLP evaluate and treat     Diagnostics ordered/pending: none     VTE prophylaxis: Heparin 5000 units SQ every 8 hours, SCDs    BP parameters: Infarct: No intervention, SBP < 160        Dysarthria and  anarthria  -Stroke symptom.  SLP recommending mechanical soft diet, thin liquids. Will need outpatient SLP.   5/16- SLP states patient has improved. Upgraded to regular diet, thin liquids.     Compulsive tobacco user syndrome  -Stroke risk factor.  Patient states he is a current smoker and smokes ~ 1ppd.  - Patient states he was able to quit w/o medical assistance for 1 year before restarting after his wife experienced illness of her own.  -Encourage smoking cessation  -Nicotine patch prn    Essential hypertension  -Stroke risk factor.  SBP initially 200s.  Started on Cardene in the ED that eventually decreased patient's BP with subsequent improvement of his symptoms.  - Uncertain on patient's compliance. Patient states he takes BP meds daily as prescribed but wife states noncompliance. Educated patient on risk/benefits BP control.   -SBP< 160  - Clonidine restarted 5/17.  -Resume home meds at discharge. Follow up with PCP        Mixed hyperlipidemia  -Stroke risk factor.   - LDL 83.6.  -Continue Atorvastatin 80 mg daily        Recommendations:     Post-discharge complication risks: Falls    Stroke Education given to: patient    Follow-up in Stroke Clinic in 4-6 weeks.     Discharge Plan:  Antithrombotics: Aspirin 325 mg, Clopidogrel 75mg  Statin: Atorvastatin 80 mg  Smoking Cessation  Aggresive risk factor modification:  Hypertension  Smoking  High Cholesterol  Diet  Exercise    Follow Up:  Follow-up Information     [facility] [place].    Specialty:  Family Medicine  Why:  Outpatient Services. Hospital admission/follow up. Please see within 10 days of discharge.   Contact information:  [address] [facility] [address]  [phone]             [facility] VASCULAR NEUROLOG[facility].    Specialty:  Vascular Neurology  Why:  Someone will call to schedule an appointment within 4-6 weeks of discharge. If no one calls you within 1 week please call number listed below.   Contact information:  [id number]  [facility] [address]  [phone]                 Patient Instructions:      Activity as tolerated       Medications:  Reconciled Home Medications:      Medication List      START taking these medications    aspirin 325 MG tablet  Take 1 tablet (325 mg total) by mouth once daily.  Start taking on:  5/17/2019  Replaces:  aspirin 81 MG EC tablet     clopidogrel 75 mg tablet  Commonly known as:  PLAVIX  Take 1 tablet (75 mg total) by mouth once daily.  Start taking on:  5/17/2019        CONTINUE taking these medications    amLODIPine 10 MG tablet  Commonly known as:  NORVASC     atorvastatin 80 MG tablet  Commonly known as:  LIPITOR  Take 80 mg by mouth once daily.     cloNIDine 0.1 MG tablet  Commonly known as:  CATAPRES  Take 0.1 mg by mouth 2 (two) times daily.     docusate sodium 100 MG capsule  Commonly known as:  COLACE  Take 100 mg by mouth 2 (two) times daily.     ferrous sulfate 325 mg (65 mg iron) Tab tablet  Commonly known as:  FEOSOL  Take 325 mg by mouth daily with breakfast.     FLUoxetine 20 MG capsule  Take 20 mg by mouth once daily.     fosinopril-hydrochlorothiazide 20-12.5 mg per tablet  Commonly known as:  MONOPRIL-HCT  Take 1 tablet by mouth once daily.     pantoprazole 40 MG tablet  Commonly known as:  PROTONIX  Take 1 tablet (40 mg total) by mouth once daily.  Start taking on:  5/17/2019        STOP taking these medications    aspirin 81 MG EC tablet  Commonly known as:  ECOTRIN  Replaced by:  aspirin 325 MG tablet     diazePAM 10 MG Tab  Commonly known as:  VALIUM     fluticasone propionate 50 mcg/actuation nasal spray  Commonly known as:  FLONASE     lidocaine HCL 2% 2 % jelly  Commonly known as:  XYLOCAINE     metoprolol tartrate 50 MG tablet  Commonly known as:  LOPRESSOR     pramoxine 1 % Foam     rosuvastatin 20 MG tablet  Commonly known as:  BRENDAN Villar NP  [facility] Stroke [facility]  Department of Vascular Neurology   [facility]  [place]

## 2019-05-16 NOTE — NURSING
Patient ambulated to bathroom with assist x2. Very unsteady, used the wall/furniture for support. Educated the pt that he would have to use urinal and bedside commode. Pt stated he understood and would use call light for assistance to commode. Pt continues to attempt to get out of bed without calling for assistance. Bed alarm on, call light within reach, urinal at bedside. Will continue to monitor.

## 2019-05-16 NOTE — PT/OT/SLP EVAL
"Physical Therapy Evaluation and Discharge Note    Patient Name:  Jamari Huerta III   MRN:  [id number]    Recommendations:     Discharge Recommendations:  outpatient PT   Discharge Equipment Recommendations: none; usage of SPC upon discharge  Barriers to discharge: None    Assessment:     Jamari Huerta III is a 58 y.o. male admitted with a medical diagnosis of Thrombotic stroke involving right middle cerebral artery. .  At this time, patient is functioning at their prior level of function and does not require further acute PT services.     Recent Surgery: * No surgery found *      Plan:     During this hospitalization, patient does not require further acute PT services.  Please re-consult if situation changes.      Subjective     Chief Complaint: no complaints reported  Patient/Family Comments/goals: "Hopefully I can leave soon" per pt during session.  Pain/Comfort:  · Pain Rating 1: 0/10    Patients cultural, spiritual, Rastafarian conflicts given the current situation:      Living Environment:  Patient History:  · Home environment: lives with ex-wife, step dgt, and son in law on first floor of apartment with no NIVIA  · Assistance provided:  Ex-wife  · Bathroom set up:  Tub/shower    Previous Level of Mobilty  · Transfers: (I)  · Gait: MOD I with SPC usage for household and community distances    Additional Roles and Hx of Patient  · Working: disability  · Driving: yes  · Hobbies:did not state  · Hearing or Vision Deficit: none  · Hx of Falls: none    DME: SPC, RW  - Bold implies equipment being used    Objective:     Communicated with nsg prior to session.  Patient found supine with bed alarm, peripheral IV, telemetry upon PT entry to room.    General Precautions: Standard, fall, aspiration   Orthopedic Precautions:N/A   Braces: N/A         Exams:  Cognitive Exam  Patient is oriented to Person, Place, Time and Situation and follows 100% of multi-step commands    Fine Motor Coordination    -       Intact  RLE heel shin " and LLE heel shin   Postural Exam Patient presented with the following abnormalities:    -       No postural abnormalities identified   Sensation    -       Intact  light/touch (B) LE   Skin Integrity/Edema     -       Skin integrity: Visible skin intact   R LE ROM WFL   R LE Strength  WFL   L LE ROM WFL   L LE Strength  WFL       Functional Mobility  Bed Mobility  Supine to Sit: modified independence   Sit to Supine: modified independence   Transfers Sit to Stand:  modified independence with no AD for 1 trials     Gait Gait Distance: 200 ft without AD  Assistance Level: stand by assistance  Description: slowed lacy with 2 episodes of instability while in unilateral stance with L LE. Able to ambulate with improved stability with R UE usage of HR.          Balance   Static Sitting stand by assistance   Dynamic Sitting stand by assistance   Static Standing stand by assistance   Dynamic Standing contact guard assistance         AM-PAC 6 CLICK MOBILITY  Total Score:22       Therapeutic Activities and Exercises:    PT educated pt on the following  - role of PT  - PT POC (including frequency and duration while in hospital)  - discharge recommendation (OP PT) and equipment needs (SPC usage)  - level of assistance currently req (1 person )and safety precautions with ns staff   All questions and concerns answered and addressed. White board updated with pertinent information. Nsg notified.       AM-PAC 6 CLICK MOBILITY  Total Score:22     Patient left supine with all lines intact and call button in reach.    GOALS:   Multidisciplinary Problems     Physical Therapy Goals     Not on file          Multidisciplinary Problems (Resolved)        Problem: Physical Therapy Goal    Goal Priority Disciplines Outcome Goal Variances Interventions   Physical Therapy Goal   (Resolved)     PT, PT/OT Outcome(s) achieved                     History:     Past Medical History:   Diagnosis Date    Abnormal PFT     Cardiac murmur      Cardiomegaly     CKD (chronic kidney disease)     Coronary artery disease     Depression     DVT (deep venous thrombosis)     left lower leg    Dyspnea     Edema     Episode of transient neurologic symptoms 5/14/2019    Essential hypertension 4/15/2014    GI bleed     High cholesterol     Hypertension     Mixed hyperlipidemia 4/15/2014    PVD (peripheral vascular disease)     Thrombotic stroke involving right middle cerebral artery 5/14/2019       Past Surgical History:   Procedure Laterality Date    VASCULAR SURGERY      left lower leg       Time Tracking:     PT Received On: 05/16/19  PT Start Time: 0910     PT Stop Time: 0925  PT Total Time (min): 15 min     Billable Minutes: Evaluation 15      Giana St, PT  05/16/2019

## 2019-05-16 NOTE — PLAN OF CARE
Problem: Physical Therapy Goal  Goal: Physical Therapy Goal  Outcome: Outcome(s) achieved Date Met: 05/16/19  PT evaluation completed. No further acute PT services needed.     Giana St, PT  5/16/2019

## 2019-05-16 NOTE — PROGRESS NOTES
[facility]  Vascular Neurology  Comprehensive Stroke Center  Progress Note    Assessment/Plan:     * Thrombotic stroke involving right middle cerebral artery  58 y.o. male with significant past medical history of HTN, smoking presented to hospital complaining of acute onset left sided weakness and dysarthria that began ~40 min PTA in the ED.  Initial SBP 200s.  Cardene started.  CTH negative for acute findings.  SBP remained >180 on Cardene.  CTA negative for LVO. During CTA patient's SBP<180, symptoms greatly improved. Patient able to ambulate with steady gait and without assistance.  tPA not administered due to rapidly improving symptoms.      Etiology likely small vessel disease     Antithrombotics for secondary stroke prevention: Antiplatelets: Aspirin: 325 mg daily  Clopidogrel: 75 mg daily (no plavix load; patient has hx of GI bleed while previously on plavix)     Statins for secondary stroke prevention and hyperlipidemia, if present:   Statins: Atorvastatin- 80 mg daily    Aggressive risk factor modification: HTN, Smoking, HLD     Rehab efforts: The patient has been evaluated by a stroke team provider and the therapy needs have been fully considered based off the presenting complaints and exam findings. The following therapy evaluations are needed: PT evaluate and treat, OT evaluate and treat, SLP evaluate and treat     Diagnostics ordered/pending: none     VTE prophylaxis: Heparin 5000 units SQ every 8 hours, SCDs    BP parameters: Infarct: No intervention, SBP < 160        Dysarthria and anarthria  -Stroke symptom.  SLP recommending mechanical soft diet, thin liquids. Will need outpatient SLP.     Compulsive tobacco user syndrome  -Stroke risk factor.  Patient states he is a current smoker and smokes ~ 1ppd.  -Encourage smoking cessation  -Nicotine patch prn    Essential hypertension  -Stroke risk factor.  SBP initially 200s.  Started on Cardene in the ED that eventually decreased  patient's BP with subsequent improvement of his symptoms.  - Uncertain on patient's compliance. Patient states he takes BP meds daily as prescribed but wife states noncompliance. Educated patient on risk/benefits BP control.   -SBP< 160  -Resume home meds when appropriate.  - Clonidine restarted 5/17.     Mixed hyperlipidemia  -Stroke risk factor.   - LDL 83.6.  -Continue Atorvastatin 80 mg daily         5/15: Admitted overnight for acute LSW and dysarthria. SBP 200s in ED. CT head negative. No TPA given due to great improvement in symptoms. MRI R thalamic acute infarct. Echo complete. DAPT started (no load; previous hx of rectal bleeding while on plavix) and statin. OT reccommend outpatient rehab. PT eval pending.   5/16: No significant events overnight. LUE weakness improving. Restart home clonidine. Pending PT eval may discharge today.     STROKE DOCUMENTATION   Acute Stroke Times   Last Known Normal Date: 05/14/19  Last Known Normal Time: 2125  Symptom Onset Date: 05/14/19  Symptom Onset Time: 2125  Stroke Team Called Date: 05/14/19  Stroke Team Called Time: 2157  Stroke Team Arrival Date: 05/14/19  Stroke Team Arrival Time: 2203  CT Interpretation Time: 2207  Decision to Treat Time for Alteplase: 2215(delay in administration 2/2 SBP>185; Cardene started)    NIH Scale:  1a. Level of Consciousness: 0-->Alert, keenly responsive  1b. LOC Questions: 0-->Answers both questions correctly  1c. LOC Commands: 0-->Performs both tasks correctly  2. Best Gaze: 0-->Normal  3. Visual: 0-->No visual loss  4. Facial Palsy: 1-->Minor paralysis (flattened nasolabial fold, asymmetry on smiling)  5a. Motor Arm, Left: 1-->Drift, limb holds 90 (or 45) degrees, but drifts down before full 10 seconds, does not hit bed or other support(greatly improved 5/16)  5b. Motor Arm, Right: 0-->No drift, limb holds 90 (or 45) degrees for full 10 secs  6a. Motor Leg, Left: 1-->Drift, leg falls by the end of the 5-sec period but does not hit  bed  6b. Motor Leg, Right: 0-->No drift, leg holds 30 degree position for full 5 secs  7. Limb Ataxia: 0-->Absent  8. Sensory: 0-->Normal, no sensory loss  9. Best Language: 0-->No aphasia, normal  10. Dysarthria: 1-->Mild-to-moderate dysarthria, patient slurs at least some words and, at worst, can be understood with some difficulty  11. Extinction and Inattention (formerly Neglect): 0-->No abnormality  Total (NIH Stroke Scale): 4       Modified Pierre Score: 0  Mystic Coma Scale:    ABCD2 Score:    [id number] Score:   HAS -BLED Score:   ICH Score:   Hunt & Kim Classification:      Hemorrhagic change of an Ischemic Stroke: Does this patient have an ischemic stroke with hemorrhagic changes? No     Neurologic Chief Complaint: left side weakness and dysarthria     Subjective:     Interval History: Patient is seen for follow-up neurological assessment and treatment recommendations: No significant events overnight. LUE weakness improving. Restart home clonidine. Pending PT eval may discharge today.     HPI, Past Medical, Family, and Social History remains the same as documented in the initial encounter.     Review of Systems   Constitutional: Negative for chills and fever.   Eyes: Negative for visual disturbance.   Respiratory: Negative for shortness of breath.    Cardiovascular: Negative for chest pain.   Gastrointestinal: Negative for nausea and vomiting.   Neurological: Positive for facial asymmetry, speech difficulty (dysarthria improving but mild) and weakness. Negative for numbness and headaches.   Psychiatric/Behavioral: Negative for confusion.     Scheduled Meds:   aspirin  325 mg Oral Daily    atorvastatin  80 mg Oral Daily    cloNIDine  0.1 mg Oral BID    clopidogrel  75 mg Oral Daily    docusate sodium  100 mg Oral BID    ferrous sulfate  325 mg Oral Daily    heparin (porcine)  5,000 Units Subcutaneous Q8H    nicotine  1 patch Transdermal Daily    pantoprazole  40 mg Oral Daily     Continuous  Infusions:   sodium chloride 0.9%       PRN Meds:labetalol, ondansetron, ondansetron, sodium chloride 0.9%, sodium chloride 0.9%    Objective:     Vital Signs (Most Recent):  Temp: 98.2 °F (36.8 °C) (05/16/19 0725)  Pulse: 69 (05/16/19 0725)  Resp: 18 (05/16/19 0725)  BP: (!) 189/91 (05/16/19 0725)  SpO2: 96 % (05/16/19 0725)  BP Location: Left arm    Vital Signs Range (Last 24H):  Temp:  [96.5 °F (35.8 °C)-98.2 °F (36.8 °C)]   Pulse:  [55-72]   Resp:  [18]   BP: (130-214)/(81-93)   SpO2:  [93 %-96 %]   BP Location: Left arm    Physical Exam   Constitutional: He is oriented to person, place, and time. He appears well-developed.   HENT:   Head: Normocephalic.   Poor dental health   Eyes: Pupils are equal, round, and reactive to light.   Neck: Normal range of motion.   Cardiovascular: Normal rate.   Pulmonary/Chest: Effort normal.   Neurological: He is alert and oriented to person, place, and time. He exhibits abnormal muscle tone.   See below   Skin: Skin is warm and dry.   Psychiatric: His speech is slurred.   Nursing note and vitals reviewed.      Neurological Exam:   LOC: alert  Attention Span: Good   Language: No aphasia  Articulation: Dysarthria  Orientation: Person, Place, Time   Visual Fields: Full  EOM (CN III, IV, VI): Full/intact  Facial Sensation (CN V): Normal  Facial Movement (CN VII): Lower facial weakness on the Left  Motor: Arm left  Paresis: 4/5  Leg left  Paresis: 4/5  Arm right  Normal 5/5  Leg right Normal 5/5  Sensation: Intact to light touch, temperature and vibration    Laboratory:  CMP:   No results for input(s): GLUCOSE, CALCIUM, ALBUMIN, PROT, NA, K, CO2, CL, BUN, CREATININE, ALKPHOS, ALT, AST, BILITOT in the last 24 hours.  BMP:   Recent Labs   Lab 05/15/19  0440      K 4.3      CO2 24   BUN 20   CREATININE 0.9   CALCIUM 9.1     CBC:   Recent Labs   Lab 05/15/19  0440   WBC 9.68   RBC 5.15   HGB 14.7   HCT 46.3      MCV 90   MCH 28.5   MCHC 31.7*     Lipid Panel:    Recent Labs   Lab 05/14/19 2219   CHOL 134   LDLCALC 83.6   HDL 29*   TRIG 107     Hgb A1C:   Recent Labs   Lab 05/15/19  0101   HGBA1C 5.5     TSH:   Recent Labs   Lab 05/14/19 2219   TSH 1.292       Diagnostic Results      Brain imaging:  CTH 5/14/19   1.  No CT evidence of acute intracranial abnormality.  If there is concern for acute ischemia, further evaluation could be performed with MRI.  2.  Patchy periventricular and supratentorial white matter hypoattenuation, nonspecific, although suggestive sequelae of chronic microvascular ischemic change.     MRI Brain 5/15/19       Small focus of minimal restricted diffusion in the anterior aspect of the right thalamus, likely a recent lacunar infarct.    Bilateral remote lacunar infarcts.    Senescent and chronic microvascular ischemic changes.    Vessel Imaging:  CTA Stroke MP 5/14/19   No CT findings of early ischemic changes.  MRI may be obtained for further evaluation.  Mild multifocal narrowing involving the remainder of the intracranial vessels without evidence of hemodynamically significant stenosis or large vessel occlusion.  Diffuse string like narrowing of the A1 segment of the left vertebral artery.  The chronicity of this is difficult to determine.  No hemodynamically significant stenosis of the neck vessels.  Incidental note made of dehiscent right jugular bulb.  Paraseptal emphysematous changes of bilateral lung apices.      Cardiac Evaluation:   TTE 5/15/19  · Mild left atrial enlargement.  · Low normal left ventricular systolic function. The estimated ejection fraction is 50%  · Eccentric left ventricular hypertrophy.  · Moderate left ventricular enlargement.  · Indeterminate left ventricular diastolic function.  · Mild right atrial enlargement.  · Normal right ventricular systolic function.  · Mild mitral regurgitation.        Mark Villar NP  [facility]  Department of Vascular Neurology   [facility]

## 2019-05-16 NOTE — ASSESSMENT & PLAN NOTE
-Stroke symptom.  SLP recommending mechanical soft diet, thin liquids. Will need outpatient SLP.   5/16- SLP states patient has improved. Upgraded to regular diet, thin liquids.

## 2019-05-16 NOTE — PLAN OF CARE
PCP: NAHID Medellin  Pharmacy:   [id number] - [place] - [address]  [address] [address]  Phone: [phone] Fax: [phone]    [id number] - [place] - [address]  [address]  [place] [address]  Phone: [phone] Fax: [phone]         05/16/19 1000   Discharge Assessment   Assessment Type Discharge Planning Assessment   Confirmed/corrected address and phone number on facesheet? Yes   Assessment information obtained from? Patient   Expected Length of Stay (days)   (TBD)   Communicated expected length of stay with patient/caregiver yes   Prior to hospitilization cognitive status: Alert/Oriented;No Deficits   Prior to hospitalization functional status: Independent   Current cognitive status: Alert/Oriented   Current Functional Status: Independent   Lives With spouse   Able to Return to Prior Arrangements yes   Is patient able to care for self after discharge? Yes   Who are your caregiver(s) and their phone number(s)? Awa Fernandez Spouse     [phone]    Patient's perception of discharge disposition home or selfcare   Readmission Within the Last 30 Days no previous admission in last 30 days   Patient currently being followed by outpatient case management? No   Patient currently receives any other outside agency services? No   Equipment Currently Used at Home none   Do you have any problems affording any of your prescribed medications? No   Is the patient taking medications as prescribed? yes   Does the patient have transportation home? Yes   Transportation Anticipated family or friend will provide   Does the patient receive services at the [facility]? No   Discharge Plan A Home   Discharge Plan B Home with family   DME Needed Upon Discharge    (TBD)   Patient/Family in Agreement with Plan yes

## 2019-05-16 NOTE — PLAN OF CARE
Patient discharging home today with RX for outpatient therapy. Followup appts have been scheduled and AVS has been updated. No further d/c needs at this time.      05/16/19 1436   Final Note   Assessment Type Final Discharge Note   Anticipated Discharge Disposition Home   Right Care Referral Info   Post Acute Recommendation No Care

## 2019-05-16 NOTE — SUBJECTIVE & OBJECTIVE
Neurologic Chief Complaint: left side weakness and dysarthria     Subjective:     Interval History: Patient is seen for follow-up neurological assessment and treatment recommendations: No significant events overnight. LUE weakness improving. Restart home clonidine. Pending PT eval may discharge today.     HPI, Past Medical, Family, and Social History remains the same as documented in the initial encounter.     Review of Systems   Constitutional: Negative for chills and fever.   Eyes: Negative for visual disturbance.   Respiratory: Negative for shortness of breath.    Cardiovascular: Negative for chest pain.   Gastrointestinal: Negative for nausea and vomiting.   Neurological: Positive for facial asymmetry, speech difficulty (dysarthria improving but mild) and weakness. Negative for numbness and headaches.   Psychiatric/Behavioral: Negative for confusion.     Scheduled Meds:   aspirin  325 mg Oral Daily    atorvastatin  80 mg Oral Daily    cloNIDine  0.1 mg Oral BID    clopidogrel  75 mg Oral Daily    docusate sodium  100 mg Oral BID    ferrous sulfate  325 mg Oral Daily    heparin (porcine)  5,000 Units Subcutaneous Q8H    nicotine  1 patch Transdermal Daily    pantoprazole  40 mg Oral Daily     Continuous Infusions:   sodium chloride 0.9%       PRN Meds:labetalol, ondansetron, ondansetron, sodium chloride 0.9%, sodium chloride 0.9%    Objective:     Vital Signs (Most Recent):  Temp: 98.2 °F (36.8 °C) (05/16/19 0725)  Pulse: 69 (05/16/19 0725)  Resp: 18 (05/16/19 0725)  BP: (!) 189/91 (05/16/19 0725)  SpO2: 96 % (05/16/19 0725)  BP Location: Left arm    Vital Signs Range (Last 24H):  Temp:  [96.5 °F (35.8 °C)-98.2 °F (36.8 °C)]   Pulse:  [55-72]   Resp:  [18]   BP: (130-214)/(81-93)   SpO2:  [93 %-96 %]   BP Location: Left arm    Physical Exam   Constitutional: He is oriented to person, place, and time. He appears well-developed.   HENT:   Head: Normocephalic.   Poor dental health   Eyes: Pupils are equal,  round, and reactive to light.   Neck: Normal range of motion.   Cardiovascular: Normal rate.   Pulmonary/Chest: Effort normal.   Neurological: He is alert and oriented to person, place, and time. He exhibits abnormal muscle tone.   See below   Skin: Skin is warm and dry.   Psychiatric: His speech is slurred.   Nursing note and vitals reviewed.      Neurological Exam:   LOC: alert  Attention Span: Good   Language: No aphasia  Articulation: Dysarthria  Orientation: Person, Place, Time   Visual Fields: Full  EOM (CN III, IV, VI): Full/intact  Facial Sensation (CN V): Normal  Facial Movement (CN VII): Lower facial weakness on the Left  Motor: Arm left  Paresis: 4/5  Leg left  Paresis: 4/5  Arm right  Normal 5/5  Leg right Normal 5/5  Sensation: Intact to light touch, temperature and vibration    Laboratory:  CMP:   No results for input(s): GLUCOSE, CALCIUM, ALBUMIN, PROT, NA, K, CO2, CL, BUN, CREATININE, ALKPHOS, ALT, AST, BILITOT in the last 24 hours.  BMP:   Recent Labs   Lab 05/15/19  0440      K 4.3      CO2 24   BUN 20   CREATININE 0.9   CALCIUM 9.1     CBC:   Recent Labs   Lab 05/15/19  0440   WBC 9.68   RBC 5.15   HGB 14.7   HCT 46.3      MCV 90   MCH 28.5   MCHC 31.7*     Lipid Panel:   Recent Labs   Lab 05/14/19  2219   CHOL 134   LDLCALC 83.6   HDL 29*   TRIG 107     Hgb A1C:   Recent Labs   Lab 05/15/19  0101   HGBA1C 5.5     TSH:   Recent Labs   Lab 05/14/19  2219   TSH 1.292       Diagnostic Results      Brain imaging:  CTH 5/14/19   1.  No CT evidence of acute intracranial abnormality.  If there is concern for acute ischemia, further evaluation could be performed with MRI.  2.  Patchy periventricular and supratentorial white matter hypoattenuation, nonspecific, although suggestive sequelae of chronic microvascular ischemic change.     MRI Brain 5/15/19       Small focus of minimal restricted diffusion in the anterior aspect of the right thalamus, likely a recent lacunar  infarct.    Bilateral remote lacunar infarcts.    Senescent and chronic microvascular ischemic changes.    Vessel Imaging:  CTA Stroke MP 5/14/19   No CT findings of early ischemic changes.  MRI may be obtained for further evaluation.  Mild multifocal narrowing involving the remainder of the intracranial vessels without evidence of hemodynamically significant stenosis or large vessel occlusion.  Diffuse string like narrowing of the A1 segment of the left vertebral artery.  The chronicity of this is difficult to determine.  No hemodynamically significant stenosis of the neck vessels.  Incidental note made of dehiscent right jugular bulb.  Paraseptal emphysematous changes of bilateral lung apices.      Cardiac Evaluation:   TTE 5/15/19  · Mild left atrial enlargement.  · Low normal left ventricular systolic function. The estimated ejection fraction is 50%  · Eccentric left ventricular hypertrophy.  · Moderate left ventricular enlargement.  · Indeterminate left ventricular diastolic function.  · Mild right atrial enlargement.  · Normal right ventricular systolic function.  · Mild mitral regurgitation.

## 2019-05-16 NOTE — ASSESSMENT & PLAN NOTE
-Stroke risk factor.  SBP initially 200s.  Started on Cardene in the ED that eventually decreased patient's BP with subsequent improvement of his symptoms.  - Uncertain on patient's compliance. Patient states he takes BP meds daily as prescribed but wife states noncompliance. Educated patient on risk/benefits BP control.   -SBP< 160  -Resume home meds when appropriate.  - Clonidine restarted 5/17.

## 2019-05-16 NOTE — PLAN OF CARE
Problem: Adult Inpatient Plan of Care  Goal: Plan of Care Review  Outcome: Ongoing (interventions implemented as appropriate)  POC reviewed with patient. Verbalized understanding. Patient's neuro symptoms improving since last night. No drift on RUE, increased strength RUE & RLE, improved gait, and improved dysarthria noted. Bedside commode at bedside, but patient insists on ambulating to toilet to void. Assist x1. No complaints throughout shift. Stroke education provided. Stroke booklet at bedside. Hypertension education provided. Systolic blood pressure remains elevated but remains under parameters (216/86 most recent). Bed alarm activated and audible, non slip socks in place, call light within reach, bed in low position, emergency supplies at bedside. Will continue to monitor.

## 2019-05-16 NOTE — PLAN OF CARE
Problem: Occupational Therapy Goal  Goal: Occupational Therapy Goal  Goals set 5/15 to be addressed for 7 days with expiration date, 5/22:  Patient will increase functional independence with ADLs by performing:    Patient will demonstrate grooming while standing with CGA.   Not met  Patient will demonstrate upper body dressing with min assist while seated EOB.   Not met  Patient will demonstrate lower body dressing with min assist while seated EOB.   Not met  Patient will demonstrate toileting with CGA.   Not met  Patient will demonstrate bathing while seated EOB with min assist.   Not met  Patient will demonstrate independence with HEP for left UE weight bearing and AAROM.    Not met  Patient's family / caregiver will demonstrate independence and safety with assisting patient with self-care skills and functional mobility.     Not met  Patient's family / caregiver will demonstrate independence with providing ROM and changes in bed positioning.   Not met  Patient and/or patient's family will verbalize understanding of stroke prevention guidelines, personal risk factors and stroke warning signs via teachback method.  Not met          Goals revised 2* patient progressed.  MIAN Beard  5/16/2019

## 2019-05-16 NOTE — ASSESSMENT & PLAN NOTE
-Stroke risk factor.  SBP initially 200s.  Started on Cardene in the ED that eventually decreased patient's BP with subsequent improvement of his symptoms.  - Uncertain on patient's compliance. Patient states he takes BP meds daily as prescribed but wife states noncompliance. Educated patient on risk/benefits BP control.   -SBP< 160  - Clonidine restarted 5/17.  -Resume home meds at discharge. Follow up with PCP

## 2019-05-16 NOTE — PT/OT/SLP DISCHARGE
Occupational Therapy Discharge Summary    Jamari Huerta III  MRN: [id number]   Principal Problem: Thrombotic stroke involving right middle cerebral artery      Patient Discharged from acute Occupational Therapy on 5/16  Please refer to prior OT note dated 5/16 for functional status.    Assessment:      Patient appropriate for care in another setting.    Objective:     GOALS:   Multidisciplinary Problems     Occupational Therapy Goals     Not on file          Multidisciplinary Problems (Resolved)        Problem: Occupational Therapy Goal    Goal Priority Disciplines Outcome Interventions   Occupational Therapy Goal   (Resolved)     OT, PT/OT Outcome(s) achieved    Description:  Goals set 5/16 to be addressed for 7 days with expiration date, 5/23:  Patient will increase functional independence with ADLs by performing:    Patient will demonstrate grooming while standing with modified independence.   Not met  Patient will demonstrate upper body dressing with modified independence while seated EOB.   Not met  Patient will demonstrate lower body dressing with modified independence while seated EOB.   Not met  Patient will demonstrate toileting with modified independence.   Not met  Patient will demonstrate bathing while seated EOB with modified independence.   Not met  Patient will demonstrate independence with HEP for left UE weight bearing and AAROM.    Not met  Patient's family / caregiver will demonstrate independence and safety with assisting patient with self-care skills and functional mobility.     Not met  Patient's family / caregiver will demonstrate independence with providing ROM and changes in bed positioning.   Not met  Patient and/or patient's family will verbalize understanding of stroke prevention guidelines, personal risk factors and stroke warning signs via teachback method.  Not met                            Reasons for Discontinuation of Therapy Services  Transfer to alternate level of care.      Plan:      Patient Discharged to: Outpatient Therapy Services    MIAN Beard  5/16/2019

## 2019-05-16 NOTE — PROGRESS NOTES
05/16/2019      Jamari Huerta III  [address]  [place] [address]          Vascular Neurology Dept.  [facility] [address] [place] [address]  [phone] after hours  [phone] fax Diagnosis:  Thrombotic stroke involving right middle cerebral artery                         Debility    Please evaluate and treat patient for the following therapies: PT/OT/SLP        __________________________  Mark Villar NP  05/16/2019

## 2019-05-16 NOTE — ASSESSMENT & PLAN NOTE
58 y.o. male with significant past medical history of HTN, smoking presented to hospital complaining of acute onset left sided weakness and dysarthria that began ~40 min PTA in the ED.  Initial SBP 200s.  Cardene started.  CTH negative for acute findings.  SBP remained >180 on Cardene.  CTA negative for LVO. During CTA patient's SBP<180, symptoms greatly improved. Patient able to ambulate with steady gait and without assistance.  tPA not administered due to rapidly improving symptoms.      Etiology likely small vessel disease     Antithrombotics for secondary stroke prevention: Antiplatelets: Aspirin: 325 mg daily  Clopidogrel: 75 mg daily (no plavix load; patient has hx of GI bleed while previously on plavix)     Statins for secondary stroke prevention and hyperlipidemia, if present:   Statins: Atorvastatin- 80 mg daily    Aggressive risk factor modification: HTN, Smoking, HLD     Rehab efforts: The patient has been evaluated by a stroke team provider and the therapy needs have been fully considered based off the presenting complaints and exam findings. The following therapy evaluations are needed: PT evaluate and treat, OT evaluate and treat, SLP evaluate and treat     Diagnostics ordered/pending: none     VTE prophylaxis: Heparin 5000 units SQ every 8 hours, SCDs    BP parameters: Infarct: No intervention, SBP < 160

## 2019-05-16 NOTE — PT/OT/SLP PROGRESS
"Speech Language Pathology Treatment    Patient Name:  Jamari Huerta III   MRN:  [id number]  Admitting Diagnosis: Thrombotic stroke involving right middle cerebral artery    Recommendations:                 General Recommendations:  Speech/language therapy and cognitive/linguistic therapy  Diet recommendations:  Regular, Liquid Diet Level: Thin   Aspiration Precautions: Standard aspiration precautions   General Precautions: Standard, fall  Communication strategies:  none    Subjective     Patient awake and alert during session  Communicated with nurse prior to session     Pain/Comfort:  · Pain Rating 1: 0/10  · Pain Rating Post-Intervention 1: 0/10    Objective:     Has the patient been evaluated by SLP for swallowing?   Yes  Keep patient NPO? No   Current Respiratory Status: room air      Patient seen for ongoing swallow assessment and speech/language/cognitive therapy. He was sitting up no edge of bed with family in room when SLP entered. Patient and family reported improvement in all ST-related areas since admission. During PO trials, he tolerated thin liquids x7 (via straw) and solids x5 (alexis crackers) with no overt signs of aspiration. He did not exhibit any pocketing or oral residue and his swallow was timely with good laryngeal elevation. Patient did not exhibit any overt left-sided oral weakness on oral mech assessment.     During speech/language/cognitive eval, patient demonstrated improved cognition from yesterday, but he continued to demonstrate some impulsivity (difficult to determine how different this is from his baseline). Patient stated that he was ready to, "run out of here," despite falling last night on the way to the bathroom. When asked about this incident he stated that he felt much stronger today and that it would not happen again. He independently recalled 3/4 unrelated objects that SLP had asked him to remember from yesterday's eval and independently gave accurate directions to return to his " house which he was unable to do yesterday as well. Patient independently completed reading, writing and visuospatial tasks to good effect. 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 Following modeling, patient demonstrated these strategies to good effect with min assist. SLP educated patient and family regarding the role of ST following discharge and they verbalized understanding. No further questions at this time. Patient left in room with call light within reach and family present. Diet recs communicated to treatment team.     Assessment:     Jamari Huerta III is a 58 y.o. male with an SLP diagnosis of Dysarthria and Cognitive-Linguistic Impairment.     Goals:   Multidisciplinary Problems     SLP Goals     Not on file          Multidisciplinary Problems (Resolved)        Problem: SLP Goal    Goal Priority Disciplines Outcome   SLP Goal   (Resolved)     SLP Outcome(s) achieved   Description:  Speech-Language Pathology Goals   Goals to be met by 5/22/19  1. Patient will tolerate a DYSPHAGIA SOFT DIET/THIN LIQUIDS with no s/s of aspiration.  2. Patient will independently recall 4/4 unrelated objects with a 5-minute interval.  3. Patient will answer high-level problem solving tasks with 80% accuracy and min assist.   4. Patient will independently follow complex directions with 80% accuracy.  5. Patient will independently recall and utilize 3 clear speech strategies during session.  6. Patient will independently name 12 items in a category in 1 minute.  7. Patient will complete safety awareness tasks with 90% accuracy and min assist.  8. Patient will participate in ongoing assessment of reading/writing/visuospatial skills.                     Plan:     · Patient to be seen:  3 x/week   · Plan of Care expires:  06/14/19  · Plan of Care reviewed with:  patient, family   · SLP Follow-Up:  Yes       Discharge  recommendations:  outpatient speech therapy   Barriers to Discharge:  None    Time Tracking:     SLP Treatment Date:   05/16/19  Speech Start Time:  1327  Speech Stop Time:  1349     Speech Total Time (min):  22 min    Billable Minutes: Speech Therapy Individual 12 and Treatment Swallowing Dysfunction 10    SANTOS Helton-SLP  Speech-Language Pathology  Pager: [phone]  05/16/2019

## 2019-05-16 NOTE — PT/OT/SLP PROGRESS
"Occupational Therapy   Treatment    Name: Jamari Huerta III  MRN: [id number]  Admitting Diagnosis:  Thrombotic stroke involving right middle cerebral artery       Recommendations:     Discharge Recommendations: outpatient OT  Discharge Equipment Recommendations:  none  Barriers to discharge:  None    Assessment:     Jamari Huerta III is a 58 y.o. male with a medical diagnosis of Thrombotic stroke involving right middle cerebral artery.  He presents with performance deficits affecting function are weakness, impaired endurance, impaired self care skills, impaired functional mobilty, gait instability, impaired balance, decreased coordination, decreased upper extremity function, abnormal tone, impaired coordination, impaired fine motor.     Rehab Prognosis:  Good; patient would benefit from acute skilled OT services to address these deficits and reach maximum level of function.       Plan:     Patient to be seen 3 x/week to address the above listed problems via self-care/home management, therapeutic activities, therapeutic exercises, neuromuscular re-education, cognitive retraining  · Plan of Care Expires: 06/12/19  · Plan of Care Reviewed with: patient    Subjective   Patient:  "I always dragged my left leg when I walked, ever since that surgery I had."  "Look at how I can move my left arm now!"  "I hope to go home today."    Pain/Comfort:  · Pain Rating 1: 0/10  · Pain Rating Post-Intervention 1: 0/10    Objective:     Communicated with: Nurse prior to session.  Patient found supine with bed alarm, peripheral IV, telemetry upon OT entry to room.  Family not present.  General Precautions: Standard, aspiration, fall   Orthopedic Precautions:N/A   Braces: N/A     Occupational Performance:     Bed Mobility:    · Patient completed Rolling/Turning to Left with  modified independence  · Patient completed Rolling/Turning to Right with modified independence  · Patient completed Scooting/Bridging with modified " independence  · Patient completed Supine to Sit with modified independence  · Patient completed Sit to Supine with modified independence     Functional Mobility/Transfers:  · Patient completed Sit <> Stand Transfer with supervision  with  no assistive device   · Patient completed Bed <> Chair Transfer using Stand Pivot technique with supervision with no assistive device    Activities of Daily Living:  · Grooming: stand by assistance while standing  · Upper Body Dressing: stand by assistance while seated EOB  · Lower Body Dressing: stand by assistance while seated EOB  · Toileting: stand by assistance        [facility] 6 Click ADL: 18    Treatment & Education:  Patient education provided for stroke warning signs, prevention guidelines and personal risk factors.  Patient verbalizing understanding via teach back method.   Patient education provided on role of OT and need for outpt OT upon discharge.  Patient education provided on hemiplegic dressing technique, left UE weight bearing and positioning.  Patient alert and oriented x 3; able to follow 4/4 one step commands.  Patient attentive and interactive throughout the session.   P/AAROM performed left UE one set x 10 rep in all planes of motion with stretches provided at end range; assistance and facilitation provided for upward rotation of the scapula during shoulder flexion and abduction.  Addressed FMC; patient able to demonstrate tying shoelaces.  Family not present during the session.  Continued education, patient/ family training recommended. Patient's functional status and disposition recommendation discussed with stroke team in daily rounds.  White board updated in patient's room.  OT asked if there were any other questions; patient had no further questions.     Patient left supine with all lines intact, call button in reach and bed alarm onEducation:      GOALS:   Multidisciplinary Problems     Occupational Therapy Goals        Problem: Occupational Therapy Goal     Goal Priority Disciplines Outcome Interventions   Occupational Therapy Goal     OT, PT/OT     Description:  Goals set 5/16 to be addressed for 7 days with expiration date, 5/23:  Patient will increase functional independence with ADLs by performing:    Patient will demonstrate grooming while standing with modified independence.   Not met  Patient will demonstrate upper body dressing with modified independence while seated EOB.   Not met  Patient will demonstrate lower body dressing with modified independence while seated EOB.   Not met  Patient will demonstrate toileting with modified independence.   Not met  Patient will demonstrate bathing while seated EOB with modified independence.   Not met  Patient will demonstrate independence with HEP for left UE weight bearing and AAROM.    Not met  Patient's family / caregiver will demonstrate independence and safety with assisting patient with self-care skills and functional mobility.     Not met  Patient's family / caregiver will demonstrate independence with providing ROM and changes in bed positioning.   Not met  Patient and/or patient's family will verbalize understanding of stroke prevention guidelines, personal risk factors and stroke warning signs via teachback method.  Not met                            Time Tracking:     OT Date of Treatment: 05/16/19  OT Start Time: 0541  OT Stop Time: 0605  OT Total Time (min): 24 min    Billable Minutes:Self Care/Home Management 24    MIAN Beard  5/16/2019

## 2019-05-22 ENCOUNTER — PATIENT OUTREACH (OUTPATIENT)
Dept: ADMINISTRATIVE | Facility: HOSPITAL | Age: 59
End: 2019-05-22

## 2019-05-31 ENCOUNTER — TELEPHONE (OUTPATIENT)
Dept: NEUROLOGY | Facility: CLINIC | Age: 59
End: 2019-05-31

## 2019-05-31 NOTE — TELEPHONE ENCOUNTER
----- Message from Pat Delgado RN sent at 5/16/2019  2:16 PM CDT -----  Please schedule a neuro followup appt for 4-6 weeks. Patient was inpatient and seen by Dr Menchaca.  Please contact patient with date and time of appt.

## 2019-06-18 ENCOUNTER — TELEPHONE (OUTPATIENT)
Dept: NEUROLOGY | Facility: CLINIC | Age: 59
End: 2019-06-18

## 2019-06-18 NOTE — TELEPHONE ENCOUNTER
----- Message from April Keller sent at 6/17/2019 11:10 AM CDT -----  Contact: self @ [phone]  Pt is returning your call from 5-31-19.  Pls call with an appt.

## 2019-07-18 ENCOUNTER — OFFICE VISIT (OUTPATIENT)
Dept: NEUROLOGY | Facility: CLINIC | Age: 59
End: 2019-07-18
Payer: MEDICARE

## 2019-07-18 VITALS
HEIGHT: 73 IN | BODY MASS INDEX: 29.58 KG/M2 | DIASTOLIC BLOOD PRESSURE: 88 MMHG | HEART RATE: 94 BPM | SYSTOLIC BLOOD PRESSURE: 156 MMHG

## 2019-07-18 DIAGNOSIS — I10 ESSENTIAL HYPERTENSION: ICD-10-CM

## 2019-07-18 DIAGNOSIS — E78.2 MIXED HYPERLIPIDEMIA: ICD-10-CM

## 2019-07-18 DIAGNOSIS — F17.200 COMPULSIVE TOBACCO USER SYNDROME: ICD-10-CM

## 2019-07-18 DIAGNOSIS — Z86.73 HISTORY OF STROKE: Primary | ICD-10-CM

## 2019-07-18 PROBLEM — I63.311 THROMBOTIC STROKE INVOLVING RIGHT MIDDLE CEREBRAL ARTERY: Status: RESOLVED | Noted: 2019-05-14 | Resolved: 2019-07-18

## 2019-07-18 PROCEDURE — 99213 OFFICE O/P EST LOW 20 MIN: CPT | Mod: PBBFAC | Performed by: PSYCHIATRY & NEUROLOGY

## 2019-07-18 PROCEDURE — 99214 OFFICE O/P EST MOD 30 MIN: CPT | Mod: S$PBB,,, | Performed by: PSYCHIATRY & NEUROLOGY

## 2019-07-18 PROCEDURE — 99999 PR PBB SHADOW E&M-EST. PATIENT-LVL III: ICD-10-PCS | Mod: PBBFAC,,, | Performed by: PSYCHIATRY & NEUROLOGY

## 2019-07-18 PROCEDURE — 99214 PR OFFICE/OUTPT VISIT, EST, LEVL IV, 30-39 MIN: ICD-10-PCS | Mod: S$PBB,,, | Performed by: PSYCHIATRY & NEUROLOGY

## 2019-07-18 PROCEDURE — 99999 PR PBB SHADOW E&M-EST. PATIENT-LVL III: CPT | Mod: PBBFAC,,, | Performed by: PSYCHIATRY & NEUROLOGY

## 2019-07-18 RX ORDER — METOPROLOL TARTRATE 50 MG/1
1 TABLET ORAL
COMMUNITY
Start: 2013-06-24 | End: 2023-10-27

## 2019-07-18 RX ORDER — NAPROXEN SODIUM 220 MG/1
81 TABLET, FILM COATED ORAL DAILY
COMMUNITY
Start: 2019-07-18

## 2019-07-18 NOTE — PROGRESS NOTES
Vascular Neurology  Clinic Note    ___________________  ASSESSMENT & PLAN    Problem List Items Addressed This Visit        1 - High    History of stroke    Current Assessment & Plan     Etiology: Small Vessel Occlusion Evident  MP Absent -- CE Absent --  Major: small artery infarct in R internal capsule -- Other Absent   · Diagnostic Orders: none  · Secondary stroke prevention: Continue aspirin  · Continue current statin therapy    · Blood pressure goal < 130/80 mmHg - will address with PCP this long term goal to prevent future ischemic stroke and intracerebral hemorrhage d/t small artery disease  · Stroke Risk Factors Addressed: HTN , HLD, smoker  · Stroke education administered              Unprioritized    Mixed hyperlipidemia - Primary    Essential hypertension    Compulsive tobacco user syndrome          Reason For Visit (Chief Complaint): stroke    HPI: 58 y.o. right handed male with acute onset of left sided weakness and dysarthria . No alteplase d/t resolving symptoms.  The aforementioned symptoms have never happened prior to the event. There are no identified triggers or modifying factors. There have been no recurrent events. There are no other associated symptoms.    Patient states he is doing fine and has had no residual symptoms since discharge.     Brain Imaging:  MRI Brain 5/15/19       Small focus of minimal restricted diffusion in the anterior aspect of the right internal capsule; Bilateral remote lacunar infarcts.  Senescent and chronic microvascular ischemic changes.     Vessel Imaging:  CTA Stroke MP 19   Mild multifocal narrowing involving the remainder of the intracranial vessels without evidence of hemodynamically significant stenosis or large vessel occlusion.  Diffuse string like narrowing of the A1 segment of the left vertebral artery.  The chronicity of this is difficult to determine.  No hemodynamically significant stenosis of the neck vessels.  Incidental note made of  dehiscent right jugular bulb.  Paraseptal emphysematous changes of bilateral lung apices.      Cardiac Evaluation:   TTE 5/15/19  · Mild left atrial enlargement.  · Low normal left ventricular systolic function. The estimated ejection fraction is 50%  · Eccentric left ventricular hypertrophy.  · Moderate left ventricular enlargement.  · Indeterminate left ventricular diastolic function.  · Mild right atrial enlargement.  · Normal right ventricular systolic function.  · Mild mitral regurgitation.    Other:     Relevant Labwork:  Recent Labs   Lab 05/14/19  2219 05/15/19  0101   Hemoglobin A1C  --  5.5   LDL Cholesterol 83.6  --    HDL 29 L  --    Triglycerides 107  --    Cholesterol 134  --        I independently viewed the above imaging studies in addition to reviewing the report.  I reviewed the above labwork.    Review of Systems  Msk: negative for muscle pain  Skin: negative for pruritis  Neuro: negative for headache  All others negative    Past Medical History  Past Medical History:   Diagnosis Date    Abnormal PFT     Cardiac murmur     Cardiomegaly     CKD (chronic kidney disease)     Coronary artery disease     Depression     DVT (deep venous thrombosis)     left lower leg    Dyspnea     Edema     Episode of transient neurologic symptoms 5/14/2019    Essential hypertension 4/15/2014    GI bleed     High cholesterol     Hypertension     Mixed hyperlipidemia 4/15/2014    PVD (peripheral vascular disease)     Thrombotic stroke involving right middle cerebral artery 5/14/2019     Family History  No relevant history   Social History  Current Smoker       Medication List with Changes/Refills   Current Medications    AMLODIPINE (NORVASC) 10 MG TABLET        ASPIRIN 325 MG TABLET    Take 1 tablet (325 mg total) by mouth once daily.    ASPIRIN 81 MG CHEW    Take 81 mg by mouth once daily.    ATORVASTATIN (LIPITOR) 80 MG TABLET    Take 80 mg by mouth once daily.    CLONIDINE (CATAPRES) 0.1 MG TABLET     "Take 0.1 mg by mouth 2 (two) times daily.    CLOPIDOGREL (PLAVIX) 75 MG TABLET    Take 1 tablet (75 mg total) by mouth once daily.    DOCUSATE SODIUM (COLACE) 100 MG CAPSULE    Take 100 mg by mouth 2 (two) times daily.    FERROUS SULFATE 325 MG (65 MG IRON) TAB TABLET    Take 325 mg by mouth daily with breakfast.    FLUOXETINE (PROZAC) 20 MG CAPSULE    Take 20 mg by mouth once daily.    FOSINOPRIL-HYDROCHLOROTHIAZIDE (MONOPRIL-HCT) 20-12.5 MG PER TABLET    Take 1 tablet by mouth once daily.    METOPROLOL TARTRATE (LOPRESSOR) 50 MG TABLET    Take 1 tablet by mouth.    PANTOPRAZOLE (PROTONIX) 40 MG TABLET    Take 1 tablet (40 mg total) by mouth once daily.       EXAM  Vital Signs:Blood pressure (!) 156/88, pulse 94, height 6' 1" (1.854 m).  General: well appearing without discomfort   Mental Status:alert, oriented to person - place - age - month   Language: able to name, repeat, comprehend commands   Cranial Nerves: EOMI, PERRL, no facial asymmetry, tongue to midline, palate midline  Motor: 5/5 power in all extremities, normal tone  Sensory: intact light touch bilaterally, intact proprioception bilaterally  Coordination: no ataxia on finger-to-nose or heel-to-shin testing; no truncal ataxia  Gait & Stance: normal    NIH Stroke Scale:    Level of Consciousness: 0 - alert  LOC Questions: 0 - answers both correctly  LOC Commands: 0 - performs both correctly  Best Gaze: 0 - normal  Visual: 0 - no visual loss  Facial Palsy: 0 - normal  Motor Left Arm: 0 - no drift  Motor Right Arm: 0 - no drift  Motor Left Le - no drift  Motor Right Le - no drift  Limb Ataxia: 0 - absent  Sensory: 0 - normal  Best Language: 0 - no aphasia  Dysarthria: 0 - normal articulation  Extinction and Inattention: 0 - no neglect  NIH Stroke Scale Total: 0          MD Marianela  Vascular Neurology  Office [phone]  Fax [phone]    "

## 2019-07-18 NOTE — ASSESSMENT & PLAN NOTE
Etiology: Small Vessel Occlusion Evident  MP Absent -- CE Absent --  Major: small artery infarct in R internal capsule -- Other Absent   · Diagnostic Orders: none  · Secondary stroke prevention: Continue aspirin  · Continue current statin therapy    · Blood pressure goal < 130/80 mmHg - will address with PCP this long term goal to prevent future ischemic stroke and intracerebral hemorrhage d/t small artery disease  · Stroke Risk Factors Addressed: HTN , HLD, smoker  · Stroke education administered

## 2019-11-27 RX ORDER — PANTOPRAZOLE SODIUM 40 MG/1
TABLET, DELAYED RELEASE ORAL
Qty: 30 TABLET | Refills: 0 | OUTPATIENT
Start: 2019-11-27

## 2019-11-27 RX ORDER — CLOPIDOGREL BISULFATE 75 MG/1
TABLET ORAL
Qty: 30 TABLET | Refills: 0 | OUTPATIENT
Start: 2019-11-27

## 2023-10-27 PROBLEM — J44.9 CHRONIC OBSTRUCTIVE PULMONARY DISEASE: Status: ACTIVE | Noted: 2023-10-27

## 2023-10-27 PROBLEM — F32.1 MODERATE SINGLE CURRENT EPISODE OF MAJOR DEPRESSIVE DISORDER: Status: ACTIVE | Noted: 2023-10-27

## 2023-10-27 PROBLEM — E66.9 OBESITY (BMI 30-39.9): Status: ACTIVE | Noted: 2023-10-27

## 2023-10-27 PROBLEM — Q85.00 NEUROFIBROMATOSIS: Status: ACTIVE | Noted: 2023-10-27

## 2023-10-27 PROBLEM — I63.511 CEREBROVASCULAR ACCIDENT (CVA) DUE TO OCCLUSION OF RIGHT MIDDLE CEREBRAL ARTERY: Status: ACTIVE | Noted: 2023-07-09

## 2023-10-27 PROBLEM — J42 CHRONIC BRONCHITIS, UNSPECIFIED CHRONIC BRONCHITIS TYPE: Status: ACTIVE | Noted: 2023-10-27

## 2023-10-27 PROBLEM — D50.9 IRON DEFICIENCY ANEMIA: Status: ACTIVE | Noted: 2023-10-27

## 2023-10-27 PROBLEM — I69.354 HEMIPARESIS AFFECTING LEFT SIDE AS LATE EFFECT OF CEREBROVASCULAR ACCIDENT: Status: ACTIVE | Noted: 2023-10-27

## 2023-10-27 PROBLEM — I50.20 HFREF (HEART FAILURE WITH REDUCED EJECTION FRACTION): Status: ACTIVE | Noted: 2023-07-11

## 2023-10-27 PROBLEM — N18.30 STAGE 3 CHRONIC KIDNEY DISEASE, UNSPECIFIED WHETHER STAGE 3A OR 3B CKD: Status: ACTIVE | Noted: 2023-10-27

## 2023-10-27 PROBLEM — Z86.718 HISTORY OF DVT (DEEP VEIN THROMBOSIS): Status: ACTIVE | Noted: 2022-11-29

## 2023-11-01 ENCOUNTER — TELEPHONE (OUTPATIENT)
Dept: NEUROLOGY | Facility: CLINIC | Age: 63
End: 2023-11-01
Payer: MEDICARE

## 2023-11-01 NOTE — TELEPHONE ENCOUNTER
Called patient to confirm appointment. Spoke with patient and confirmed appointment date and time. Clinic location was given. Patient v/u.

## 2023-11-02 ENCOUNTER — TELEPHONE (OUTPATIENT)
Dept: NEUROLOGY | Facility: CLINIC | Age: 63
End: 2023-11-02
Payer: MEDICARE

## 2023-11-02 ENCOUNTER — OFFICE VISIT (OUTPATIENT)
Dept: NEUROLOGY | Facility: CLINIC | Age: 63
End: 2023-11-02
Payer: MEDICARE

## 2023-11-02 VITALS
SYSTOLIC BLOOD PRESSURE: 169 MMHG | WEIGHT: 243.63 LBS | BODY MASS INDEX: 34.88 KG/M2 | HEART RATE: 72 BPM | HEIGHT: 70 IN | DIASTOLIC BLOOD PRESSURE: 88 MMHG | RESPIRATION RATE: 19 BRPM

## 2023-11-02 DIAGNOSIS — R47.1 DYSARTHRIA AND ANARTHRIA: ICD-10-CM

## 2023-11-02 DIAGNOSIS — F39 MOOD DISORDER: ICD-10-CM

## 2023-11-02 DIAGNOSIS — I69.354 HEMIPARESIS AFFECTING LEFT SIDE AS LATE EFFECT OF CEREBROVASCULAR ACCIDENT: ICD-10-CM

## 2023-11-02 DIAGNOSIS — Z72.0 TOBACCO ABUSE: ICD-10-CM

## 2023-11-02 DIAGNOSIS — I63.511 CEREBROVASCULAR ACCIDENT (CVA) DUE TO OCCLUSION OF RIGHT MIDDLE CEREBRAL ARTERY: Primary | ICD-10-CM

## 2023-11-02 PROBLEM — I73.9 PVD (PERIPHERAL VASCULAR DISEASE): Status: ACTIVE | Noted: 2023-11-02

## 2023-11-02 PROCEDURE — 1159F PR MEDICATION LIST DOCUMENTED IN MEDICAL RECORD: ICD-10-PCS | Mod: CPTII,S$GLB,, | Performed by: NURSE PRACTITIONER

## 2023-11-02 PROCEDURE — 3077F PR MOST RECENT SYSTOLIC BLOOD PRESSURE >= 140 MM HG: ICD-10-PCS | Mod: CPTII,S$GLB,, | Performed by: NURSE PRACTITIONER

## 2023-11-02 PROCEDURE — 3044F PR MOST RECENT HEMOGLOBIN A1C LEVEL <7.0%: ICD-10-PCS | Mod: CPTII,S$GLB,, | Performed by: NURSE PRACTITIONER

## 2023-11-02 PROCEDURE — 3008F BODY MASS INDEX DOCD: CPT | Mod: CPTII,S$GLB,, | Performed by: NURSE PRACTITIONER

## 2023-11-02 PROCEDURE — 99205 OFFICE O/P NEW HI 60 MIN: CPT | Mod: S$GLB,,, | Performed by: NURSE PRACTITIONER

## 2023-11-02 PROCEDURE — 4010F ACE/ARB THERAPY RXD/TAKEN: CPT | Mod: CPTII,S$GLB,, | Performed by: NURSE PRACTITIONER

## 2023-11-02 PROCEDURE — 3008F PR BODY MASS INDEX (BMI) DOCUMENTED: ICD-10-PCS | Mod: CPTII,S$GLB,, | Performed by: NURSE PRACTITIONER

## 2023-11-02 PROCEDURE — 3079F PR MOST RECENT DIASTOLIC BLOOD PRESSURE 80-89 MM HG: ICD-10-PCS | Mod: CPTII,S$GLB,, | Performed by: NURSE PRACTITIONER

## 2023-11-02 PROCEDURE — 3077F SYST BP >= 140 MM HG: CPT | Mod: CPTII,S$GLB,, | Performed by: NURSE PRACTITIONER

## 2023-11-02 PROCEDURE — 1160F PR REVIEW ALL MEDS BY PRESCRIBER/CLIN PHARMACIST DOCUMENTED: ICD-10-PCS | Mod: CPTII,S$GLB,, | Performed by: NURSE PRACTITIONER

## 2023-11-02 PROCEDURE — 99999 PR PBB SHADOW E&M-EST. PATIENT-LVL V: ICD-10-PCS | Mod: PBBFAC,,, | Performed by: NURSE PRACTITIONER

## 2023-11-02 PROCEDURE — 4010F PR ACE/ARB THEARPY RXD/TAKEN: ICD-10-PCS | Mod: CPTII,S$GLB,, | Performed by: NURSE PRACTITIONER

## 2023-11-02 PROCEDURE — 99999 PR PBB SHADOW E&M-EST. PATIENT-LVL V: CPT | Mod: PBBFAC,,, | Performed by: NURSE PRACTITIONER

## 2023-11-02 PROCEDURE — 3079F DIAST BP 80-89 MM HG: CPT | Mod: CPTII,S$GLB,, | Performed by: NURSE PRACTITIONER

## 2023-11-02 PROCEDURE — 1160F RVW MEDS BY RX/DR IN RCRD: CPT | Mod: CPTII,S$GLB,, | Performed by: NURSE PRACTITIONER

## 2023-11-02 PROCEDURE — 99205 PR OFFICE/OUTPT VISIT, NEW, LEVL V, 60-74 MIN: ICD-10-PCS | Mod: S$GLB,,, | Performed by: NURSE PRACTITIONER

## 2023-11-02 PROCEDURE — 1159F MED LIST DOCD IN RCRD: CPT | Mod: CPTII,S$GLB,, | Performed by: NURSE PRACTITIONER

## 2023-11-02 PROCEDURE — 3044F HG A1C LEVEL LT 7.0%: CPT | Mod: CPTII,S$GLB,, | Performed by: NURSE PRACTITIONER

## 2023-11-02 NOTE — PROGRESS NOTES
"NEUROLOGY  Outpatient Consultation Visit     [facility]  [address], [place] [address]  [phone] (office) / [phone] (fax)    Patient Name:  Jamari Huerta III  :  1960  MR #:  [id number]  Acct #:  [id number]    Date of Neurology Consult: 2023  Name of Provider: YOLANDA Winters    Other Physicians:  Nataliya Anderson MD (Primary Care Physician); Nataliya Anderson MD (Referring)      Chief Complaint: Stroke      History of Present Illness (HPI):  Jamari Huerta III is a 63 y.o. male with a PMHX of cardiomegaly, CKD, CAD, Depression, DVT, Dyspnea, HTN, GI Bleed, heart attack, HLD, PVD, stroke    Patient presents today to establish care following a history of (small vessel) infarct in May 2019 and large right MCA in 2023. He is accompanied by his sister who helps supply information. Both patient and sister are questionable historians.   Patient states in July he "went out" and EMS was called. He was suspected to be suffering a stroke and was taken to [facility] in [place] for workup. Initial NIHSS on admit was 13. CTH was negative for hemorrhage. CTA reported with occlusion of the right M1 segment with a large acute/early subacute infarct and was taken for emergent thrombectomy and tPA on 2023. Etiology of stroke was cryptogenic. Follow up CT scans were reported stable. He did have ILR placed to assess for afib component.  He was admitted inpatient rehab for 2.5 weeks after his hospital stay and then had home health therapy for several weeks. He never did attend outpatient therapy due to transportation issues. He is now eager to start therapies as he states his left side weakness has worsened. He will also be establishing care with cardiologist this month. He reports an overall decline since not having therapies. His last fall was about 1 week ago, tripping over a speed bump. He is using a cane for assistance.   He is currently smoking a pack per day. He " "will be attending a smoking cessation program this month. He initially denied ETOH abuse or illicit drug use but did finally admit to a history of ETOH abuse 20 years.     Patient is a poor historian. There is question about medication compliance. As per EMR he was to be on aspirin daily after his stroke in 2019 and states he was taking it but in the same sentence he was unsure. His sister states they didn't talk for years and he was previously living in another town. She recently has started helping with his affairs. He is currently living by himself. He is having a home health nurse come by tomorrow to evaluate him.       D/C summary [facility] 7/2023  "62M w/ PMH of CAD, HTN, HLD, PVD, tobacco use, prior CVA w/ no deficits who presented to [facility] ED on 7/9/2023 after a fall. Patient was with his ex-wife when he sustained a syncopal episode, EMS arrived and he was found to have left-sided deficits. These deficits were acute onset, severe, left sided facial droop and flaccid paralysis of arm and leg. Patient was brought to ER as level 1 stroke. He was noted to have right-sided gaze, left-sided visual deficits, left facial pathology, and left hemiparesis with patient unable to overcome gravity. NIHSS at time of admission: 13. Initial CT head showed some early ischemic changes in the right hemisphere. CTA perfusion head/neck: occlusion of the right M1 segment with a large acute/early subacute infarct in the right MCA vascular territory with a moderate amount of tissue at risk/penumbra. CTA/P demonstrates right MCA occlusion with large core infarct but no mismatch ratio ischemic penumbra and therefore was taken for thrombectomy. Patient was given tPA and [facility] Neurology was consulted, who noted patient with Cryptogenic right MCA territory infarct. TTE with mildly depressed EF at 40 to 45%, mildly dilated LA, negative bubble study. Repeat CT head demonstrated moderate size hypodensity suggesting acute " "infarct within the right frontotemporal region and no evidence of hemorrhage. CT head (7/11) showed stable expected evolution of moderate sized right MCA territory infarct and no overt hemorrhagic conversion or midline shift. MRI redemonstrated large acute/early subacute right MCA vascular territory infarct. No evidence of hemorrhagic conversion. Cardiology was consulted, and patient underwent HALEY and Agitated saline bubble contrast study on 7/12 (Op note pending)."    D/C Summary 5/2019  "Patient is a 58 y.o. male with significant past medical history of HTN, smoking presented to hospital complaining of acute onset left sided weakness and dysarthria.  LKN ~2130.  The patient was in his usual state of health and had gone to [employer].  After leaving the store the patient noted LUE "heaviness".  He states he also noted slurred speech.  EMS was activated and noted patient's SBP>200.  A stroke code was activated and the patient was brought to the ED.  On arrival the patient is AA&O x3.  He denies HA, CP, N/V, change in vision.  Nothing exacerbated or alleviated his symptoms.  He states he took his BP meds this evening.  A stat CTH was obtained and is negative for acute findings.  Patient has no contraindications for tPA, however his SBP is >180.  Cardene was started.  A CTA Stroke MP was obtained and reviewed as images were acquired by Dr. Menchaca.  No LVO.  Just after completion of the CT the patient's SBP improved to 170s.  Upon return to the ED room the patient was reassessed.  No left sided drift, improved speech and facial droop.  Patient able to ambulate in the ED room with steady gait and without assistance.  He remains w/o complaint of HA or vision change but does endorse feeling light headed.  Will not receive tPA at this time due to rapidly improving symptoms.        MRI shows acute R thalamic infarct. Etiology small vessel disease. Patient put on DAPT and continuing home Lipitor. PT/OT/SLP recommend outpatient " "therapy. Patient educated on aggressive risk factor modification. Follow up in VN clinic 4-6 weeks.      Hospital Course (synopsis of major diagnoses, care, treatment, and services provided during the course of the hospital stay): Prolonged stay for the following reasons:      5/15: Admitted overnight for acute LSW and dysarthria. SBP 200s in ED. CT head negative. No TPA given due to great improvement in symptoms. MRI R thalamic acute infarct. Echo complete. DAPT started (no load; previous hx of rectal bleeding while on plavix) and statin. OT reccommend outpatient rehab. PT eval pending.   5/16: No significant events overnight. LUE weakness improving. Restart home clonidine. Pending PT eval may discharge today."        Past Medical, Surgical, Family & Social History:   Past Medical History:   Diagnosis Date    Abnormal PFT     Cardiac murmur     Cardiomegaly     CKD (chronic kidney disease)     Coronary artery disease     Depression     DVT (deep venous thrombosis)     left lower leg    Dyspnea     Edema     Episode of transient neurologic symptoms 05/14/2019    Essential hypertension 04/15/2014    GI bleed     Heart attack 06/01/2023    approxiamate date    High cholesterol     Hypertension     Mixed hyperlipidemia 04/15/2014    PVD (peripheral vascular disease)     Stroke 06/01/2023    aprroximate date    Thrombotic stroke involving right middle cerebral artery 05/14/2019     Past Surgical History:   Procedure Laterality Date    AORTIC VALVE SURGERY      1982 -    VASCULAR SURGERY      left lower leg     Family History   Problem Relation Age of Onset    Diabetes Mother      Alcohol use:  reports no history of alcohol use.   (Of note, 0.6 oz = 1 beer or 6 oz = 10 beers).  Tobacco use:  reports that he has been smoking cigarettes. He started smoking about 54 years ago. He has a 48.0 pack-year smoking history. He has never used smokeless tobacco.  Street drug use:  reports no history of drug use.  Allergies: " "Bupropion, Plavix [clopidogrel], Zyban [bupropion hcl (smoking deter)], and Morphine.    Home Medications:     Current Outpatient Medications:     aspirin 81 MG Chew, Take 81 mg by mouth once daily., Disp: , Rfl:     atorvastatin (LIPITOR) 80 MG tablet, Take 1 tablet (80 mg total) by mouth once daily., Disp: 90 tablet, Rfl: 3    carvediloL (COREG) 6.25 MG tablet, Take 6.25 mg by mouth 2 (two) times daily with meals., Disp: , Rfl:     divalproex (DEPAKOTE) 125 MG EC tablet, Take 125 mg by mouth 3 (three) times daily., Disp: , Rfl:     hydroCHLOROthiazide (HYDRODIURIL) 12.5 MG Tab, Take 12.5 mg by mouth once daily., Disp: , Rfl:     sacubitriL-valsartan (ENTRESTO) 24-26 mg per tablet, Take 1 tablet by mouth once daily., Disp: 90 tablet, Rfl: 3    clopidogrel (PLAVIX) 75 mg tablet, Take 1 tablet (75 mg total) by mouth once daily., Disp: 30 tablet, Rfl: 0    ibuprofen (ADVIL,MOTRIN) 200 MG tablet, Take 200 mg by mouth every 8 (eight) hours as needed. Indications: pain, Disp: , Rfl:     pantoprazole (PROTONIX) 40 MG tablet, Take 40 mg by mouth Daily. Indications: gastroesophageal reflux disease, Disp: , Rfl:     Physical Examination:  BP (!) 169/88 (BP Location: Right arm, Patient Position: Sitting, BP Method: Large (Automatic))   Pulse 72   Resp 19   Ht 5' 10" (1.778 m)   Wt 110.5 kg (243 lb 9.7 oz)   BMI 34.95 kg/m²     GENERAL:  General appearance: Well, non-toxic appearing.  No apparent distress.  Neck: supple.  .    MENTAL STATUS:  Alertness, attention span & concentration: decreased  Language: normal.  Orientation to self, place & time:  normal.  Memory, recent & remote: normal.  Fund of knowledge: normal.      SPEECH:  Mild dysarthria  Follows simple commands.      CRANIAL NERVES:  Cranial Nerves II-XII were examined.  II - Visual fields: normal.  III, IV, VI: PERRL, EOMI, No ptosis, No nystagmus.  V - Facial sensation: normal.  VII - Face symmetry & mobility: mild left facial droop  VIII - Hearing: " normal  IX, X - Palate: mobile & midline.  XI - Shoulder shrug: normal.  XII - Tongue protrusion: normal.        GROSS MOTOR:  Gait & station: hemiplegic gait; uses cane;left foot drag  Tone: spastic LUE  Abnormal movements: none.  Finger-nose: abnormal  Rapid alternating movements: normal.  Pronator drift: normal      MUSCLE STRENGTH:   Hand grasp:   - right:5/5   - left:1/5    RIGHT    LEFT   5 Neck Ext. 5   5 Neck Flex 5   5 Deltoids 2   5 Biceps 3   5 Triceps 3   5 Forearm.Pr. 2   5 Wrist Ext. 0   5 Wrist Flex 0   5 Finger Ext. 0   5 Finger Flex 0        5 Iliopsoas flex    4   5 Hip Abduct 3   5 Hip Adduct 3   5 Quads 1   5 Hams 2   5 Dorsiflex 5   5 Plantar Flex 5         REFLEXES:    RIGHT Reflex   LEFT   2+ Biceps 3   2+ Brachiorad. 3        2+ Patellar 2+     SENSORY:  Light touch: Normal throughout.           Diagnostic Data Reviewed: \  I have personally reviewed provider notes, labs and imaging made available to me today.     Imaging:  Outside records    MRI brain 7/2023:  Impression  Redemonstrated large acute/early subacute right MCA vascular territory infarct. No evidence of hemorrhagic conversion    CTA 7/2023:  Impression  Abrupt occlusion of the right M1 segment with a large acute/early subacute infarct in the right MCA vascular territory with a moderate amount of tissue at risk/penumbra. No evidence of hemorrhagic conversion.     IR Thrombolysis 7/2023:  Impression  Successful catheter directed thrombectomy of an M1 segment right middle cerebral artery thrombus            MRI BRAIN WITHOUT CONTRAST 5/2019    Narrative  EXAMINATION:  MRI BRAIN WITHOUT CONTRAST    CLINICAL HISTORY:  Stroke;    TECHNIQUE:  Multiplanar multisequence MR imaging of the brain was performed without contrast.    COMPARISON:  CT head 05/14/2019, CTA stroke multiphase 05/14/2019    FINDINGS:  Intracranial compartment:    Ventricles and sulci are normal in size for age without evidence of hydrocephalus.    No extra-axial  blood or fluid collections    Mild generalized cerebral volume loss with compensatory prominence of the ventricular system and sulci.  Small area of minimal restricted diffusion in the anterior aspect of the right thalamus may reflect a recent lacunar type infarct.  Old lacunar infarct in the left corona radiata and right basal ganglia.  Superimposed scattered small foci of T2/FLAIR hyperintense signal in a periventricular distribution of the supratentorial white matter suggestive for chronic microvascular ischemic change.    Normal vascular flow voids are preserved.    Skull/extracranial contents (limited evaluation): Bone marrow signal intensity is normal.    IMPRESSION:    Small focus of minimal restricted diffusion in the anterior aspect of the right thalamus, likely a recent lacunar infarct.    Bilateral remote lacunar infarcts.    Senescent and chronic microvascular ischemic changes.      CT Head Without Contrast 5/2019    Narrative  EXAMINATION:  CT HEAD WITHOUT CONTRAST    CLINICAL HISTORY:  Focal neuro deficit, new, fixed or worsening, <6 hours;    TECHNIQUE:  Low dose axial images were obtained through the head.  Coronal and sagittal reformations were also performed. Contrast was not administered.    COMPARISON:  None.    FINDINGS:  There is mild generalized cerebral volume loss.  There is patchy periventricular and supratentorial white matter hypoattenuation, particularly within the left corona radiata, suggestive of sequelae of chronic microvascular ischemic change.  No large region of abnormal parenchymal hypoattenuation identified.  There is no intracranial hemorrhage or midline shift.  No extra-axial fluid collections appreciated.  The basal cisterns are patent. The mastoid air cells and paranasal sinuses are clear of acute process. The visualized bones of the calvarium demonstrate no acute osseous abnormality.    IMPRESSION:    1.  No CT evidence of acute intracranial abnormality.  If there is  concern for acute ischemia, further evaluation could be performed with MRI.    2.  Patchy periventricular and supratentorial white matter hypoattenuation, nonspecific, although suggestive sequelae of chronic microvascular ischemic change.      CTA STROKE MULTI-PHASE 5/2019    Narrative  EXAMINATION:  CTA STROKE MULTI-PHASE    CLINICAL HISTORY:  Left-sided facial droop, left upper extremity with weakness, and left pronator drift.    TECHNIQUE:  CT angiogram was performed from the level of the nicole to the top of the head following the IV administration of 100mL of Omnipaque 350.   Sagittal and coronal reconstructions and maximum intensity projection reconstructions were performed. Arterial stenosis percentages are based on NASCET measurement criteria.  Two additional phases of immediate post-contrast CTA images were performed through the head alone.    COMPARISON:  CT head 05/14/2019.    FINDINGS:  Non-Vascular Structures of the Neck/Thoracic Inlet (limited evaluation): Paraseptal emphysematous changes involving bilateral lung apices.  Small hypoattenuating nodule within the right thyroid lobe which is likely of minimal clinical significance given lesion size and patient age.    Aorta: Normal 3 vessel arch.    Extracranial carotid circulation: Multifocal irregularity and mild narrowing suggestive for atherosclerotic change.  No hemodynamically significant stenosis, aneurysmal dilatation, or dissection.    Extracranial vertebral circulation: Multifocal irregularity and mild narrowing suggestive for atherosclerotic change.  The left vertebral artery dominant.   No hemodynamically significant stenosis, aneurysmal dilatation, or dissection.    Intracranial Arteries:    Multifocal irregularity and mild narrowing suggestive for atherosclerotic change.  There is a long segment string like narrowing of the left A1. There is a large calcified nonocclusive plaque involving the V4 segment of the left vertebral artery.  The  "remainder of the intracranial arteries are within normal limits without focal stenosis, aneurysmal dilatation, or major branch vessel occlusion.    Venous structures (limited evaluation): No evidence thrombosis or occlusion.  Incidental note made of a dehiscent right jugular bulb.    IMPRESSION:    No CT findings of early ischemic changes.  MRI may be obtained for further evaluation.    Mild multifocal narrowing involving the remainder of the intracranial vessels without evidence of hemodynamically significant stenosis or large vessel occlusion.    Diffuse string like narrowing of the A1 segment of the left vertebral artery.  The chronicity of this is difficult to determine.    No hemodynamically significant stenosis of the neck vessels.    Incidental note made of dehiscent right jugular bulb.    Paraseptal emphysematous changes of bilateral lung apices.      Cardiac:    Labs:  Lab Results   Component Value Date    WBC 8.35 11/03/2023    HGB 15.1 11/03/2023    HCT 45.7 11/03/2023     11/03/2023    MCV 91 11/03/2023    RDW 14.6 (H) 11/03/2023     Lab Results   Component Value Date     11/03/2023    K 4.5 11/03/2023     11/03/2023    CO2 23 11/03/2023    BUN 18 11/03/2023    CREATININE 0.71 11/03/2023     (H) 11/03/2023    CALCIUM 8.9 11/03/2023    MG 2.1 05/14/2019    PHOS 4.1 05/14/2019     Lab Results   Component Value Date    PROT 7.1 05/15/2019    ALBUMIN 3.3 (L) 08/01/2023    BILITOT 0.2 05/15/2019    AST 14 05/15/2019    ALKPHOS 84 05/15/2019    ALT 15 05/15/2019     Lab Results   Component Value Date    INR 1.1 07/09/2023     Lab Results   Component Value Date    CHOL 114 07/10/2023    HDL 31 (L) 07/10/2023    LDLCALC 70 07/10/2023    TRIG 64 07/10/2023    CHOLHDL 21.6 05/14/2019     Lab Results   Component Value Date    HGBA1C 5.8 07/09/2023      No results found for: "FSMLWHYR43"  No results found for: "FOLATE"  Lab Results   Component Value Date    TSH 0.976 07/09/2023 "               Assessment and Plan:  Jamari Huerat III is a 63 y.o. male.    Problem List Items Addressed This Visit          Neuro    Dysarthria and anarthria    Cerebrovascular accident (CVA) due to occlusion of right middle cerebral artery - Primary    Current Assessment & Plan     Patient is a 64 y/o male that presents to establish care after a recent right MCA stroke   - presented with left sided hemiplegia, right sided gaze preference; NIHSS 13 on admit  S/p thrombectomy and tPA  CTA with occlusion right M1 segment with large acute/subacute right MCA infarct  MRI brain scan re demonstrated large acute/subuacte right MCA infarct  F/u CTH without hemorrhage   TTE with depressed EF 40-45%; mildly dilated LA; neg for PFO  HALEY with EF 60-65%; neg for PFO  Etiology Cryptogenic   Pt now with ILR to assess for Afib component   - f/u cards     Cont ASA 81 mg QD   - med compliance questioned as pt admits to being out of this medication currently. Also question if he was previously taking this on a consistent basis. Pt is poor historian   Control vascular risk factors:   - BP management as per PCP   - LDL at goal   - A1c at goal   - h/o ETOH abuse   - diet modification    - stop smoking!!    Though pt did complete inpt rehab and HH therapies, he did not transition to outpt therapies. He continues to have left sided deficits which he believes has worsened   - encouraged some kind of therapy either it be HH or outpt   He lives alone and I am questioning med compliance.  nurse ordered to evaluate him this week. I have also encouraged med assistance from his sister  Referral placed to PM&R for possible botox to LUE         Hemiparesis affecting left side as late effect of cerebrovascular accident    Current Assessment & Plan     Referral placed to PM&R            Psychiatric    Mood disorder    Current Assessment & Plan     According to EMR, pt was started on Depakote at inpt rehab for increased agitation.   Referral placed  to Psych for med/mood management             Other    Tobacco abuse    Current Assessment & Plan     Vascular risk factor  Educated pt on smoking cessation  He plans on attending smoking cessation program this month                    Important to note, also  has a past medical history of Abnormal PFT, Cardiac murmur, Cardiomegaly, CKD (chronic kidney disease), Coronary artery disease, Depression, DVT (deep venous thrombosis), Dyspnea, Edema, Episode of transient neurologic symptoms (05/14/2019), Essential hypertension (04/15/2014), GI bleed, Heart attack (06/01/2023), High cholesterol, Hypertension, Mixed hyperlipidemia (04/15/2014), PVD (peripheral vascular disease), Stroke (06/01/2023), and Thrombotic stroke involving right middle cerebral artery (05/14/2019).            The patient will return to clinic in 1-2 months.        All questions were answered and patient is comfortable with the plan.       Thank you very much for the opportunity to assist in this patient's care.    If you have any questions or concerns, please do not hesitate to contact me at any time.    Sincerely,     YOLANDA Winters  [facility] [place]         I spent a total of 71 minutes on the day of the visit.This includes face to face time and non-face to face time preparing to see the patient (eg, review of tests), Obtaining and/or reviewing separately obtained history, Documenting clinical information in the electronic or other health record, Independently interpreting resultsand communicating results to the patient/family/caregiver, or Care coordination.

## 2023-11-03 PROBLEM — Z72.0 TOBACCO ABUSE: Status: ACTIVE | Noted: 2023-11-03

## 2023-11-03 NOTE — ASSESSMENT & PLAN NOTE
Vascular risk factor  Educated pt on smoking cessation  He plans on attending smoking cessation program this month

## 2023-11-06 PROBLEM — F39 MOOD DISORDER: Status: ACTIVE | Noted: 2023-11-06

## 2023-11-06 NOTE — ASSESSMENT & PLAN NOTE
According to EMR, pt was started on Depakote at in rehab for increased agitation.   Referral placed to Psych for med/mood management

## 2023-11-06 NOTE — ASSESSMENT & PLAN NOTE
Patient is a 64 y/o male that presents to [facility] care after a recent right MCA stroke   - presented with left sided hemiplegia, right sided gaze preference; NIHSS 13 on admit  S/p thrombectomy and tPA  CTA with occlusion right M1 segment with large acute/subacute right MCA infarct  MRI brain scan re demonstrated large acute/subuacte right MCA infarct  F/u CTH without hemorrhage   TTE with depressed EF 40-45%; mildly dilated LA; neg for PFO  HALEY with EF 60-65%; neg for PFO  Etiology Cryptogenic   Pt now with ILR to assess for Afib component   - f/u cards     Cont ASA 81 mg QD   - med compliance questioned as pt admits to being out of this medication currently. Also question if he was previously taking this on a consistent basis. Pt is poor historian   Control vascular risk factors:   - BP management as per PCP   - LDL at goal   - A1c at goal   - h/o ETOH abuse   - diet modification    - stop smoking!!    Though pt did complete inpt rehab and HH therapies, he did not transition to outpt therapies. He continues to have left sided deficits which he believes has worsened   - encouraged some kind of therapy either it be HH or outpt   He lives alone and I am questioning med compliance.  nurse ordered to evaluate him this week. I have also encouraged med assistance from his sister  Referral placed to PM&R for possible botox to ANANDA

## 2023-11-28 PROBLEM — R73.03 PREDIABETES: Status: ACTIVE | Noted: 2023-11-28

## 2023-12-04 NOTE — ASSESSMENT & PLAN NOTE
-Stroke risk factor.  Patient states he is a current smoker and smokes ~ 1ppd.  -Encourage smoking cessation  -Nicotine patch prn   Physical Therapy  Facility/Department: [address] MED SURG  Physical Therapy Daily Note  If patient discharges prior to next session this note will serve as a discharge summary. Please see below for the latest assessment towards goals. Name: Chuckie Mcgarry  :   MRN: [id number]  Date of Service: 2023    Discharge Recommendations:  Patient would benefit from continued therapy after discharge (3-5)   Chuckie Mcgarry scored a 16/24 on the AM-PAC short mobility form. Current research shows that an AM-PAC score of 17 or less is typically not associated with a discharge to the patient's home setting. Based on the patient's AM-PAC score and their current functional mobility deficits, it is recommended that the patient have 3-5 sessions per week of Physical Therapy at d/c to increase the patient's independence. Please see assessment section for further patient specific details. PT Equipment Recommendations  Other: will monitor      Patient Diagnosis(es): The primary encounter diagnosis was Necrotic toes ([address]). Diagnoses of ESRD (end stage renal disease) on dialysis ([address]) and Toe gangrene ([address]) were also pertinent to this visit. Past Medical History:  has a past medical history of Atrial fibrillation ([address]), CAD (coronary artery disease), Chronic kidney disease, Depression, Diabetes mellitus ([address]), Dialysis patient ([address]), Glaucoma, Hemodialysis patient ([address]), Hyperlipidemia, Hypertension, RLL pneumonia, and Sleep apnea. Past Surgical History:  has a past surgical history that includes Pacemaker insertion; pacemaker placement; Colonoscopy; and Toe amputation (Left, 2023). Assessment   Assessment: Today, the pt is demonstrating that he is functioning well below his reproted baseline of being indep in ambulation w/o a device. Today, the pt is limited by his cognitive deficts, his decreased safety awareness, need for tele-sitter and pain.  The pt is now requiring up to mod A for sit <> stand transfers

## 2023-12-19 ENCOUNTER — OFFICE VISIT (OUTPATIENT)
Dept: NEUROLOGY | Facility: CLINIC | Age: 63
End: 2023-12-19
Payer: MEDICARE

## 2023-12-19 VITALS
WEIGHT: 223.44 LBS | DIASTOLIC BLOOD PRESSURE: 86 MMHG | BODY MASS INDEX: 31.99 KG/M2 | HEIGHT: 70 IN | SYSTOLIC BLOOD PRESSURE: 150 MMHG | HEART RATE: 74 BPM | RESPIRATION RATE: 18 BRPM

## 2023-12-19 DIAGNOSIS — I69.354 HEMIPARESIS AFFECTING LEFT SIDE AS LATE EFFECT OF CEREBROVASCULAR ACCIDENT: ICD-10-CM

## 2023-12-19 DIAGNOSIS — F39 MOOD DISORDER: ICD-10-CM

## 2023-12-19 DIAGNOSIS — I63.511 CEREBROVASCULAR ACCIDENT (CVA) DUE TO OCCLUSION OF RIGHT MIDDLE CEREBRAL ARTERY: Primary | ICD-10-CM

## 2023-12-19 DIAGNOSIS — Z72.0 TOBACCO ABUSE: ICD-10-CM

## 2023-12-19 PROCEDURE — 4010F ACE/ARB THERAPY RXD/TAKEN: CPT | Mod: CPTII,S$GLB,, | Performed by: NURSE PRACTITIONER

## 2023-12-19 PROCEDURE — 1159F PR MEDICATION LIST DOCUMENTED IN MEDICAL RECORD: ICD-10-PCS | Mod: CPTII,S$GLB,, | Performed by: NURSE PRACTITIONER

## 2023-12-19 PROCEDURE — 3077F SYST BP >= 140 MM HG: CPT | Mod: CPTII,S$GLB,, | Performed by: NURSE PRACTITIONER

## 2023-12-19 PROCEDURE — 1160F PR REVIEW ALL MEDS BY PRESCRIBER/CLIN PHARMACIST DOCUMENTED: ICD-10-PCS | Mod: CPTII,S$GLB,, | Performed by: NURSE PRACTITIONER

## 2023-12-19 PROCEDURE — 1160F RVW MEDS BY RX/DR IN RCRD: CPT | Mod: CPTII,S$GLB,, | Performed by: NURSE PRACTITIONER

## 2023-12-19 PROCEDURE — 3008F BODY MASS INDEX DOCD: CPT | Mod: CPTII,S$GLB,, | Performed by: NURSE PRACTITIONER

## 2023-12-19 PROCEDURE — 3044F HG A1C LEVEL LT 7.0%: CPT | Mod: CPTII,S$GLB,, | Performed by: NURSE PRACTITIONER

## 2023-12-19 PROCEDURE — 1159F MED LIST DOCD IN RCRD: CPT | Mod: CPTII,S$GLB,, | Performed by: NURSE PRACTITIONER

## 2023-12-19 PROCEDURE — 3008F PR BODY MASS INDEX (BMI) DOCUMENTED: ICD-10-PCS | Mod: CPTII,S$GLB,, | Performed by: NURSE PRACTITIONER

## 2023-12-19 PROCEDURE — 3044F PR MOST RECENT HEMOGLOBIN A1C LEVEL <7.0%: ICD-10-PCS | Mod: CPTII,S$GLB,, | Performed by: NURSE PRACTITIONER

## 2023-12-19 PROCEDURE — 3077F PR MOST RECENT SYSTOLIC BLOOD PRESSURE >= 140 MM HG: ICD-10-PCS | Mod: CPTII,S$GLB,, | Performed by: NURSE PRACTITIONER

## 2023-12-19 PROCEDURE — 99999 PR PBB SHADOW E&M-EST. PATIENT-LVL IV: ICD-10-PCS | Mod: PBBFAC,,, | Performed by: NURSE PRACTITIONER

## 2023-12-19 PROCEDURE — 4010F PR ACE/ARB THEARPY RXD/TAKEN: ICD-10-PCS | Mod: CPTII,S$GLB,, | Performed by: NURSE PRACTITIONER

## 2023-12-19 PROCEDURE — 99215 PR OFFICE/OUTPT VISIT, EST, LEVL V, 40-54 MIN: ICD-10-PCS | Mod: S$GLB,,, | Performed by: NURSE PRACTITIONER

## 2023-12-19 PROCEDURE — 3079F DIAST BP 80-89 MM HG: CPT | Mod: CPTII,S$GLB,, | Performed by: NURSE PRACTITIONER

## 2023-12-19 PROCEDURE — 99999 PR PBB SHADOW E&M-EST. PATIENT-LVL IV: CPT | Mod: PBBFAC,,, | Performed by: NURSE PRACTITIONER

## 2023-12-19 PROCEDURE — 99215 OFFICE O/P EST HI 40 MIN: CPT | Mod: S$GLB,,, | Performed by: NURSE PRACTITIONER

## 2023-12-19 PROCEDURE — 3079F PR MOST RECENT DIASTOLIC BLOOD PRESSURE 80-89 MM HG: ICD-10-PCS | Mod: CPTII,S$GLB,, | Performed by: NURSE PRACTITIONER

## 2023-12-19 NOTE — ASSESSMENT & PLAN NOTE
Patient is a 64 y/o male that presents for right MCA stroke f/u   - presented to hosp with left sided hemiplegia, right sided gaze preference; NIHSS 13 on admit  S/p thrombectomy and tPA  CTA with occlusion right M1 segment with large acute/subacute right MCA infarct  MRI brain scan re demonstrated large acute/subuacte right MCA infarct  F/u CTH without hemorrhage   TTE with depressed EF 40-45%; mildly dilated LA; neg for PFO  HALEY with EF 60-65%; neg for PFO  Etiology Cryptogenic   Pt now with ILR to assess for Afib component   - no afib on recent interrogation      Cont ASA 81 mg QD  Control vascular risk factors:   - BP management as per PCP   - LDL at goal   - A1c at goal   - h/o ETOH abuse   - diet modification    - stop smoking!!     Though pt did complete inpt rehab and HH therapies, he did not transition to outpt therapies. He is now participating in outpt PT/OT but not consistent with home exercises.     He lives alone and I am questioning med compliance.  he refuses sisters help and has told her he was taking all of his meds TID. Pt is poor historian. After lengthy discussion he has agreed to let her help him

## 2023-12-19 NOTE — PROGRESS NOTES
"  NEUROLOGY  Outpatient Follow Up    [facility]  [address] [facility] [address], [place] [address]  [phone] (office) / [phone] (fax)    Patient Name:  Jamari Huerta III  :  1960  MR #:  [id number]  Acct #:  [id number]    Date of Neurology Visit: 2023  Name of Provider: YOLANDA Winters    Other Physicians:  Nataliya Anderson MD (Primary Care Physician); No ref. provider found (Referring)      Chief Complaint: Stroke      History of Present Illness (HPI):  Jamari Huerta III is a 63 y.o. male with a PMHX of cardiomegaly, CKD, CAD, Depression, DVT, Dyspnea, HTN, GI Bleed, heart attack, HLD, PVD, stroke     Patient presents today to establish care following a history of (small vessel) infarct in May 2019 and large right MCA in 2023. He is accompanied by his sister who helps supply information. Both patient and sister are questionable historians.   Patient states in July he "went out" and EMS was called. He was suspected to be suffering a stroke and was taken to [facility] in [place] for workup. Initial NIHSS on admit was 13. CTH was negative for hemorrhage. CTA reported with occlusion of the right M1 segment with a large acute/early subacute infarct and was taken for emergent thrombectomy and tPA on 2023. Etiology of stroke was cryptogenic. Follow up CT scans were reported stable. He did have ILR placed to assess for afib component.  He was admitted inpatient rehab for 2.5 weeks after his hospital stay and then had home health therapy for several weeks. He never did attend outpatient therapy due to transportation issues. He is now eager to start therapies as he states his left side weakness has worsened. He will also be establishing care with cardiologist this month. He reports an overall decline since not having therapies. His last fall was about 1 week ago, tripping over a speed bump. He is using a cane for assistance.   He is currently smoking a pack per day. He will be " "attending a smoking cessation program this month. He initially denied ETOH abuse or illicit drug use but did finally admit to a history of ETOH abuse 20 years.      Patient is a poor historian. There is question about medication compliance. As per EMR he was to be on aspirin daily after his stroke in 2019 and states he was taking it but in the same sentence he was unsure. His sister states they didn't talk for years and he was previously living in another town. She recently has started helping with his affairs. He is currently living by himself. He is having a home health nurse come by tomorrow to evaluate him.         D/C summary [facility] 7/2023  "62M w/ PMH of CAD, HTN, HLD, PVD, tobacco use, prior CVA w/ no deficits who presented to [facility] ED on 7/9/2023 after a fall. Patient was with his ex-wife when he sustained a syncopal episode, EMS arrived and he was found to have left-sided deficits. These deficits were acute onset, severe, left sided facial droop and flaccid paralysis of arm and leg. Patient was brought to ER as level 1 stroke. He was noted to have right-sided gaze, left-sided visual deficits, left facial pathology, and left hemiparesis with patient unable to overcome gravity. NIHSS at time of admission: 13. Initial CT head showed some early ischemic changes in the right hemisphere. CTA perfusion head/neck: occlusion of the right M1 segment with a large acute/early subacute infarct in the right MCA vascular territory with a moderate amount of tissue at risk/penumbra. CTA/P demonstrates right MCA occlusion with large core infarct but no mismatch ratio ischemic penumbra and therefore was taken for thrombectomy. Patient was given tPA and [facility] Neurology was consulted, who noted patient with Cryptogenic right MCA territory infarct. TTE with mildly depressed EF at 40 to 45%, mildly dilated LA, negative bubble study. Repeat CT head demonstrated moderate size hypodensity suggesting acute infarct " "within the right frontotemporal region and no evidence of hemorrhage. CT head (7/11) showed stable expected evolution of moderate sized right MCA territory infarct and no overt hemorrhagic conversion or midline shift. MRI redemonstrated large acute/early subacute right MCA vascular territory infarct. No evidence of hemorrhagic conversion. Cardiology was consulted, and patient underwent HALEY and Agitated saline bubble contrast study on 7/12 (Op note pending)."     D/C Summary 5/2019  "Patient is a 58 y.o. male with significant past medical history of HTN, smoking presented to hospital complaining of acute onset left sided weakness and dysarthria.  LKN ~2130.  The patient was in his usual state of health and had gone to [employer].  After leaving the store the patient noted LUE "heaviness".  He states he also noted slurred speech.  EMS was activated and noted patient's SBP>200.  A stroke code was activated and the patient was brought to the ED.  On arrival the patient is AA&O x3.  He denies HA, CP, N/V, change in vision.  Nothing exacerbated or alleviated his symptoms.  He states he took his BP meds this evening.  A stat CTH was obtained and is negative for acute findings.  Patient has no contraindications for tPA, however his SBP is >180.  Cardene was started.  A CTA Stroke MP was obtained and reviewed as images were acquired by Dr. Menchaca.  No LVO.  Just after completion of the CT the patient's SBP improved to 170s.  Upon return to the ED room the patient was reassessed.  No left sided drift, improved speech and facial droop.  Patient able to ambulate in the ED room with steady gait and without assistance.  He remains w/o complaint of HA or vision change but does endorse feeling light headed.  Will not receive tPA at this time due to rapidly improving symptoms.        MRI shows acute R thalamic infarct. Etiology small vessel disease. Patient put on DAPT and continuing home Lipitor. PT/OT/SLP recommend outpatient therapy. " "Patient educated on aggressive risk factor modification. Follow up in VN clinic 4-6 weeks.      Hospital Course (synopsis of major diagnoses, care, treatment, and services provided during the course of the hospital stay): Prolonged stay for the following reasons:      5/15: Admitted overnight for acute LSW and dysarthria. SBP 200s in ED. CT head negative. No TPA given due to great improvement in symptoms. MRI R thalamic acute infarct. Echo complete. DAPT started (no load; previous hx of rectal bleeding while on plavix) and statin. OT reccommend outpatient rehab. PT eval pending.   5/16: No significant events overnight. LUE weakness improving. Restart home clonidine. Pending PT eval may discharge today."         Interval Hx 12/19/2023:   Patient presents today for stroke follow up.  He has started outpatient therapies and states they are working him hard. His sister was able to join for his appointment. She reports having stepped back from helping him as he gets agitated with her.     She was helping with his medications but the patient became frustrated with her. At one point he informed he was taking all of his medications three times per day. He never did get an appointment with PM&R as they were unable to reach him. He is now seeing a new MD at [facility] and gets free transportation from them. He did follow up with cardiology and no afib was identified on recent ILR interrogation. He is scheduled for a TTE next month.     He continues to smoke cigarettes 1 PPD.           Past Medical, Surgical, Family & Social History:   Reviewed and updated.    Home Medications:     Current Outpatient Medications:     aspirin 81 MG Chew, Take 81 mg by mouth once daily., Disp: , Rfl:     atorvastatin (LIPITOR) 80 MG tablet, Take 1 tablet (80 mg total) by mouth once daily., Disp: 90 tablet, Rfl: 3    carvediloL (COREG) 6.25 MG tablet, Take 6.25 mg by mouth 2 (two) times daily with meals., Disp: , Rfl:     divalproex (DEPAKOTE) " "125 MG EC tablet, Take 125 mg by mouth 3 (three) times daily., Disp: , Rfl:     pantoprazole (PROTONIX) 40 MG tablet, Take 40 mg by mouth Daily. Indications: gastroesophageal reflux disease, Disp: , Rfl:     QUEtiapine (SEROQUEL) 25 MG Tab, 1 tab at bedtime, Disp: 90 tablet, Rfl: 3    sacubitriL-valsartan (ENTRESTO) 24-26 mg per tablet, Take 1 tablet by mouth once daily., Disp: 90 tablet, Rfl: 3    Physical Examination:  BP (!) 150/86 (BP Location: Left arm, Patient Position: Sitting, BP Method: Small (Automatic))   Pulse 74   Resp 18   Ht 5' 10" (1.778 m)   Wt 101.4 kg (223 lb 7 oz)   BMI 32.06 kg/m²     GENERAL:  General appearance: Well, non-toxic appearing.  No apparent distress.  Neck: supple.  .     MENTAL STATUS:  Alertness, attention span & concentration: decreased  Language: normal.  Orientation to self, place & time:  normal.  Memory, recent & remote: normal.  Fund of knowledge: normal.        SPEECH:  Mild dysarthria  Follows simple commands.        CRANIAL NERVES:  Cranial Nerves II-XII were examined.  II - Visual fields: normal.  III, IV, VI: PERRL, EOMI, No ptosis, No nystagmus.  V - Facial sensation: normal.  VII - Face symmetry & mobility: mild left facial droop  VIII - Hearing: normal  IX, X - Palate: mobile & midline.  XI - Shoulder shrug: normal.  XII - Tongue protrusion: normal.           GROSS MOTOR:  Gait & station: hemiplegic gait; uses cane;left foot drag  Tone: spastic LUE  Abnormal movements: none.  Finger-nose: abnormal  Rapid alternating movements: normal.  Pronator drift: normal        MUSCLE STRENGTH:   Hand grasp:   - right:5/5   - left:1/5     RIGHT      LEFT   5 Neck Ext. 5   5 Neck Flex 5   5 Deltoids 2   5 Biceps 3   5 Triceps 3   5 Forearm.Pr. 2   5 Wrist Ext. 0   5 Wrist Flex 0   5 Finger Ext. 0   5 Finger Flex 0           5 Iliopsoas flex    4   5 Hip Abduct 3   5 Hip Adduct 3   5 Quads 1   5 Hams 2   5 Dorsiflex 5   5 Plantar Flex 5            REFLEXES:     RIGHT Reflex    " LEFT   2+ Biceps 3   2+ Brachiorad. 3           2+ Patellar 2+      SENSORY:  Light touch: Normal throughout.           Diagnostic Data Reviewed:   I have personally reviewed provider notes, labs and imaging made available to me today.     Imaging:  Outside records     MRI brain 7/2023:  Impression  Redemonstrated large acute/early subacute right MCA vascular territory infarct. No evidence of hemorrhagic conversion     CTA 7/2023:  Impression  Abrupt occlusion of the right M1 segment with a large acute/early subacute infarct in the right MCA vascular territory with a moderate amount of tissue at risk/penumbra. No evidence of hemorrhagic conversion.      IR Thrombolysis 7/2023:  Impression  Successful catheter directed thrombectomy of an M1 segment right middle cerebral artery thrombus                 MRI BRAIN WITHOUT CONTRAST 5/2019     Narrative  EXAMINATION:  MRI BRAIN WITHOUT CONTRAST     CLINICAL HISTORY:  Stroke;     TECHNIQUE:  Multiplanar multisequence MR imaging of the brain was performed without contrast.     COMPARISON:  CT head 05/14/2019, CTA stroke multiphase 05/14/2019     FINDINGS:  Intracranial compartment:     Ventricles and sulci are normal in size for age without evidence of hydrocephalus.     No extra-axial blood or fluid collections     Mild generalized cerebral volume loss with compensatory prominence of the ventricular system and sulci.  Small area of minimal restricted diffusion in the anterior aspect of the right thalamus may reflect a recent lacunar type infarct.  Old lacunar infarct in the left corona radiata and right basal ganglia.  Superimposed scattered small foci of T2/FLAIR hyperintense signal in a periventricular distribution of the supratentorial white matter suggestive for chronic microvascular ischemic change.     Normal vascular flow voids are preserved.     Skull/extracranial contents (limited evaluation): Bone marrow signal intensity is normal.     IMPRESSION:     Small focus  of minimal restricted diffusion in the anterior aspect of the right thalamus, likely a recent lacunar infarct.     Bilateral remote lacunar infarcts.     Senescent and chronic microvascular ischemic changes.        CT Head Without Contrast 5/2019     Narrative  EXAMINATION:  CT HEAD WITHOUT CONTRAST     CLINICAL HISTORY:  Focal neuro deficit, new, fixed or worsening, <6 hours;     TECHNIQUE:  Low dose axial images were obtained through the head.  Coronal and sagittal reformations were also performed. Contrast was not administered.     COMPARISON:  None.     FINDINGS:  There is mild generalized cerebral volume loss.  There is patchy periventricular and supratentorial white matter hypoattenuation, particularly within the left corona radiata, suggestive of sequelae of chronic microvascular ischemic change.  No large region of abnormal parenchymal hypoattenuation identified.  There is no intracranial hemorrhage or midline shift.  No extra-axial fluid collections appreciated.  The basal cisterns are patent. The mastoid air cells and paranasal sinuses are clear of acute process. The visualized bones of the calvarium demonstrate no acute osseous abnormality.     IMPRESSION:     1.  No CT evidence of acute intracranial abnormality.  If there is concern for acute ischemia, further evaluation could be performed with MRI.     2.  Patchy periventricular and supratentorial white matter hypoattenuation, nonspecific, although suggestive sequelae of chronic microvascular ischemic change.        CTA STROKE MULTI-PHASE 5/2019     Narrative  EXAMINATION:  CTA STROKE MULTI-PHASE     CLINICAL HISTORY:  Left-sided facial droop, left upper extremity with weakness, and left pronator drift.     TECHNIQUE:  CT angiogram was performed from the level of the nicole to the top of the head following the IV administration of 100mL of Omnipaque 350.   Sagittal and coronal reconstructions and maximum intensity projection reconstructions were  performed. Arterial stenosis percentages are based on NASCET measurement criteria.  Two additional phases of immediate post-contrast CTA images were performed through the head alone.     COMPARISON:  CT head 05/14/2019.     FINDINGS:  Non-Vascular Structures of the Neck/Thoracic Inlet (limited evaluation): Paraseptal emphysematous changes involving bilateral lung apices.  Small hypoattenuating nodule within the right thyroid lobe which is likely of minimal clinical significance given lesion size and patient age.     Aorta: Normal 3 vessel arch.     Extracranial carotid circulation: Multifocal irregularity and mild narrowing suggestive for atherosclerotic change.  No hemodynamically significant stenosis, aneurysmal dilatation, or dissection.     Extracranial vertebral circulation: Multifocal irregularity and mild narrowing suggestive for atherosclerotic change.  The left vertebral artery dominant.   No hemodynamically significant stenosis, aneurysmal dilatation, or dissection.     Intracranial Arteries:     Multifocal irregularity and mild narrowing suggestive for atherosclerotic change.  There is a long segment string like narrowing of the left A1. There is a large calcified nonocclusive plaque involving the V4 segment of the left vertebral artery.  The remainder of the intracranial arteries are within normal limits without focal stenosis, aneurysmal dilatation, or major branch vessel occlusion.     Venous structures (limited evaluation): No evidence thrombosis or occlusion.  Incidental note made of a dehiscent right jugular bulb.     IMPRESSION:     No CT findings of early ischemic changes.  MRI may be obtained for further evaluation.     Mild multifocal narrowing involving the remainder of the intracranial vessels without evidence of hemodynamically significant stenosis or large vessel occlusion.     Diffuse string like narrowing of the A1 segment of the left vertebral artery.  The chronicity of this is difficult  "to determine.     No hemodynamically significant stenosis of the neck vessels.     Incidental note made of dehiscent right jugular bulb.     Paraseptal emphysematous changes of bilateral lung apices.        Cardiac:    Labs:  Lab Results   Component Value Date    WBC 8.35 11/03/2023    HGB 15.1 11/03/2023    HCT 45.7 11/03/2023     11/03/2023    MCV 91 11/03/2023    RDW 14.6 (H) 11/03/2023     Lab Results   Component Value Date     11/03/2023    K 4.5 11/03/2023     11/03/2023    CO2 23 11/03/2023    BUN 18 11/03/2023    CREATININE 0.71 11/03/2023     (H) 11/03/2023    CALCIUM 8.9 11/03/2023    MG 2.1 05/14/2019    PHOS 4.1 05/14/2019     Lab Results   Component Value Date    PROT 7.1 05/15/2019    ALBUMIN 3.3 (L) 08/01/2023    BILITOT 0.2 05/15/2019    AST 14 05/15/2019    ALKPHOS 84 05/15/2019    ALT 15 05/15/2019     Lab Results   Component Value Date    INR 1.1 07/09/2023     Lab Results   Component Value Date    CHOL 114 07/10/2023    HDL 31 (L) 07/10/2023    LDLCALC 70 07/10/2023    TRIG 64 07/10/2023    CHOLHDL 21.6 05/14/2019     Lab Results   Component Value Date    HGBA1C 5.8 07/09/2023      No results found for: "HEJHFVEO87"  No results found for: "FOLATE"  Lab Results   Component Value Date    TSH 0.976 07/09/2023                       Assessment and Plan:      Problem List Items Addressed This Visit          Neuro    Cerebrovascular accident (CVA) due to occlusion of right middle cerebral artery - Primary    Current Assessment & Plan     Patient is a 64 y/o male that presents for right MCA stroke f/u   - presented to hosp with left sided hemiplegia, right sided gaze preference; NIHSS 13 on admit  S/p thrombectomy and tPA  CTA with occlusion right M1 segment with large acute/subacute right MCA infarct  MRI brain scan re demonstrated large acute/subuacte right MCA infarct  F/u CTH without hemorrhage   TTE with depressed EF 40-45%; mildly dilated LA; neg for PFO  HALEY with EF " 60-65%; neg for PFO  Etiology Cryptogenic   Pt now with ILR to assess for Afib component   - no afib on recent interrogation      Cont ASA 81 mg QD  Control vascular risk factors:   - BP management as per PCP   - LDL at goal   - A1c at goal   - h/o ETOH abuse   - diet modification    - stop smoking!!     Though pt did complete inpt rehab and HH therapies, he did not transition to outpt therapies. He is now participating in outpt PT/OT but not consistent with home exercises.     He lives alone and I am questioning med compliance.  he refuses sisters help and has told her he was taking all of his meds TID. Pt is poor historian. After lengthy discussion he has agreed to let her help him          Hemiparesis affecting left side as late effect of cerebrovascular accident    Current Assessment & Plan     Encouraged pt to be evaluated by PM&R   - he can only get transportation to appts if Mds at [facility] refer him. I have given this info to his sister            Psychiatric    Mood disorder    Current Assessment & Plan     According to EMR, pt was started on Depakote at inpt rehab for increased agitation.   Referral placed to Psych for med/mood management                Other    Tobacco abuse    Current Assessment & Plan     Vascular risk factor  Educated pt on smoking cessation  He plans on attending smoking cessation program this month                            Important to note, also  has a past medical history of Abnormal PFT, Cardiac murmur, Cardiomegaly, CKD (chronic kidney disease), Coronary artery disease, Depression, DVT (deep venous thrombosis), Dyspnea, Edema, Episode of transient neurologic symptoms (05/14/2019), Essential hypertension (04/15/2014), GI bleed, Heart attack (06/01/2023), High cholesterol, Hypertension, Mixed hyperlipidemia (04/15/2014), PVD (peripheral vascular disease), Stroke (06/01/2023), and Thrombotic stroke involving right middle cerebral artery (05/14/2019).          The patient will  return to clinic in 4-6 months.    All questions were answered and patient is comfortable with the plan.         Thank you very much for the opportunity to assist in this patient's care.    If you have any questions or concerns, please do not hesitate to contact me at any time.      Sincerely,     YOLANDA Winters  [facility] [place]         I spent a total of 40 minutes on the day of the visit.This includes face to face time and non-face to face time preparing to see the patient (eg, review of tests), Obtaining and/or reviewing separately obtained history, Documenting clinical information in the electronic or other health record, Independently interpreting resultsand communicating results to the patient/family/caregiver, or Care coordination.

## 2023-12-19 NOTE — PATIENT INSTRUCTIONS
I would like for him to see Physical medicine and rehabilitation MD for lest side spasticity   - Dr. Lynn Nails works at [facility] but any PM&R MD will do.

## 2023-12-19 NOTE — ASSESSMENT & PLAN NOTE
Encouraged pt to be evaluated by PM&R   - he can only get transportation to [facility] if Mds at [facility] refer him. I have given this info to his sister

## 2023-12-21 ENCOUNTER — HOSPITAL ENCOUNTER (INPATIENT)
Facility: HOSPITAL | Age: 63
LOS: 15 days | Discharge: NURSING, REHAB OR LONG-TERM CARE | DRG: 240 | End: 2024-01-05
Attending: EMERGENCY MEDICINE | Admitting: SURGERY
Payer: MEDICARE

## 2023-12-21 DIAGNOSIS — I73.9 PERIPHERAL VASCULAR DISEASE: ICD-10-CM

## 2023-12-21 DIAGNOSIS — I70.229 CRITICAL LOWER LIMB ISCHEMIA: ICD-10-CM

## 2023-12-21 DIAGNOSIS — T82.868A THROMBOSIS OF FEMORO-POPLITEAL BYPASS GRAFT, INITIAL ENCOUNTER: Primary | ICD-10-CM

## 2023-12-21 DIAGNOSIS — I99.9 CIRCULATORY DISORDER: ICD-10-CM

## 2023-12-21 DIAGNOSIS — I73.9 PVD (PERIPHERAL VASCULAR DISEASE): ICD-10-CM

## 2023-12-21 LAB
APTT PPP: 50 SEC (ref 21–32)
APTT PPP: 99.7 SEC (ref 21–32)
HCV AB SERPL QL IA: NORMAL
HIV 1+2 AB+HIV1 P24 AG SERPL QL IA: NORMAL
INR PPP: 1.1 (ref 0.8–1.2)
PROTHROMBIN TIME: 11.9 SEC (ref 9–12.5)

## 2023-12-21 PROCEDURE — 87389 HIV-1 AG W/HIV-1&-2 AB AG IA: CPT | Performed by: PHYSICIAN ASSISTANT

## 2023-12-21 PROCEDURE — 25000003 PHARM REV CODE 250

## 2023-12-21 PROCEDURE — 85730 THROMBOPLASTIN TIME PARTIAL: CPT | Mod: 91 | Performed by: SURGERY

## 2023-12-21 PROCEDURE — 99285 EMERGENCY DEPT VISIT HI MDM: CPT | Mod: 25

## 2023-12-21 PROCEDURE — 96374 THER/PROPH/DIAG INJ IV PUSH: CPT

## 2023-12-21 PROCEDURE — 25500020 PHARM REV CODE 255: Performed by: SURGERY

## 2023-12-21 PROCEDURE — 85610 PROTHROMBIN TIME: CPT

## 2023-12-21 PROCEDURE — 85730 THROMBOPLASTIN TIME PARTIAL: CPT

## 2023-12-21 PROCEDURE — 12000002 HC ACUTE/MED SURGE SEMI-PRIVATE ROOM

## 2023-12-21 PROCEDURE — 86803 HEPATITIS C AB TEST: CPT | Performed by: PHYSICIAN ASSISTANT

## 2023-12-21 PROCEDURE — 93005 ELECTROCARDIOGRAM TRACING: CPT

## 2023-12-21 PROCEDURE — 63600175 PHARM REV CODE 636 W HCPCS

## 2023-12-21 PROCEDURE — 63600175 PHARM REV CODE 636 W HCPCS: Performed by: STUDENT IN AN ORGANIZED HEALTH CARE EDUCATION/TRAINING PROGRAM

## 2023-12-21 RX ORDER — ACETAMINOPHEN 325 MG/1
650 TABLET ORAL EVERY 4 HOURS PRN
Status: DISCONTINUED | OUTPATIENT
Start: 2023-12-21 | End: 2023-12-25

## 2023-12-21 RX ORDER — GUAIFENESIN 100 MG/5ML
81 LIQUID (ML) ORAL DAILY
Status: DISCONTINUED | OUTPATIENT
Start: 2023-12-22 | End: 2024-01-05 | Stop reason: HOSPADM

## 2023-12-21 RX ORDER — TALC
6 POWDER (GRAM) TOPICAL NIGHTLY PRN
Status: DISCONTINUED | OUTPATIENT
Start: 2023-12-21 | End: 2024-01-05 | Stop reason: HOSPADM

## 2023-12-21 RX ORDER — CARVEDILOL 3.12 MG/1
6.25 TABLET ORAL 2 TIMES DAILY WITH MEALS
Status: DISCONTINUED | OUTPATIENT
Start: 2023-12-21 | End: 2024-01-05 | Stop reason: HOSPADM

## 2023-12-21 RX ORDER — DIVALPROEX SODIUM 125 MG/1
125 TABLET, DELAYED RELEASE ORAL 3 TIMES DAILY
Status: DISCONTINUED | OUTPATIENT
Start: 2023-12-21 | End: 2024-01-05 | Stop reason: HOSPADM

## 2023-12-21 RX ORDER — HYDROCODONE BITARTRATE AND ACETAMINOPHEN 5; 325 MG/1; MG/1
1 TABLET ORAL EVERY 4 HOURS PRN
Status: DISCONTINUED | OUTPATIENT
Start: 2023-12-21 | End: 2023-12-25

## 2023-12-21 RX ORDER — ONDANSETRON 8 MG/1
8 TABLET, ORALLY DISINTEGRATING ORAL EVERY 8 HOURS PRN
Status: DISCONTINUED | OUTPATIENT
Start: 2023-12-21 | End: 2024-01-05 | Stop reason: HOSPADM

## 2023-12-21 RX ORDER — HYDROMORPHONE HYDROCHLORIDE 1 MG/ML
0.5 INJECTION, SOLUTION INTRAMUSCULAR; INTRAVENOUS; SUBCUTANEOUS
Status: DISCONTINUED | OUTPATIENT
Start: 2023-12-21 | End: 2023-12-28

## 2023-12-21 RX ORDER — QUETIAPINE FUMARATE 25 MG/1
25 TABLET, FILM COATED ORAL NIGHTLY
Status: DISCONTINUED | OUTPATIENT
Start: 2023-12-21 | End: 2024-01-05 | Stop reason: HOSPADM

## 2023-12-21 RX ORDER — SODIUM CHLORIDE, SODIUM LACTATE, POTASSIUM CHLORIDE, CALCIUM CHLORIDE 600; 310; 30; 20 MG/100ML; MG/100ML; MG/100ML; MG/100ML
INJECTION, SOLUTION INTRAVENOUS CONTINUOUS
Status: DISCONTINUED | OUTPATIENT
Start: 2023-12-21 | End: 2023-12-23

## 2023-12-21 RX ORDER — LIDOCAINE HYDROCHLORIDE 10 MG/ML
1 INJECTION, SOLUTION EPIDURAL; INFILTRATION; INTRACAUDAL; PERINEURAL ONCE
Status: DISCONTINUED | OUTPATIENT
Start: 2023-12-21 | End: 2023-12-23

## 2023-12-21 RX ORDER — SODIUM CHLORIDE 0.9 % (FLUSH) 0.9 %
10 SYRINGE (ML) INJECTION EVERY 6 HOURS PRN
Status: DISCONTINUED | OUTPATIENT
Start: 2023-12-21 | End: 2024-01-05 | Stop reason: HOSPADM

## 2023-12-21 RX ORDER — PANTOPRAZOLE SODIUM 40 MG/1
40 TABLET, DELAYED RELEASE ORAL DAILY
Status: DISCONTINUED | OUTPATIENT
Start: 2023-12-21 | End: 2024-01-05 | Stop reason: HOSPADM

## 2023-12-21 RX ORDER — ATORVASTATIN CALCIUM 40 MG/1
80 TABLET, FILM COATED ORAL DAILY
Status: DISCONTINUED | OUTPATIENT
Start: 2023-12-22 | End: 2024-01-05 | Stop reason: HOSPADM

## 2023-12-21 RX ORDER — HEPARIN SODIUM,PORCINE/D5W 25000/250
0-40 INTRAVENOUS SOLUTION INTRAVENOUS CONTINUOUS
Status: DISCONTINUED | OUTPATIENT
Start: 2023-12-21 | End: 2023-12-22

## 2023-12-21 RX ADMIN — CARVEDILOL 6.25 MG: 6.25 TABLET, FILM COATED ORAL at 06:12

## 2023-12-21 RX ADMIN — HEPARIN SODIUM 18 UNITS/KG/HR: 10000 INJECTION, SOLUTION INTRAVENOUS at 02:12

## 2023-12-21 RX ADMIN — DIVALPROEX SODIUM 125 MG: 125 TABLET, DELAYED RELEASE ORAL at 10:12

## 2023-12-21 RX ADMIN — IOHEXOL 100 ML: 350 INJECTION, SOLUTION INTRAVENOUS at 10:12

## 2023-12-21 RX ADMIN — PANTOPRAZOLE SODIUM 40 MG: 40 TABLET, DELAYED RELEASE ORAL at 06:12

## 2023-12-21 RX ADMIN — SODIUM CHLORIDE, POTASSIUM CHLORIDE, SODIUM LACTATE AND CALCIUM CHLORIDE: 600; 310; 30; 20 INJECTION, SOLUTION INTRAVENOUS at 06:12

## 2023-12-21 RX ADMIN — HEPARIN SODIUM 18 UNITS/KG/HR: 10000 INJECTION, SOLUTION INTRAVENOUS at 09:12

## 2023-12-21 RX ADMIN — QUETIAPINE FUMARATE 25 MG: 25 TABLET ORAL at 10:12

## 2023-12-21 RX ADMIN — HYDROMORPHONE HYDROCHLORIDE 0.5 MG: 1 INJECTION, SOLUTION INTRAMUSCULAR; INTRAVENOUS; SUBCUTANEOUS at 10:12

## 2023-12-21 NOTE — Clinical Note
85 ml of contrast were injected throughout the case. 15 mL of contrast was the total wasted during the case. 100 mL was the total amount used during the case.

## 2023-12-21 NOTE — ED NOTES
Pt's heparin order is based on 77.4 kg. Pt currently weights 84 kg. MD Jc authorized for heparin drip to run based on 77.4 kg.

## 2023-12-21 NOTE — Clinical Note
The balloon was inserted into the  proximal left common iliac arteries. Balloon inflated and removed..

## 2023-12-21 NOTE — ED PROVIDER NOTES
Encounter Date: 12/21/2023       History     Chief Complaint   Patient presents with    Circulatory Problem     Arrival via EMS, Fem pop occlusion to [facility], coming for vascular surgery consult, extremity cold to touch, sensation decreased.      Jamari Huerta III is a 63 y.o. male with a PMH of CAD, CVA, CKD, LLE DVT in 2002, not currently on anticoagulation presenting to [facility] via EMS as a transfer from [facility] with a fem pop bypass occlusion in [facility], coming for vascular surgery consult, extremity cold to touch, sensation decreased. Patient says the pain in his left calf and anterior lower leg woke him up in the middle of the night.  He denies any weakness in his left leg.  Denies any recent falls or trauma.    The history is provided by the patient and medical records. No  was used.     Review of patient's allergies indicates:   Allergen Reactions    Bupropion Swelling    Zyban [bupropion hcl (smoking deter)] Swelling    Morphine Itching and Rash     Past Medical History:   Diagnosis Date    Abnormal PFT     Cardiac murmur     Cardiomegaly     CKD (chronic kidney disease)     Coronary artery disease     Depression     DVT (deep venous thrombosis)     left lower leg    Dyspnea     Edema     Episode of transient neurologic symptoms 05/14/2019    Essential hypertension 04/15/2014    GI bleed     Heart attack 06/01/2023    approxiamate date    High cholesterol     Hypertension     Mixed hyperlipidemia 04/15/2014    PVD (peripheral vascular disease)     Stroke 06/01/2023    aprroximate date    Thrombotic stroke involving right middle cerebral artery 05/14/2019     Past Surgical History:   Procedure Laterality Date    AORTIC VALVE SURGERY      1982 -    VASCULAR SURGERY      left lower leg     Family History   Problem Relation Age of Onset    Diabetes Mother      Social History     Tobacco Use    Smoking status: Every Day     Current packs/day: 1.00     Average packs/day: 1  pack/day for 55.0 years (55.0 ttl pk-yrs)     Types: Cigarettes     Start date: 1/1/1969    Smokeless tobacco: Never   Substance Use Topics    Alcohol use: No     Comment: Hx of drinking alot when younger    Drug use: No     Review of Systems    Physical Exam     Initial Vitals [12/21/23 1257]   BP Pulse Resp Temp SpO2   (!) 176/79 86 18 98.9 °F (37.2 °C) 100 %      MAP       --         Physical Exam    Nursing note and vitals reviewed.  Constitutional: He appears well-developed and well-nourished. No distress.   HENT:   Head: Normocephalic.   Mouth/Throat: Oropharynx is clear and moist.   Eyes: Pupils are equal, round, and reactive to light. No scleral icterus.   Neck: Neck supple.   Cardiovascular:  Normal rate and regular rhythm.           Pulmonary/Chest: Breath sounds normal. No stridor. No respiratory distress.   Abdominal: Abdomen is soft. He exhibits no distension. There is no abdominal tenderness.   Musculoskeletal:         General: No edema.      Cervical back: Neck supple.      Comments: Left lower extremity is slightly cool to touch with decreased sensation compared to right lower extremity.  Well-healed scar over anteromedial left calf.  No palpable DP or PT pulses on the left.  No dopplerable pulses on the left.     Neurological: He is alert. GCS score is 15. GCS eye subscore is 4. GCS verbal subscore is 5. GCS motor subscore is 6.   Skin: Skin is warm and dry.         ED Course   Procedures  Labs Reviewed   APTT - Abnormal; Notable for the following components:       Result Value    aPTT 99.7 (*)     All other components within normal limits    Narrative:     Draw baseline aPTT prior to starting the heparin bolus or  infusion  (if patient is on warfarin prior to heparin therapy)   PROTIME-INR    Narrative:     Draw baseline aPTT prior to starting the heparin bolus or  infusion  (if patient is on warfarin prior to heparin therapy)   HIV 1 / 2 ANTIBODY   HEPATITIS C ANTIBODY        ECG Results               EKG 12-lead (In process)  Result time 12/21/23 14:33:27      In process by Interface, Lab In [facility] (12/21/23 14:33:27)                   Narrative:    Test Reason : I99.9,    Vent. Rate : 063 BPM     Atrial Rate : 063 BPM     P-R Int : 184 ms          QRS Dur : 094 ms      QT Int : 420 ms       P-R-T Axes : 057 014 069 degrees     QTc Int : 429 ms    Normal sinus rhythm  Septal infarct ,age undetermined  Abnormal ECG  When compared with ECG of 14-MAY-2019 22:17,  Septal infarct is now Present  T wave inversion now evident in Lateral leads    Referred By: MARILEE OSORIO           Confirmed By:                                      EKG 12-lead (In process)  Result time 12/21/23 15:16:58      In process by Interface, Lab In [facility] (12/21/23 15:16:58)                   Narrative:    Test Reason : I99.9,    Vent. Rate : 063 BPM     Atrial Rate : 063 BPM     P-R Int : 184 ms          QRS Dur : 094 ms      QT Int : 420 ms       P-R-T Axes : 057 014 069 degrees     QTc Int : 429 ms    Normal sinus rhythm  Septal infarct ,age undetermined  Abnormal ECG  When compared with ECG of 14-MAY-2019 22:17,  Septal infarct is now Present  T wave inversion now evident in Lateral leads    Referred By: [email or web address]   SELF           Confirmed By:                                   Imaging Results    None          Medications   heparin 25,000 units in dextrose 5% (100 units/ml) IV bolus from bag INITIAL BOLUS (6,192 Units Intravenous Not Given 12/21/23 1330)   heparin 25,000 units in dextrose 5% 250 mL (100 units/mL) infusion HIGH INTENSITY nomogram - [facility] (18 Units/kg/hr × 77.4 kg (Adjusted) Intravenous New Bag 12/21/23 1416)   heparin 25,000 units in dextrose 5% (100 units/ml) IV bolus from bag - ADDITIONAL PRN BOLUS - 60 units/kg (has no administration in time range)   heparin 25,000 units in dextrose 5% (100 units/ml) IV bolus from bag - ADDITIONAL PRN BOLUS - 30 units/kg (has no administration in time range)   aspirin chewable  tablet 81 mg (has no administration in time range)   atorvastatin tablet 80 mg (has no administration in time range)   carvediloL tablet 6.25 mg (has no administration in time range)   divalproex EC tablet 125 mg (has no administration in time range)   pantoprazole EC tablet 40 mg (has no administration in time range)   QUEtiapine tablet 25 mg (has no administration in time range)   sacubitriL-valsartan 24-26 mg per tablet 1 tablet (has no administration in time range)   LIDOcaine (PF) 10 mg/ml (1%) injection 10 mg (has no administration in time range)   sodium chloride 0.9% flush 10 mL (has no administration in time range)   melatonin tablet 6 mg (has no administration in time range)   acetaminophen tablet 650 mg (has no administration in time range)   ondansetron disintegrating tablet 8 mg (has no administration in time range)   lactated ringers infusion (has no administration in time range)     Medical Decision Making  63-year-old male presents as transfer from [place] for left lower extremity critical limb ischemia.  Prior arterial ultrasound demonstrates occlusion to left fem pop bypass without DVT on the left.    Discussed with vascular surgery who is agreed to evaluate the patient at bedside.  Continuing heparin drip that was started at outside hospital.  Patient will be admitted to vascular surgery service for further evaluation and management.  Discussed all findings and plan with the patient who expressed understanding and agreement.    Amount and/or Complexity of Data Reviewed  Labs: ordered. Decision-making details documented in ED Course.    Risk  Prescription drug management.  Decision regarding hospitalization.    Critical Care  Total time providing critical care: 35 minutes              Attending Attestation:   Physician Attestation Statement for Resident:  As the supervising MD   Physician Attestation Statement: I have personally seen and examined this patient.   I agree with the above  history.  -:   As the supervising MD I agree with the above PE.     As the supervising MD I agree with the above treatment, course, plan, and disposition.   -: 64 y/o M with HLD, CAD, prior DVT, PAD s/p let fem pop BPG presents from OSH for graft occlusion. Pt notes sudden onset pain lle. Lle cool to touch. No palpable dp/pt. Vascular surgery consulted. Heparin gtt continued. Plan to admit vascular surgery.   I have reviewed and agree with the residents interpretation of the following: lab data.                                  Clinical Impression:  Final diagnoses:  [I70.229] Critical lower limb ischemia  [I73.9] Peripheral vascular disease          ED Disposition Condition    Admit Stable                Justyna Greene MD  Resident  12/21/23 3951       Elsa Gillespie MD  12/21/23 [id number]

## 2023-12-21 NOTE — CONSULTS
[facility] - Emergency Dept  Vascular Surgery  Consult Note    Inpatient consult to Vascular Surgery  Consult performed by: Ludin Chun MD  Consult ordered by: Justyna Greene MD        Subjective:     Chief Complaint/Reason for Admission: Pain LLE concern for acute limb ischemia    History of Present Illness: Jamari Huerta III is a 63 y.o. male with a PMH of CAD, CVA, CKD, LLE DVT in 2002, not currently on anticoagulation [place] [facility] [place] as a transfer from [facility] with a fem pop bypass occlusion in LLE, coming for vascular surgery consult, extremity cold to touch, sensation decreased. Patient says the pain woke him up in the middle of the night.  Pain is located to his left calf and left anterior lower leg.     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 Pt reports that around midnight last night he had sudden 10/10 pain that woke him up from his sleep. He states that over the next couple of hours his pain had completely resolved. Upon interview, pt wanted to use the restroom and walked to the restroom himself. He reported some calf pain and shin pain afterwards on his walk back to the bed (~10ft). The calf and shin pain has been happening for months. He reports that he has been having decreasing sensation on the left compared to the right for months. He admits that his left lower extremity is more swollen than the right. Admits that his left leg is slowly becoming weaker over months. Pt reports that he takes ASA and continues to smoke. Has a small wound in the left great toe, reports he was trying to cut his nails with scissors and cut his skin, this has been healing and only happened a week ago.    (Not in a hospital admission)      Review of patient's allergies indicates:   Allergen Reactions    Bupropion Swelling    Zyban [bupropion hcl (smoking deter)] Swelling    Morphine Itching and Rash       Past Medical History:   Diagnosis Date    Abnormal PFT     Cardiac murmur     Cardiomegaly      CKD (chronic kidney disease)     Coronary artery disease     Depression     DVT (deep venous thrombosis)     left lower leg    Dyspnea     Edema     Episode of transient neurologic symptoms 05/14/2019    Essential hypertension 04/15/2014    GI bleed     Heart attack 06/01/2023    approxiamate date    High cholesterol     Hypertension     Mixed hyperlipidemia 04/15/2014    PVD (peripheral vascular disease)     Stroke 06/01/2023    aprroximate date    Thrombotic stroke involving right middle cerebral artery 05/14/2019     Past Surgical History:   Procedure Laterality Date    AORTIC VALVE SURGERY      1982 -    VASCULAR SURGERY      left lower leg     Family History       Problem Relation (Age of Onset)    Diabetes Mother          Tobacco Use    Smoking status: Every Day     Current packs/day: 1.00     Average packs/day: 1 pack/day for 55.0 years (55.0 ttl pk-yrs)     Types: Cigarettes     Start date: 1/1/1969    Smokeless tobacco: Never   Substance and Sexual Activity    Alcohol use: No     Comment: Hx of drinking alot when younger    Drug use: No    Sexual activity: Not on file     Review of Systems   Constitutional:  Negative for activity change, appetite change and chills.   HENT:  Negative for congestion, ear pain and hearing loss.    Eyes:  Negative for pain, discharge and itching.   Respiratory:  Negative for choking, chest tightness and shortness of breath.    Cardiovascular:  Negative for chest pain and palpitations.   Gastrointestinal:  Negative for abdominal distention, abdominal pain and anal bleeding.   Genitourinary:  Negative for difficulty urinating and dysuria.   Musculoskeletal:  Positive for myalgias (calf, shin on walking not at rest).   Neurological:  Positive for weakness (mild L>R).     Objective:     Vital Signs (Most Recent):  Temp: 98.6 °F (37 °C) (12/21/23 1317)  Pulse: 62 (12/21/23 1432)  Resp: 18 (12/21/23 1432)  BP: (!) 183/83 (12/21/23 1432)  SpO2: 95 % (12/21/23 1432) Vital Signs (24h  Range):  Temp:  [98.6 °F (37 °C)-98.9 °F (37.2 °C)] 98.6 °F (37 °C)  Pulse:  [62-90] 62  Resp:  [11-20] 18  SpO2:  [95 %-100 %] 95 %  BP: (152-183)/(71-87) 183/83     Weight: 84 kg (185 lb 3 oz)  Body mass index is 26.57 kg/m².      Physical Exam  Constitutional:       Appearance: Normal appearance.   HENT:      Head: Normocephalic and atraumatic.      Nose: Nose normal.      Mouth/Throat:      Mouth: Mucous membranes are moist.      Pharynx: Oropharynx is clear.   Eyes:      Extraocular Movements: Extraocular movements intact.      Conjunctiva/sclera: Conjunctivae normal.   Cardiovascular:      Rate and Rhythm: Normal rate and regular rhythm.      Comments: L Femoral pulse not palpable, L PT monophasic extremely diminished, DP not heard on doppler  R femoral pulse +1, popliteal not palpable  Pulmonary:      Effort: Pulmonary effort is normal.      Breath sounds: Normal breath sounds.   Abdominal:      General: Abdomen is flat.      Palpations: Abdomen is soft.   Musculoskeletal:      Comments: Tender to palpation left foot and calf  Strength diminished in left knee flexion, extension and ankle flexion and extension, chronic  Sensation dimninished in left compared to right in left lower extremity (chronic)   Skin:     General: Skin is warm and dry.      Capillary Refill: Capillary refill takes less than 2 seconds.   Neurological:      General: No focal deficit present.      Mental Status: He is alert.          Significant Labs:  All pertinent labs from the last 24 hours have been reviewed.    Significant Diagnostics:  I have reviewed all pertinent imaging results/findings within the past 24 hours.  Assessment/Plan:     Thrombosis of femoro-popliteal bypass graft  63 M with long history of PAD, s/p SFA to above the knee popliteal artery bypass, current smoker, presents as a transfer due to concern for decreased sensation with concern for acute limb ischemia. Sensation decrease and motor weakness have been going on  for months per the patient which is not consistent with acute limb ischemia and more consistent with worsening PAD. US does show that his bypass is occluded and may be causing the discomfort, edema he is experiencing currently for which he will need a thrombolysis catheter placement to declot. His pain has improved which could be secondary to starting anticoagulation. His distal left toe has a wound that has been healing which is a good sign of good perfusion.    - admit to vascular surgery  - cont hep gtt, titrate with PTT  - NPO  - mIVF  - cont home meds  - ambulate with assistive device in room with nursing  - will obtain consent and annamarie the patient  - case request sent for cath lab 12/22 thrombolysis catheter placement, possible intervention with stents or PTA          Thank you for your consult. I will follow-up with patient. Please contact us if you have any additional questions.    Ludin Chun MD  Vascular Surgery  [facility]    VASCULAR SURGERY ATTENDING ATTESTATION    I have seen and examined the patient and I have taken the history and reviewed the chart/studies and personally reviewed and interpreted the relevant imaging. Patient with thrombosed dilated left lower extremity bypass with GSV conduit. Will perform angiogram and attempt thrombolysis to restore patency and resolve his rest pain.     NATALIE Mitchell II, MD, [facility]  Vascular Surgery  [facility]

## 2023-12-21 NOTE — SUBJECTIVE & OBJECTIVE
(Not in a hospital admission)      Review of patient's allergies indicates:   Allergen Reactions    Bupropion Swelling    Zyban [bupropion hcl (smoking deter)] Swelling    Morphine Itching and Rash       Past Medical History:   Diagnosis Date    Abnormal PFT     Cardiac murmur     Cardiomegaly     CKD (chronic kidney disease)     Coronary artery disease     Depression     DVT (deep venous thrombosis)     left lower leg    Dyspnea     Edema     Episode of transient neurologic symptoms 05/14/2019    Essential hypertension 04/15/2014    GI bleed     Heart attack 06/01/2023    approxiamate date    High cholesterol     Hypertension     Mixed hyperlipidemia 04/15/2014    PVD (peripheral vascular disease)     Stroke 06/01/2023    aprroximate date    Thrombotic stroke involving right middle cerebral artery 05/14/2019     Past Surgical History:   Procedure Laterality Date    AORTIC VALVE SURGERY      1982 -    VASCULAR SURGERY      left lower leg     Family History       Problem Relation (Age of Onset)    Diabetes Mother          Tobacco Use    Smoking status: Every Day     Current packs/day: 1.00     Average packs/day: 1 pack/day for 55.0 years (55.0 ttl pk-yrs)     Types: Cigarettes     Start date: 1/1/1969    Smokeless tobacco: Never   Substance and Sexual Activity    Alcohol use: No     Comment: Hx of drinking alot when younger    Drug use: No    Sexual activity: Not on file     Review of Systems   Constitutional:  Negative for activity change, appetite change and chills.   HENT:  Negative for congestion, ear pain and hearing loss.    Eyes:  Negative for pain, discharge and itching.   Respiratory:  Negative for choking, chest tightness and shortness of breath.    Cardiovascular:  Negative for chest pain and palpitations.   Gastrointestinal:  Negative for abdominal distention, abdominal pain and anal bleeding.   Genitourinary:  Negative for difficulty urinating and dysuria.   Musculoskeletal:  Positive for myalgias (calf,  shin on walking not at rest).   Neurological:  Positive for weakness (mild L>R).     Objective:     Vital Signs (Most Recent):  Temp: 98.6 °F (37 °C) (12/21/23 1317)  Pulse: 62 (12/21/23 1432)  Resp: 18 (12/21/23 1432)  BP: (!) 183/83 (12/21/23 1432)  SpO2: 95 % (12/21/23 1432) Vital Signs (24h Range):  Temp:  [98.6 °F (37 °C)-98.9 °F (37.2 °C)] 98.6 °F (37 °C)  Pulse:  [62-90] 62  Resp:  [11-20] 18  SpO2:  [95 %-100 %] 95 %  BP: (152-183)/(71-87) 183/83     Weight: 84 kg (185 lb 3 oz)  Body mass index is 26.57 kg/m².      Physical Exam  Constitutional:       Appearance: Normal appearance.   HENT:      Head: Normocephalic and atraumatic.      Nose: Nose normal.      Mouth/Throat:      Mouth: Mucous membranes are moist.      Pharynx: Oropharynx is clear.   Eyes:      Extraocular Movements: Extraocular movements intact.      Conjunctiva/sclera: Conjunctivae normal.   Cardiovascular:      Rate and Rhythm: Normal rate and regular rhythm.      Comments: L Femoral pulse not palpable, L PT monophasic extremely diminished, DP not heard on doppler  R femoral pulse +1, popliteal not palpable  Pulmonary:      Effort: Pulmonary effort is normal.      Breath sounds: Normal breath sounds.   Abdominal:      General: Abdomen is flat.      Palpations: Abdomen is soft.   Musculoskeletal:      Comments: Tender to palpation left foot and calf  Strength diminished in left knee flexion, extension and ankle flexion and extension, chronic  Sensation dimninished in left compared to right in left lower extremity (chronic)   Skin:     General: Skin is warm and dry.      Capillary Refill: Capillary refill takes less than 2 seconds.   Neurological:      General: No focal deficit present.      Mental Status: He is alert.          Significant Labs:  All pertinent labs from the last 24 hours have been reviewed.    Significant Diagnostics:  I have reviewed all pertinent imaging results/findings within the past 24 hours.

## 2023-12-21 NOTE — ED TRIAGE NOTES
Jamari Huerta III, a 63 y.o. male presents to the ED w/ complaint of Pt arrived via air med from [facility] w/ a femoral pop occlusion to LLE. LLE cold to touch and pt states a decreased in sensation. Pt arrived to ER with a heparin drip of 18u/kg/hr running.    Triage note:  Chief Complaint   Patient presents with    Circulatory Problem     Arrival via EMS, Fem pop occlusion to LLE, coming for vascular surgery consult, extremity cold to touch, sensation decreased.      Review of patient's allergies indicates:   Allergen Reactions    Bupropion Swelling    Zyban [bupropion hcl (smoking deter)] Swelling    Morphine Itching and Rash     Past Medical History:   Diagnosis Date    Abnormal PFT     Cardiac murmur     Cardiomegaly     CKD (chronic kidney disease)     Coronary artery disease     Depression     DVT (deep venous thrombosis)     left lower leg    Dyspnea     Edema     Episode of transient neurologic symptoms 05/14/2019    Essential hypertension 04/15/2014    GI bleed     Heart attack 06/01/2023    approxiamate date    High cholesterol     Hypertension     Mixed hyperlipidemia 04/15/2014    PVD (peripheral vascular disease)     Stroke 06/01/2023    aprroximate date    Thrombotic stroke involving right middle cerebral artery 05/14/2019

## 2023-12-21 NOTE — ASSESSMENT & PLAN NOTE
63 M with long history of PAD, s/p SFA to above the knee popliteal artery bypass, current smoker, presents as a transfer due to concern for decreased sensation with concern for acute limb ischemia. Sensation decrease and motor weakness have been going on for months per the patient which is not consistent with acute limb ischemia and more consistent with worsening PAD. US does show that his bypass is occluded and may be causing the discomfort, edema he is experiencing currently for which he will need a thrombolysis catheter placement to declot. His pain has improved which could be secondary to starting anticoagulation. His distal left toe has a wound that has been healing which is a good sign of good perfusion.    - admit to vascular surgery  - cont hep gtt, titrate with PTT  - NPO  - mIVF  - cont home meds  - ambulate with assistive device in room with nursing  - will obtain consent and annamarie the patient  - case request sent for cath lab 12/22 thrombolysis catheter placement, possible intervention with stents or PTA

## 2023-12-21 NOTE — Clinical Note
The catheter was inserted into the  proximal left infrapopliteal artery. TPA infusion done through catheter. Catheter secured in place.

## 2023-12-21 NOTE — HPI
[facility]    NUTRITION INITIAL ASSESSMENT    Pt meets moderate malnutrition criteria.     CRITERIA FOR MALNUTRITION DIAGNOSIS:  Criteria for non-severe malnutrition diagnosis: chronic illness related to wt loss 7.5% in 3 months and energy intake less Jamari Huerta III is a 63 y.o. male with a PMH of CAD, CVA, CKD, LLE DVT in 2002, not currently on anticoagulation presenting to [facility] Department via EMS as a transfer from [facility] with a fem pop bypass occlusion in [facility], coming for vascular surgery consult, extremity cold to touch, sensation decreased. Patient says the pain woke him up in the middle of the night.  Pain is located to his left calf and left anterior lower leg.     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 Pt reports that around midnight last night he had sudden 10/10 pain that woke him up from his sleep. He states that over the next couple of hours his pain had completely resolved. Upon interview, pt wanted to use the restroom and walked to the restroom himself. He reported some calf pain and shin pain afterwards on his walk back to the bed (~10ft). The calf and shin pain has been happening for months. He reports that he has been having decreasing sensation on the left compared to the right for months. He admits that his left lower extremity is more swollen than the right. Admits that his left leg is slowly becoming weaker over months. Pt reports that he takes ASA and continues to smoke. Has a small wound in the left great toe, reports he was trying to cut his nails with scissors and cut his skin, this has been healing and only happened a week ago.   oz)  02/02/19 : 61.7 kg (136 lb)  01/04/19 : 62.1 kg (137 lb)  10/03/18 : 60.8 kg (134 lb)  06/05/18 : 63.5 kg (140 lb)  03/06/18 : 61.5 kg (135 lb 8 oz)  02/24/14 : 63 kg (139 lb)  02/20/14 : 62.6 kg (138 lb)      NUTRITION:  Diet: Fulls  Oral Supplements

## 2023-12-22 ENCOUNTER — ANESTHESIA EVENT (OUTPATIENT)
Dept: SURGERY | Facility: HOSPITAL | Age: 63
DRG: 240 | End: 2023-12-22
Payer: MEDICARE

## 2023-12-22 LAB
ALBUMIN SERPL BCP-MCNC: 3.1 G/DL (ref 3.5–5.2)
ALBUMIN SERPL BCP-MCNC: 3.1 G/DL (ref 3.5–5.2)
ALP SERPL-CCNC: 89 U/L (ref 55–135)
ALP SERPL-CCNC: 91 U/L (ref 55–135)
ALT SERPL W/O P-5'-P-CCNC: 28 U/L (ref 10–44)
ALT SERPL W/O P-5'-P-CCNC: 29 U/L (ref 10–44)
ANION GAP SERPL CALC-SCNC: 10 MMOL/L (ref 8–16)
ANION GAP SERPL CALC-SCNC: 11 MMOL/L (ref 8–16)
APTT PPP: 32.1 SEC (ref 21–32)
APTT PPP: 33.9 SEC (ref 21–32)
APTT PPP: 37.7 SEC (ref 21–32)
APTT PPP: 52.2 SEC (ref 21–32)
APTT PPP: 86.5 SEC (ref 21–32)
AST SERPL-CCNC: 30 U/L (ref 10–40)
AST SERPL-CCNC: 30 U/L (ref 10–40)
BASOPHILS # BLD AUTO: 0.04 K/UL (ref 0–0.2)
BASOPHILS # BLD AUTO: 0.06 K/UL (ref 0–0.2)
BASOPHILS # BLD AUTO: 0.07 K/UL (ref 0–0.2)
BASOPHILS NFR BLD: 0.3 % (ref 0–1.9)
BASOPHILS NFR BLD: 0.6 % (ref 0–1.9)
BASOPHILS NFR BLD: 0.9 % (ref 0–1.9)
BILIRUB SERPL-MCNC: 0.5 MG/DL (ref 0.1–1)
BILIRUB SERPL-MCNC: 1.1 MG/DL (ref 0.1–1)
BUN SERPL-MCNC: 17 MG/DL (ref 8–23)
BUN SERPL-MCNC: 19 MG/DL (ref 8–23)
CALCIUM SERPL-MCNC: 8.9 MG/DL (ref 8.7–10.5)
CALCIUM SERPL-MCNC: 9 MG/DL (ref 8.7–10.5)
CHLORIDE SERPL-SCNC: 106 MMOL/L (ref 95–110)
CHLORIDE SERPL-SCNC: 106 MMOL/L (ref 95–110)
CO2 SERPL-SCNC: 22 MMOL/L (ref 23–29)
CO2 SERPL-SCNC: 22 MMOL/L (ref 23–29)
CREAT SERPL-MCNC: 0.8 MG/DL (ref 0.5–1.4)
CREAT SERPL-MCNC: 0.9 MG/DL (ref 0.5–1.4)
DIFFERENTIAL METHOD BLD: ABNORMAL
EOSINOPHIL # BLD AUTO: 0.1 K/UL (ref 0–0.5)
EOSINOPHIL # BLD AUTO: 0.2 K/UL (ref 0–0.5)
EOSINOPHIL # BLD AUTO: 0.3 K/UL (ref 0–0.5)
EOSINOPHIL # BLD AUTO: 0.3 K/UL (ref 0–0.5)
EOSINOPHIL # BLD AUTO: 0.4 K/UL (ref 0–0.5)
EOSINOPHIL NFR BLD: 0.5 % (ref 0–8)
EOSINOPHIL NFR BLD: 1.6 % (ref 0–8)
EOSINOPHIL NFR BLD: 3.1 % (ref 0–8)
EOSINOPHIL NFR BLD: 3.1 % (ref 0–8)
EOSINOPHIL NFR BLD: 4.7 % (ref 0–8)
ERYTHROCYTE [DISTWIDTH] IN BLOOD BY AUTOMATED COUNT: 15.3 % (ref 11.5–14.5)
ERYTHROCYTE [DISTWIDTH] IN BLOOD BY AUTOMATED COUNT: 15.4 % (ref 11.5–14.5)
ERYTHROCYTE [DISTWIDTH] IN BLOOD BY AUTOMATED COUNT: 15.5 % (ref 11.5–14.5)
EST. GFR  (NO RACE VARIABLE): >60 ML/MIN/1.73 M^2
EST. GFR  (NO RACE VARIABLE): >60 ML/MIN/1.73 M^2
FIBRINOGEN PPP-MCNC: 150 MG/DL (ref 182–400)
FIBRINOGEN PPP-MCNC: 269 MG/DL (ref 182–400)
FIBRINOGEN PPP-MCNC: 269 MG/DL (ref 182–400)
FIBRINOGEN PPP-MCNC: 365 MG/DL (ref 182–400)
FIBRINOGEN PPP-MCNC: 365 MG/DL (ref 182–400)
GLUCOSE SERPL-MCNC: 93 MG/DL (ref 70–110)
GLUCOSE SERPL-MCNC: 96 MG/DL (ref 70–110)
HCT VFR BLD AUTO: 45.5 % (ref 40–54)
HCT VFR BLD AUTO: 45.6 % (ref 40–54)
HCT VFR BLD AUTO: 45.6 % (ref 40–54)
HCT VFR BLD AUTO: 46.3 % (ref 40–54)
HCT VFR BLD AUTO: 47 % (ref 40–54)
HGB BLD-MCNC: 15.2 G/DL (ref 14–18)
HGB BLD-MCNC: 15.4 G/DL (ref 14–18)
HGB BLD-MCNC: 15.5 G/DL (ref 14–18)
HGB BLD-MCNC: 15.5 G/DL (ref 14–18)
HGB BLD-MCNC: 15.7 G/DL (ref 14–18)
IMM GRANULOCYTES # BLD AUTO: 0.02 K/UL (ref 0–0.04)
IMM GRANULOCYTES # BLD AUTO: 0.04 K/UL (ref 0–0.04)
IMM GRANULOCYTES # BLD AUTO: 0.11 K/UL (ref 0–0.04)
IMM GRANULOCYTES # BLD AUTO: 0.11 K/UL (ref 0–0.04)
IMM GRANULOCYTES # BLD AUTO: 0.15 K/UL (ref 0–0.04)
IMM GRANULOCYTES NFR BLD AUTO: 0.2 % (ref 0–0.5)
IMM GRANULOCYTES NFR BLD AUTO: 0.3 % (ref 0–0.5)
IMM GRANULOCYTES NFR BLD AUTO: 1.2 % (ref 0–0.5)
IMM GRANULOCYTES NFR BLD AUTO: 1.2 % (ref 0–0.5)
IMM GRANULOCYTES NFR BLD AUTO: 1.6 % (ref 0–0.5)
INR PPP: 1.1 (ref 0.8–1.2)
LYMPHOCYTES # BLD AUTO: 0.4 K/UL (ref 1–4.8)
LYMPHOCYTES # BLD AUTO: 0.5 K/UL (ref 1–4.8)
LYMPHOCYTES # BLD AUTO: 1 K/UL (ref 1–4.8)
LYMPHOCYTES # BLD AUTO: 1 K/UL (ref 1–4.8)
LYMPHOCYTES # BLD AUTO: 1.4 K/UL (ref 1–4.8)
LYMPHOCYTES NFR BLD: 10.4 % (ref 18–48)
LYMPHOCYTES NFR BLD: 10.4 % (ref 18–48)
LYMPHOCYTES NFR BLD: 17.4 % (ref 18–48)
LYMPHOCYTES NFR BLD: 3.3 % (ref 18–48)
LYMPHOCYTES NFR BLD: 5.2 % (ref 18–48)
MAGNESIUM SERPL-MCNC: 1.8 MG/DL (ref 1.6–2.6)
MAGNESIUM SERPL-MCNC: 1.8 MG/DL (ref 1.6–2.6)
MCH RBC QN AUTO: 30.6 PG (ref 27–31)
MCH RBC QN AUTO: 31.2 PG (ref 27–31)
MCH RBC QN AUTO: 31.3 PG (ref 27–31)
MCHC RBC AUTO-ENTMCNC: 32.8 G/DL (ref 32–36)
MCHC RBC AUTO-ENTMCNC: 33.4 G/DL (ref 32–36)
MCHC RBC AUTO-ENTMCNC: 33.8 G/DL (ref 32–36)
MCHC RBC AUTO-ENTMCNC: 34 G/DL (ref 32–36)
MCHC RBC AUTO-ENTMCNC: 34 G/DL (ref 32–36)
MCV RBC AUTO: 90 FL (ref 82–98)
MCV RBC AUTO: 94 FL (ref 82–98)
MCV RBC AUTO: 95 FL (ref 82–98)
MONOCYTES # BLD AUTO: 0.4 K/UL (ref 0.3–1)
MONOCYTES # BLD AUTO: 0.4 K/UL (ref 0.3–1)
MONOCYTES # BLD AUTO: 0.5 K/UL (ref 0.3–1)
MONOCYTES # BLD AUTO: 0.5 K/UL (ref 0.3–1)
MONOCYTES # BLD AUTO: 0.6 K/UL (ref 0.3–1)
MONOCYTES NFR BLD: 4.1 % (ref 4–15)
MONOCYTES NFR BLD: 4.1 % (ref 4–15)
MONOCYTES NFR BLD: 5.6 % (ref 4–15)
MONOCYTES NFR BLD: 5.7 % (ref 4–15)
MONOCYTES NFR BLD: 6.4 % (ref 4–15)
NEUTROPHILS # BLD AUTO: 10.4 K/UL (ref 1.8–7.7)
NEUTROPHILS # BLD AUTO: 5.7 K/UL (ref 1.8–7.7)
NEUTROPHILS # BLD AUTO: 7.7 K/UL (ref 1.8–7.7)
NEUTROPHILS # BLD AUTO: 7.7 K/UL (ref 1.8–7.7)
NEUTROPHILS # BLD AUTO: 8 K/UL (ref 1.8–7.7)
NEUTROPHILS NFR BLD: 70.4 % (ref 38–73)
NEUTROPHILS NFR BLD: 80.6 % (ref 38–73)
NEUTROPHILS NFR BLD: 80.6 % (ref 38–73)
NEUTROPHILS NFR BLD: 85.3 % (ref 38–73)
NEUTROPHILS NFR BLD: 90 % (ref 38–73)
NRBC BLD-RTO: 0 /100 WBC
PHOSPHATE SERPL-MCNC: 3.5 MG/DL (ref 2.7–4.5)
PHOSPHATE SERPL-MCNC: 3.6 MG/DL (ref 2.7–4.5)
PLATELET # BLD AUTO: 117 K/UL (ref 150–450)
PLATELET # BLD AUTO: 125 K/UL (ref 150–450)
PLATELET # BLD AUTO: 133 K/UL (ref 150–450)
PLATELET # BLD AUTO: 133 K/UL (ref 150–450)
PLATELET # BLD AUTO: 161 K/UL (ref 150–450)
PMV BLD AUTO: 10 FL (ref 9.2–12.9)
PMV BLD AUTO: 10 FL (ref 9.2–12.9)
PMV BLD AUTO: 10.2 FL (ref 9.2–12.9)
PMV BLD AUTO: 9.8 FL (ref 9.2–12.9)
PMV BLD AUTO: 9.8 FL (ref 9.2–12.9)
POCT GLUCOSE: 85 MG/DL (ref 70–110)
POTASSIUM SERPL-SCNC: 4.1 MMOL/L (ref 3.5–5.1)
POTASSIUM SERPL-SCNC: 4.4 MMOL/L (ref 3.5–5.1)
PROT SERPL-MCNC: 6.5 G/DL (ref 6–8.4)
PROT SERPL-MCNC: 6.6 G/DL (ref 6–8.4)
PROTHROMBIN TIME: 11.5 SEC (ref 9–12.5)
RBC # BLD AUTO: 4.87 M/UL (ref 4.6–6.2)
RBC # BLD AUTO: 5.02 M/UL (ref 4.6–6.2)
RBC # BLD AUTO: 5.04 M/UL (ref 4.6–6.2)
RBC # BLD AUTO: 5.06 M/UL (ref 4.6–6.2)
RBC # BLD AUTO: 5.06 M/UL (ref 4.6–6.2)
SODIUM SERPL-SCNC: 138 MMOL/L (ref 136–145)
SODIUM SERPL-SCNC: 139 MMOL/L (ref 136–145)
WBC # BLD AUTO: 11.51 K/UL (ref 3.9–12.7)
WBC # BLD AUTO: 8.09 K/UL (ref 3.9–12.7)
WBC # BLD AUTO: 9.36 K/UL (ref 3.9–12.7)
WBC # BLD AUTO: 9.53 K/UL (ref 3.9–12.7)
WBC # BLD AUTO: 9.53 K/UL (ref 3.9–12.7)

## 2023-12-22 PROCEDURE — 37211 THROMBOLYTIC ART THERAPY: CPT | Mod: LT | Performed by: SURGERY

## 2023-12-22 PROCEDURE — 25000003 PHARM REV CODE 250: Performed by: STUDENT IN AN ORGANIZED HEALTH CARE EDUCATION/TRAINING PROGRAM

## 2023-12-22 PROCEDURE — 37211 THROMBOLYTIC ART THERAPY: CPT | Mod: LT,,, | Performed by: SURGERY

## 2023-12-22 PROCEDURE — 51702 INSERT TEMP BLADDER CATH: CPT

## 2023-12-22 PROCEDURE — 63600175 PHARM REV CODE 636 W HCPCS

## 2023-12-22 PROCEDURE — 25000003 PHARM REV CODE 250

## 2023-12-22 PROCEDURE — 37220 HC ILIAC REVASC: CPT | Mod: LT | Performed by: SURGERY

## 2023-12-22 PROCEDURE — 80053 COMPREHEN METABOLIC PANEL: CPT

## 2023-12-22 PROCEDURE — 85730 THROMBOPLASTIN TIME PARTIAL: CPT | Mod: 91 | Performed by: STUDENT IN AN ORGANIZED HEALTH CARE EDUCATION/TRAINING PROGRAM

## 2023-12-22 PROCEDURE — C1769 GUIDE WIRE: HCPCS | Performed by: SURGERY

## 2023-12-22 PROCEDURE — 80053 COMPREHEN METABOLIC PANEL: CPT | Mod: 91 | Performed by: STUDENT IN AN ORGANIZED HEALTH CARE EDUCATION/TRAINING PROGRAM

## 2023-12-22 PROCEDURE — 63600175 PHARM REV CODE 636 W HCPCS: Performed by: SURGERY

## 2023-12-22 PROCEDURE — 25000003 PHARM REV CODE 250: Performed by: SURGERY

## 2023-12-22 PROCEDURE — 83735 ASSAY OF MAGNESIUM: CPT

## 2023-12-22 PROCEDURE — 75716 ARTERY X-RAYS ARMS/LEGS: CPT | Mod: 59 | Performed by: SURGERY

## 2023-12-22 PROCEDURE — 99153 MOD SED SAME PHYS/QHP EA: CPT | Performed by: SURGERY

## 2023-12-22 PROCEDURE — C1894 INTRO/SHEATH, NON-LASER: HCPCS | Performed by: SURGERY

## 2023-12-22 PROCEDURE — 36620 INSERTION CATHETER ARTERY: CPT

## 2023-12-22 PROCEDURE — 85025 COMPLETE CBC W/AUTO DIFF WBC: CPT | Mod: 91 | Performed by: STUDENT IN AN ORGANIZED HEALTH CARE EDUCATION/TRAINING PROGRAM

## 2023-12-22 PROCEDURE — C1887 CATHETER, GUIDING: HCPCS | Performed by: SURGERY

## 2023-12-22 PROCEDURE — C1725 CATH, TRANSLUMIN NON-LASER: HCPCS | Performed by: SURGERY

## 2023-12-22 PROCEDURE — 047D3Z1 DILATION OF LEFT COMMON ILIAC ARTERY USING DRUG-COATED BALLOON, PERCUTANEOUS APPROACH: ICD-10-PCS | Performed by: SURGERY

## 2023-12-22 PROCEDURE — 99152 MOD SED SAME PHYS/QHP 5/>YRS: CPT | Performed by: SURGERY

## 2023-12-22 PROCEDURE — 20000000 HC ICU ROOM

## 2023-12-22 PROCEDURE — 85384 FIBRINOGEN ACTIVITY: CPT | Mod: 91 | Performed by: STUDENT IN AN ORGANIZED HEALTH CARE EDUCATION/TRAINING PROGRAM

## 2023-12-22 PROCEDURE — 3E03317 INTRODUCTION OF OTHER THROMBOLYTIC INTO PERIPHERAL VEIN, PERCUTANEOUS APPROACH: ICD-10-PCS | Performed by: SURGERY

## 2023-12-22 PROCEDURE — 63600175 PHARM REV CODE 636 W HCPCS: Performed by: STUDENT IN AN ORGANIZED HEALTH CARE EDUCATION/TRAINING PROGRAM

## 2023-12-22 PROCEDURE — 99499 UNLISTED E&M SERVICE: CPT | Mod: ,,, | Performed by: ANESTHESIOLOGY

## 2023-12-22 PROCEDURE — 36620 INSERTION CATHETER ARTERY: CPT | Mod: 59,ICN,, | Performed by: ANESTHESIOLOGY

## 2023-12-22 PROCEDURE — 85610 PROTHROMBIN TIME: CPT | Performed by: STUDENT IN AN ORGANIZED HEALTH CARE EDUCATION/TRAINING PROGRAM

## 2023-12-22 PROCEDURE — 37220 PR REVASCULARIZE ILIAC ARTERY,ANGIOPLASTY, INITIAL VESSEL: CPT | Mod: LT,,, | Performed by: SURGERY

## 2023-12-22 PROCEDURE — 27201423 OPTIME MED/SURG SUP & DEVICES STERILE SUPPLY: Performed by: SURGERY

## 2023-12-22 PROCEDURE — 85025 COMPLETE CBC W/AUTO DIFF WBC: CPT

## 2023-12-22 PROCEDURE — 84100 ASSAY OF PHOSPHORUS: CPT | Mod: 91 | Performed by: STUDENT IN AN ORGANIZED HEALTH CARE EDUCATION/TRAINING PROGRAM

## 2023-12-22 PROCEDURE — 63600175 PHARM REV CODE 636 W HCPCS: Mod: JZ,JG | Performed by: STUDENT IN AN ORGANIZED HEALTH CARE EDUCATION/TRAINING PROGRAM

## 2023-12-22 PROCEDURE — 84100 ASSAY OF PHOSPHORUS: CPT

## 2023-12-22 PROCEDURE — 75716 ARTERY X-RAYS ARMS/LEGS: CPT | Mod: 26,59,, | Performed by: SURGERY

## 2023-12-22 PROCEDURE — 03HB33Z INSERTION OF INFUSION DEVICE INTO RIGHT RADIAL ARTERY, PERCUTANEOUS APPROACH: ICD-10-PCS | Performed by: SURGERY

## 2023-12-22 PROCEDURE — 85384 FIBRINOGEN ACTIVITY: CPT | Performed by: SURGERY

## 2023-12-22 PROCEDURE — 83735 ASSAY OF MAGNESIUM: CPT | Mod: 91 | Performed by: STUDENT IN AN ORGANIZED HEALTH CARE EDUCATION/TRAINING PROGRAM

## 2023-12-22 PROCEDURE — 85730 THROMBOPLASTIN TIME PARTIAL: CPT | Performed by: SURGERY

## 2023-12-22 RX ORDER — HEPARIN SODIUM 10000 [USP'U]/100ML
500 INJECTION, SOLUTION INTRAVENOUS CONTINUOUS
Status: DISCONTINUED | OUTPATIENT
Start: 2023-12-22 | End: 2023-12-23

## 2023-12-22 RX ORDER — HEPARIN SODIUM 1000 [USP'U]/ML
INJECTION, SOLUTION INTRAVENOUS; SUBCUTANEOUS
Status: DISCONTINUED | OUTPATIENT
Start: 2023-12-22 | End: 2023-12-22 | Stop reason: HOSPADM

## 2023-12-22 RX ORDER — FENTANYL CITRATE 50 UG/ML
INJECTION, SOLUTION INTRAMUSCULAR; INTRAVENOUS
Status: DISCONTINUED | OUTPATIENT
Start: 2023-12-22 | End: 2023-12-22 | Stop reason: HOSPADM

## 2023-12-22 RX ORDER — SODIUM CHLORIDE 9 MG/ML
INJECTION, SOLUTION INTRAVENOUS CONTINUOUS
Status: DISCONTINUED | OUTPATIENT
Start: 2023-12-22 | End: 2023-12-22

## 2023-12-22 RX ORDER — HEPARIN SOD,PORCINE/0.9 % NACL 1000/500ML
INTRAVENOUS SOLUTION INTRAVENOUS
Status: DISCONTINUED | OUTPATIENT
Start: 2023-12-22 | End: 2023-12-22 | Stop reason: HOSPADM

## 2023-12-22 RX ORDER — LABETALOL HCL 20 MG/4 ML
10 SYRINGE (ML) INTRAVENOUS EVERY 6 HOURS PRN
Status: DISCONTINUED | OUTPATIENT
Start: 2023-12-22 | End: 2024-01-02

## 2023-12-22 RX ORDER — PROMETHAZINE HYDROCHLORIDE 12.5 MG/1
25 TABLET ORAL EVERY 6 HOURS PRN
Status: DISCONTINUED | OUTPATIENT
Start: 2023-12-22 | End: 2024-01-05 | Stop reason: HOSPADM

## 2023-12-22 RX ORDER — DIPHENHYDRAMINE HCL 25 MG
25 CAPSULE ORAL ONCE
Status: COMPLETED | OUTPATIENT
Start: 2023-12-22 | End: 2023-12-22

## 2023-12-22 RX ORDER — HYDRALAZINE HYDROCHLORIDE 20 MG/ML
INJECTION INTRAMUSCULAR; INTRAVENOUS
Status: DISCONTINUED | OUTPATIENT
Start: 2023-12-22 | End: 2023-12-22 | Stop reason: HOSPADM

## 2023-12-22 RX ORDER — MIDAZOLAM HYDROCHLORIDE 1 MG/ML
INJECTION INTRAMUSCULAR; INTRAVENOUS
Status: DISCONTINUED | OUTPATIENT
Start: 2023-12-22 | End: 2023-12-22 | Stop reason: HOSPADM

## 2023-12-22 RX ORDER — HYDROXYZINE HYDROCHLORIDE 25 MG/1
25 TABLET, FILM COATED ORAL 3 TIMES DAILY PRN
Status: DISCONTINUED | OUTPATIENT
Start: 2023-12-22 | End: 2024-01-05 | Stop reason: HOSPADM

## 2023-12-22 RX ORDER — NICARDIPINE HYDROCHLORIDE 0.2 MG/ML
0-15 INJECTION INTRAVENOUS CONTINUOUS
Status: DISCONTINUED | OUTPATIENT
Start: 2023-12-22 | End: 2023-12-26

## 2023-12-22 RX ORDER — SODIUM CHLORIDE 9 MG/ML
INJECTION, SOLUTION INTRAVENOUS CONTINUOUS
Status: DISCONTINUED | OUTPATIENT
Start: 2023-12-22 | End: 2023-12-23

## 2023-12-22 RX ORDER — CEFAZOLIN SODIUM 1 G/3ML
INJECTION, POWDER, FOR SOLUTION INTRAMUSCULAR; INTRAVENOUS
Status: DISCONTINUED | OUTPATIENT
Start: 2023-12-22 | End: 2023-12-22 | Stop reason: HOSPADM

## 2023-12-22 RX ORDER — DIPHENHYDRAMINE HYDROCHLORIDE 50 MG/ML
INJECTION, SOLUTION INTRAMUSCULAR; INTRAVENOUS
Status: DISCONTINUED | OUTPATIENT
Start: 2023-12-22 | End: 2023-12-22 | Stop reason: HOSPADM

## 2023-12-22 RX ORDER — LIDOCAINE HYDROCHLORIDE 20 MG/ML
INJECTION, SOLUTION INFILTRATION; PERINEURAL
Status: DISCONTINUED | OUTPATIENT
Start: 2023-12-22 | End: 2023-12-22 | Stop reason: HOSPADM

## 2023-12-22 RX ORDER — HYDRALAZINE HYDROCHLORIDE 20 MG/ML
10 INJECTION INTRAMUSCULAR; INTRAVENOUS EVERY 6 HOURS PRN
Status: DISCONTINUED | OUTPATIENT
Start: 2023-12-22 | End: 2024-01-02

## 2023-12-22 RX ADMIN — HYDROMORPHONE HYDROCHLORIDE 0.5 MG: 1 INJECTION, SOLUTION INTRAMUSCULAR; INTRAVENOUS; SUBCUTANEOUS at 03:12

## 2023-12-22 RX ADMIN — PANTOPRAZOLE SODIUM 40 MG: 40 TABLET, DELAYED RELEASE ORAL at 05:12

## 2023-12-22 RX ADMIN — QUETIAPINE FUMARATE 25 MG: 25 TABLET ORAL at 09:12

## 2023-12-22 RX ADMIN — DIVALPROEX SODIUM 125 MG: 125 TABLET, DELAYED RELEASE ORAL at 09:12

## 2023-12-22 RX ADMIN — CEFAZOLIN 2 G: 2 INJECTION, POWDER, FOR SOLUTION INTRAMUSCULAR; INTRAVENOUS at 10:12

## 2023-12-22 RX ADMIN — SODIUM CHLORIDE, POTASSIUM CHLORIDE, SODIUM LACTATE AND CALCIUM CHLORIDE: 600; 310; 30; 20 INJECTION, SOLUTION INTRAVENOUS at 11:12

## 2023-12-22 RX ADMIN — LABETALOL HYDROCHLORIDE 10 MG: 5 INJECTION, SOLUTION INTRAVENOUS at 11:12

## 2023-12-22 RX ADMIN — HYDROMORPHONE HYDROCHLORIDE 0.5 MG: 1 INJECTION, SOLUTION INTRAMUSCULAR; INTRAVENOUS; SUBCUTANEOUS at 07:12

## 2023-12-22 RX ADMIN — HYDROXYZINE HYDROCHLORIDE 25 MG: 25 TABLET ORAL at 03:12

## 2023-12-22 RX ADMIN — CARVEDILOL 6.25 MG: 6.25 TABLET, FILM COATED ORAL at 05:12

## 2023-12-22 RX ADMIN — HYDROXYZINE HYDROCHLORIDE 25 MG: 25 TABLET ORAL at 09:12

## 2023-12-22 RX ADMIN — HYDROMORPHONE HYDROCHLORIDE 0.5 MG: 1 INJECTION, SOLUTION INTRAMUSCULAR; INTRAVENOUS; SUBCUTANEOUS at 04:12

## 2023-12-22 RX ADMIN — DIVALPROEX SODIUM 125 MG: 125 TABLET, DELAYED RELEASE ORAL at 05:12

## 2023-12-22 RX ADMIN — NICARDIPINE HYDROCHLORIDE 12.5 MG/HR: 0.2 INJECTION, SOLUTION INTRAVENOUS at 10:12

## 2023-12-22 RX ADMIN — HYDROMORPHONE HYDROCHLORIDE 0.5 MG: 1 INJECTION, SOLUTION INTRAMUSCULAR; INTRAVENOUS; SUBCUTANEOUS at 01:12

## 2023-12-22 RX ADMIN — HYDROMORPHONE HYDROCHLORIDE 0.5 MG: 1 INJECTION, SOLUTION INTRAMUSCULAR; INTRAVENOUS; SUBCUTANEOUS at 11:12

## 2023-12-22 RX ADMIN — HYDRALAZINE HYDROCHLORIDE 10 MG: 20 INJECTION, SOLUTION INTRAMUSCULAR; INTRAVENOUS at 10:12

## 2023-12-22 RX ADMIN — NICARDIPINE HYDROCHLORIDE 2.5 MG/HR: 0.2 INJECTION, SOLUTION INTRAVENOUS at 12:12

## 2023-12-22 RX ADMIN — HEPARIN SODIUM 500 UNITS/HR: 10000 INJECTION, SOLUTION INTRAVENOUS at 10:12

## 2023-12-22 RX ADMIN — Medication 6 MG: at 09:12

## 2023-12-22 RX ADMIN — HYDROMORPHONE HYDROCHLORIDE 0.5 MG: 1 INJECTION, SOLUTION INTRAMUSCULAR; INTRAVENOUS; SUBCUTANEOUS at 10:12

## 2023-12-22 RX ADMIN — DIPHENHYDRAMINE HYDROCHLORIDE 25 MG: 25 CAPSULE ORAL at 11:12

## 2023-12-22 RX ADMIN — NICARDIPINE HYDROCHLORIDE 7.5 MG/HR: 0.2 INJECTION, SOLUTION INTRAVENOUS at 05:12

## 2023-12-22 RX ADMIN — CEFAZOLIN 2 G: 2 INJECTION, POWDER, FOR SOLUTION INTRAMUSCULAR; INTRAVENOUS at 03:12

## 2023-12-22 RX ADMIN — HYDROCODONE BITARTRATE AND ACETAMINOPHEN 1 TABLET: 5; 325 TABLET ORAL at 03:12

## 2023-12-22 RX ADMIN — ALTEPLASE 1 MG/HR: 2.2 INJECTION, POWDER, LYOPHILIZED, FOR SOLUTION INTRAVENOUS at 10:12

## 2023-12-22 RX ADMIN — SODIUM CHLORIDE: 9 INJECTION, SOLUTION INTRAVENOUS at 11:12

## 2023-12-22 NOTE — ED NOTES
Pt informed he must stay in bed and use urinal at bedside due to pt being fall and bleeding risk. Pt refuses and continues to get out of bed and use the restroom.

## 2023-12-22 NOTE — ED NOTES
Pt reporting 9/10 pain to L foot. Only PRN tylenol ordered at the moment. MD notified. Awaiting further orders.

## 2023-12-22 NOTE — PLAN OF CARE
"      SICU PLAN OF CARE NOTE    Dx: Thrombosis of femoro-popliteal bypass graft    Shift Events: Admit from cath lab.  Cardene started.  Tpa and heparin gtt.  Neuro checks q1.      Goals of Care: -160    Neuro: AAO x4, Follows Commands, and Moves All Extremities    Vital Signs: BP (!) 155/74 (BP Location: Left arm, Patient Position: Lying)   Pulse 94   Temp 98.3 °F (36.8 °C) (Oral)   Resp (!) 38   Ht 5' 10" (1.778 m)   Wt 84 kg (185 lb 3 oz)   SpO2 (!) 92%   BMI 26.57 kg/m²     Cardiac: NSR     Respiratory: Nasal Cannula    Diet: Clear Liquid    Gtts: Heparin, Nicardipine, and MIVF, TPA    Urine Output: Urinary Catheter 800 cc/shift    Labs/Accuchecks: daily labs.    Skin: Pt skin dry and intact.  No skin breakdown during shift.  Sheath in place in right groin, tpa and heparin infusing.  No pulses in bilateral lower extremities, team aware.  Weight shifted q2 hours.    View flowsheet for full assessment details.    "

## 2023-12-22 NOTE — PLAN OF CARE
[facility] - Emergency Dept  Initial Discharge Assessment       Primary Care Provider: Nataliya Anderson MD    Admission Diagnosis: Thrombosis of femoro-popliteal bypass graft, initial encounter [T82.864U]    Admission Date: 12/21/2023  Expected Discharge Date:     Transition of Care Barriers: None    Payor: HU[employer] MANAGED MEDICARE / Plan: HU[employer] MEDICARE HMO / Product Type: Capitation /     Extended Emergency Contact Information  Primary Emergency Contact: Carolyn Gutierrez  Mobile Phone: [phone]  Relation: Sister  Secondary Emergency Contact: Holly Sheehan   [place]  Mobile Phone: [phone]  Relation: Sister  Preferred language: English   needed? No    Discharge Plan A: Home Health, Home with family  Discharge Plan B: Home with family      [facility] ST[id number] [place] [address] AT [facility] [address]  [address] [address] [address]  Phone: [phone] Fax: [phone]      Initial Assessment (most recent)       Adult Discharge Assessment - 12/21/23 2219          Discharge Assessment    Assessment Type Discharge Planning Assessment     Confirmed/corrected address, phone number and insurance Yes     Confirmed Demographics Correct on Facesheet     Source of Information patient     Does patient/caregiver understand observation status Yes     Communicated MYLENE with patient/caregiver Yes;Date not available/Unable to determine     Reason For Admission Thrombosis of femoro-popliteal bypass graft     People in Home alone     Facility Arrived From: n/a - home     Do you expect to return to your current living situation? Yes     Do you have help at home or someone to help you manage your care at home? No     Prior to hospitilization cognitive status: Alert/Oriented;No Deficits     Current cognitive status: Alert/Oriented;No Deficits     Walking or Climbing Stairs Difficulty yes     Walking or Climbing Stairs ambulation difficulty, requires equipment      Mobility Management Cane     Dressing/Bathing Difficulty no     Home Accessibility wheelchair accessible     Home Layout Able to live on 1st floor     Equipment Currently Used at Home cane, straight     Readmission within 30 days? No     Patient currently being followed by outpatient case management? No     Do you currently have service(s) that help you manage your care at home? No     Do you take prescription medications? Yes     Do you have prescription coverage? Yes     Coverage [employer] medicare HMO     Do you have any problems affording any of your prescribed medications? No     Is the patient taking medications as prescribed? yes     Who is going to help you get home at discharge? Sisters     How do you get to doctors appointments? family or friend will provide;public transportation     Are you on dialysis? No     Do you take coumadin? No     Discharge Plan A Home Health;Home with family     Discharge Plan B Home with family     DME Needed Upon Discharge  none     Discharge Plan discussed with: Patient     Transition of Care Barriers None        Physical Activity    On average, how many days per week do you engage in moderate to strenuous exercise (like a brisk walk)? 0 days     On average, how many minutes do you engage in exercise at this level? 0 min        Financial Resource Strain    How hard is it for you to pay for the very basics like food, housing, medical care, and heating? Not very hard        Housing Stability    In the last 12 months, was there a time when you were not able to pay the mortgage or rent on time? No     In the last 12 months, how many places have you lived? 1     In the last 12 months, was there a time when you did not have a steady place to sleep or slept in a shelter (including now)? No        Transportation Needs    In the past 12 months, has lack of transportation kept you from medical appointments or from getting medications? No     In the past 12 months, has lack of  transportation kept you from meetings, work, or from getting things needed for daily living? No        Food Insecurity    Within the past 12 months, you worried that your food would run out before you got the money to buy more. Never true     Within the past 12 months, the food you bought just didn't last and you didn't have money to get more. Never true        Stress    Do you feel stress - tense, restless, nervous, or anxious, or unable to sleep at night because your mind is troubled all the time - these days? Very much        Social Connections    In a typical week, how many times do you talk on the phone with family, friends, or neighbors? More than three times a week     How often do you get together with friends or relatives? Once a week     How often do you attend [employer] or Evangelical services? Never     Do you belong to any clubs or organizations such as [employer] groups, unions, fraternal or athletic groups, or school groups? No     How often do you attend meetings of the clubs or organizations you belong to? Never     Are you , , , , never , or living with a partner?         Alcohol Use    Q1: How often do you have a drink containing alcohol? Never     Q2: How many drinks containing alcohol do you have on a typical day when you are drinking? Patient does not drink     Q3: How often do you have six or more drinks on one occasion? Never                   Pt reports living in a [facility] that is handicap accessible. Pt reports living alone. Pt stated that he uses a cane to ambulate. Pt denied having HH, HD, or coumadin. Pt stated that he does not drive. Pt is independent with ADLs. Pt reports that he is interested in getting a    power scooter.    Discharge Plan A Home Health;Home with family    Discharge Plan B Home with family       No other discharge needs identified at this time.    Discharge Plan A and Plan B have been determined by review of patient's clinical status,  future medical and therapeutic needs, and coverage/benefits for post-acute care in coordination with multidisciplinary team members.     Dorene Cortes LMSW  Case Management [facility]  [phone]

## 2023-12-22 NOTE — PROCEDURES
"Jamari Huerta III is a 63 y.o. male patient.    Temp: 98.7 °F (37.1 °C) (23 0525)  Pulse: 68 (23 0525)  Resp: 20 (23 1040)  BP: (!) 191/94 (Pt reporting 10/10 pain. Will administer PRN pain meds) (23 0736)  SpO2: 95 % (23 0525)  Weight: 84 kg (185 lb 3 oz) (23 1257)  Height: 5' 10" (177.8 cm) (23 1257)  Mallampati Scale: Class II  ASA Classification: Class 2    Arterial Line    Date/Time: 2023 11:49 AM  Location procedure was performed: [facility] CRITICAL CARE SURGERY    Performed by: Doris Sotelo MD  Authorized by: Doris Sotelo MD  Assisting provider: Franc Cosme MD  Pre-op Diagnosis: hypertensive urgency  Post-operative diagnosis: hypertensive urgency  Consent Done: Yes  Consent: Written consent obtained.  Consent given by: patient  Patient understanding: patient states understanding of the procedure being performed  Patient consent: the patient's understanding of the procedure matches consent given  Procedure consent: procedure consent matches procedure scheduled  Relevant documents: relevant documents present and verified  Patient identity confirmed: , MRN and name  Preparation: Patient was prepped and draped in the usual sterile fashion.  Indications: multiple ABGs and hemodynamic monitoring  Location: right radial  Anesthesia: local infiltration    Anesthesia:  Local Anesthetic: lidocaine 1% without epinephrine  Anesthetic total: 0.5 mL  Needle gauge: 20  Number of attempts: 1  Complications: No  Estimated blood loss (mL): 0  Specimens: No  Implants: No  Post-procedure: dressing applied and line sutured  Post-procedure CMS: normal and unchanged  Patient tolerance: Patient tolerated the procedure well with no immediate complications          2023    "

## 2023-12-22 NOTE — ASSESSMENT & PLAN NOTE
Jamari Huerta III is a 63 y.o. yo male who is s/p LLE thrombolysis catheter on with Dr. Mitchell      Neuro/Psych:   -- Sedation: none  -- Pain: tylenol, dilaudid PRN             Cards:   -- HDS  -- -160      Pulm:   -- Goal O2 sat > 90%  -- Wean as able      Renal:  -- Keep marie for strict I/O   Recent Labs   Lab 12/21/23  1019 12/22/23  0349   BUN 20 19   CREATININE 0.72 0.9         FEN / GI:   -- Replace lytes as needed  -- Nutrition: NPO      ID:   -- Tm: afebrile  -- Abx: complete perioperative cefazolin 2g Q8H x 5 doses  Recent Labs   Lab 12/21/23  1019 12/22/23  0349   WBC 8.61 8.09        Heme/Onc:  -- H/H pre op 15.9  -- Daily CBC  -- Q4 CBC and fibrinogen  --- should fibrinogen <200 or decrease by half, inform vascular surgery fellow  Recent Labs   Lab 12/21/23  1019 12/21/23  1048 12/21/23  1048 12/21/23  1406 12/21/23  2115 12/22/23  0342 12/22/23  0349   HGB 15.9  --   --   --   --   --  15.7     --   --   --   --   --  161   APTT  --  28.2   < > 99.7* 50.0* 52.2*  --    INR  --  1.1  --  1.1  --   --   --     < > = values in this interval not displayed.         Endo:   -- Gluc goal 140-180      PPx:   Feeding: NPO  Analgesia/Sedation: tylenol, dilaudid PRN  Thromboembolic prevention: heparin via thrombolysis catheter  HOB >30: flat  Stress Ulcer ppx: not indicated  Glucose control: Critical care goal 140-180 g/dl, SSI  Bowel reg: n/a  Invasive Lines/Drains/Airway: Lata R rad art line, PIV  Deescalation: will reevaluate        Dispo/Code Status/Palliative:   -- SICU / Full Code

## 2023-12-22 NOTE — H&P
[facility] - [facility] Int[facility]  Critical Care - Surgery  History & Physical    Patient Name: Jamari Huerta III  MRN: [id number]  Admission Date: 12/21/2023  Code Status: Full Code  Attending Physician: NATALIE Mitchell II, MD   Primary Care Provider: Nataliya Anderson MD   Principal Problem: Thrombosis of femoro-popliteal bypass graft    Subjective:     HPI:  Jamari Huerta III is a 63 y.o. male w/ a significant PMHx of CAD, hx CVA 6 mo prior with residual left-sided deficits, CKD, LLE DVT in 2002, not currently on anticoagulation who is a transfer from [facility] with a fem pop bypass occlusion in [facility].     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 Pt reports that around midnight last night he had sudden 10/10 pain that woke him up from his sleep. He states that over the next couple of hours his pain had completely resolved. Upon interview, pt wanted to use the restroom and walked to the restroom himself. He reported some calf pain and shin pain afterwards on his walk back to the bed (~10ft). The calf and shin pain has been happening for months. He reports that he has been having decreasing sensation on the left compared to the right for months. He admits that his left lower extremity is more swollen than the right. Admits that his left leg is slowly becoming weaker over months. Pt reports that he takes ASA and continues to smoke. Has a small wound in the left great toe, reports he was trying to cut his nails with scissors and cut his skin, this has been healing and only happened a week ago.    The patient presents to the SICU s/p LLE thrombolysis catheter placement, and PTA of L Iliac Artery  with Dr. Mitchell on 12/22/2023. TPA 1mg/hr and heparin 500 un/hr are infusing through the catheter. At 1600, TPA dose will be adjusted to 0.5 mg/hr. Checking Q4 fibrinogen levels. He is to lay flat until return to OR tomorrow with vascular surgery.     Hospital/ICU Course:  No notes on file    Follow-up For: Procedure(s)  (LRB):  Thrombolysis-peripheral (Left)  PTA, Iliac Artery    Post-Operative Day: Day of Surgery     Past Medical History:   Diagnosis Date    Abnormal PFT     Cardiac murmur     Cardiomegaly     CKD (chronic kidney disease)     Coronary artery disease     Depression     DVT (deep venous thrombosis)     left lower leg    Dyspnea     Edema     Episode of transient neurologic symptoms 05/14/2019    Essential hypertension 04/15/2014    GI bleed     Heart attack 06/01/2023    approxiamate date    High cholesterol     Hypertension     Mixed hyperlipidemia 04/15/2014    PVD (peripheral vascular disease)     Stroke 06/01/2023    aprroximate date    Thrombotic stroke involving right middle cerebral artery 05/14/2019       Past Surgical History:   Procedure Laterality Date    AORTIC VALVE SURGERY      1982 -    VASCULAR SURGERY      left lower leg       Review of patient's allergies indicates:   Allergen Reactions    Bupropion Swelling    Zyban [bupropion hcl (smoking deter)] Swelling    Morphine Itching and Rash       Family History       Problem Relation (Age of Onset)    Diabetes Mother          Tobacco Use    Smoking status: Every Day     Current packs/day: 1.00     Average packs/day: 1 pack/day for 55.0 years (55.0 ttl pk-yrs)     Types: Cigarettes     Start date: 1/1/1969    Smokeless tobacco: Never   Substance and Sexual Activity    Alcohol use: No     Comment: Hx of drinking alot when younger    Drug use: No    Sexual activity: Not on file      Review of Systems   Constitutional:  Negative for chills and fever.   Eyes:  Negative for visual disturbance.   Respiratory:  Negative for shortness of breath.    Cardiovascular:  Negative for chest pain.   Gastrointestinal:  Negative for abdominal pain, blood in stool, constipation, diarrhea, nausea and vomiting.   Genitourinary:  Negative for dysuria and hematuria.   Musculoskeletal:  Negative for back pain.        Groin pain   Allergic/Immunologic: Negative for environmental  allergies.   Neurological:  Negative for dizziness, weakness and light-headedness.     Objective:     Vital Signs (Most Recent):  Temp: 98.7 °F (37.1 °C) (12/22/23 0525)  Pulse: 68 (12/22/23 0525)  Resp: 20 (12/22/23 1040)  BP: (!) 191/94 (Pt reporting 10/10 pain. Will administer PRN pain meds) (12/22/23 0736)  SpO2: 95 % (12/22/23 0525) Vital Signs (24h Range):  Temp:  [98 °F (36.7 °C)-98.9 °F (37.2 °C)] 98.7 °F (37.1 °C)  Pulse:  [62-86] 68  Resp:  [11-20] 20  SpO2:  [95 %-100 %] 95 %  BP: (138-191)/(71-94) 191/94     Weight: 84 kg (185 lb 3 oz)  Body mass index is 26.57 kg/m².      Intake/Output Summary (Last 24 hours) at 12/22/2023 1045  Last data filed at 12/22/2023 0550  Gross per 24 hour   Intake --   Output 150 ml   Net -150 ml          Physical Exam  Constitutional:       General: He is not in acute distress.     Appearance: Normal appearance. He is not ill-appearing.   Cardiovascular:      Rate and Rhythm: Normal rate and regular rhythm.   Pulmonary:      Effort: Pulmonary effort is normal. No respiratory distress.      Breath sounds: Normal breath sounds.   Abdominal:      General: Abdomen is flat.      Palpations: Abdomen is soft.   Musculoskeletal:         General: Normal range of motion.      Comments: Known left sided residual deficit from prior CVA  Left groin access site  Heparin and TPA infusing catheters in place   Skin:     General: Skin is warm and dry.   Neurological:      General: No focal deficit present.      Mental Status: He is alert and oriented to person, place, and time. Mental status is at baseline.            Vents:       Lines/Drains/Airways       Peripheral Intravenous Line  Duration                  Peripheral IV - Single Lumen 12/22/23 0023 20 G Anterior;Right Upper Arm <1 day         Peripheral IV - Single Lumen 12/22/23 0534 20 G Left;Posterior Hand <1 day         Sheath 12/22/23 0900 Right anterior;proximal <1 day                    Significant Labs:    CBC/Anemia  Profile:  Recent Labs   Lab 12/21/23  1019 12/22/23  0349   WBC 8.61 8.09   HGB 15.9 15.7   HCT 49.2 47.0    161   MCV 93 94   RDW 15.1* 15.5*        Chemistries:  Recent Labs   Lab 12/21/23  1019 12/22/23  0349    138   K 4.3 4.4    106   CO2 26 22*   BUN 20 19   CREATININE 0.72 0.9   CALCIUM 9.5 9.0   ALBUMIN 3.8 3.1*   PROT 7.1 6.6   BILITOT 0.8 0.5   ALKPHOS 80 91   ALT 36 29   AST 47 30   MG  --  1.8   PHOS  --  3.6           Significant Imaging: I have reviewed all pertinent imaging results/findings within the past 24 hours.  Assessment/Plan:     Cardiac/Vascular  * Thrombosis of femoro-popliteal bypass graft  Jamari Huerta III is a 63 y.o. yo male who is s/p LLE thrombolysis catheter on with Dr. Mitchell      Neuro/Psych:   -- Sedation: none  -- Pain: tylenol, dilaudid PRN             Cards:   -- HDS  -- -160      Pulm:   -- Goal O2 sat > 90%  -- Wean as able      Renal:  -- Keep marie for strict I/O   Recent Labs   Lab 12/21/23  1019 12/22/23  0349   BUN 20 19   CREATININE 0.72 0.9         FEN / GI:   -- Replace lytes as needed  -- Nutrition: NPO      ID:   -- Tm: afebrile  -- Abx: complete perioperative cefazolin 2g Q8H x 5 doses  Recent Labs   Lab 12/21/23  1019 12/22/23  0349   WBC 8.61 8.09        Heme/Onc:  -- H/H pre op 15.9  -- Daily CBC  -- Q4 CBC and fibrinogen  --- should fibrinogen <200 or decrease by half, inform vascular surgery fellow  Recent Labs   Lab 12/21/23  1019 12/21/23  1048 12/21/23  1048 12/21/23  1406 12/21/23  2115 12/22/23  0342 12/22/23  0349   HGB 15.9  --   --   --   --   --  15.7     --   --   --   --   --  161   APTT  --  28.2   < > 99.7* 50.0* 52.2*  --    INR  --  1.1  --  1.1  --   --   --     < > = values in this interval not displayed.         Endo:   -- Gluc goal 140-180      PPx:   Feeding: NPO  Analgesia/Sedation: tylenol, dilaudid PRN  Thromboembolic prevention: heparin via thrombolysis catheter  HOB >30: flat  Stress Ulcer ppx: not  indicated  Glucose control: Critical care goal 140-180 g/dl, SSI  Bowel reg: n/a  Invasive Lines/Drains/Airway: Guido R rad art line, PIV  Deescalation: will reevaluate        Dispo/Code Status/Palliative:   -- SICU / Full Code             Critical care was time spent personally by me on the following activities: development of treatment plan with patient or surrogate and bedside caregivers, discussions with consultants, evaluation of patient's response to treatment, examination of patient, ordering and performing treatments and interventions, ordering and review of laboratory studies, ordering and review of radiographic studies, pulse oximetry, re-evaluation of patient's condition.  This critical care time did not overlap with that of any other provider or involve time for any procedures.     Kush Rider MD  Critical Care - Surgery  [facility]

## 2023-12-22 NOTE — NURSING
Nurses Note -- 4 Eyes      12/22/2023   2:32 PM      Skin assessed during: Admit      [x] No Altered Skin Integrity Present    [x]Prevention Measures Documented      [] Yes- Altered Skin Integrity Present or Discovered   [] LDA Added if Not in Epic (Describe Wound)   [] New Altered Skin Integrity was Present on Admit and Documented in LDA   [] Wound Image Taken    Wound Care Consulted? No    Attending Nurse:  DENICE Werner    Second RN/Staff Member:  DENICE Smith

## 2023-12-22 NOTE — NURSING
Pt up from cath lab with anesthesia.  Pt connected to wall monitor upon arrival.  Charge nurse, RT, and CTS resident notified of pt arrival.  Upon assessment, unable to palpate or doppler bilateral posterior tibial and dorsalis pedis pulses.  Dr. Sow at bedside, notified about pulses not being found in bilateral lower extremities.  Pt awake, alert and oriented.  Able to moves toes in both feet.  Dr. Sow aware of all findings.  If pt experiences altered mental status, notify MD and obtain STAT CT scan.  Pt not experiencing any numbness or tingling in lower extremities.  Labs drawn, awaiting new orders.

## 2023-12-22 NOTE — HPI
Jamari Huerta III is a 63 y.o. male w/ a significant PMHx of CAD, hx CVA 6 mo prior with residual left-sided deficits, CKD, LLE DVT in 2002, not currently on anticoagulation who is a transfer from [facility] with a fem pop bypass occlusion in [facility].     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 Pt reports that around midnight last night he had sudden 10/10 pain that woke him up from his sleep. He states that over the next couple of hours his pain had completely resolved. Upon interview, pt wanted to use the restroom and walked to the restroom himself. He reported some calf pain and shin pain afterwards on his walk back to the bed (~10ft). The calf and shin pain has been happening for months. He reports that he has been having decreasing sensation on the left compared to the right for months. He admits that his left lower extremity is more swollen than the right. Admits that his left leg is slowly becoming weaker over months. Pt reports that he takes ASA and continues to smoke. Has a small wound in the left great toe, reports he was trying to cut his nails with scissors and cut his skin, this has been healing and only happened a week ago.    The patient presents to the SICU s/p LLE thrombolysis catheter placement, and PTA of L Iliac Artery  with Dr. Mitchell on 12/22/2023. TPA 1mg/hr and heparin 500 un/hr are infusing through the catheter. At 1600, TPA dose will be adjusted to 0.5 mg/hr. Checking Q4 fibrinogen levels. He is to lay flat until return to OR tomorrow with vascular surgery.

## 2023-12-22 NOTE — ED NOTES
Aptt resulted at 52.2. Second therapeutic range result, Heparin gtts remains at 18 units/kg/hr. Next ptt to be drawn with morning labs.

## 2023-12-22 NOTE — BRIEF OP NOTE
[facility] Lab  Brief Operative Note    SUMMARY     Surgery Date: 12/22/2023     Surgeon(s) and Role:     * NATALIE Mitchell II, MD - Primary     * Denny Sow MD - Fellow    Assisting Surgeon: None    Pre-op Diagnosis:  Thrombosis of femoro-popliteal bypass graft, initial encounter [T82.868A]    Post-op Diagnosis:  Post-Op Diagnosis Codes:     * Thrombosis of femoro-popliteal bypass graft, initial encounter [T82.868A]    Procedure(s) (LRB):  Thrombolysis-peripheral (Left)  PTA, Iliac Artery (7x60mm Ultraverse)  50cm infusion length Aminah Garrett    Anesthesia: Local MAC    Implants:  * No implants in log *    Operative Findings: occluded fem-AK Pop bypass, chronic tibial occlusions, recon of distal AT only.     Estimated Blood Loss: 30cc         Specimens:   Specimen (24h ago, onward)      None            [id number]

## 2023-12-22 NOTE — ED NOTES
Heparin gtts paused due to last ptt resulting at 99.7 at 14:06. New ptt sent to lab. Awaiting results. Provider aware.

## 2023-12-22 NOTE — ANESTHESIA PREPROCEDURE EVALUATION
[facility]  Anesthesia Pre-Operative Evaluation     Patient Name: Jamari Huerta III  YOB: 1960  MRN: [id number]  [facility]: [id number]       Admit Date: 12/21/2023   Admit Team: Networked reference to record PCT   Hospital Day: 2  Date of Procedure: 12/23/2023  Anesthesia: Local MAC Procedure: Procedure(s) (LRB):  Angiogram Extremity Unilateral (Left)  Pre-Operative Diagnosis: Critical lower limb ischemia [I70.229]  Proceduralist:Surgeon(s) and Role:     * Wes Cobian MD - Primary  Code Status: Full Code   Advanced Directive: <no information>  Isolation Precautions: No active isolations  Capacity: Full capacity       SUBJECTIVE:     Pre-operative evaluation for Procedure(s) (LRB):  Angiogram Extremity Unilateral (Left)  12/22/2023  Hospital LOS: 1 days  ICU LOS: 50m    Jamari Huerta III is a 63 y.o. male w/ a significant PMHx of CAD w MI 6/1/23 , HFrEF, COPD,chronic bronchitis, neurofibromatosis, hx CVA 6 mo prior with residual left-sided deficits, CKD, LLE DVT in 2002, transferred from [facility] with fem pop bypass occlusion in LLE.    Now s/p LLE thrombolysis catheter placement, and PTA of L Iliac Artery 12/22. TPA 1mg/hr and heparin 500 un/hr are infusing through the catheter. At 1600, TPA dose will be adjusted to 0.5 mg/hr. Checking Q4 fibrinogen levels.    he has a current medication list which includes the following long-term medication(s): aspirin, atorvastatin, carvedilol, divalproex, and quetiapine.     Patient now presents for the above procedure(s).    TTE 7/10/2023:  -Left Ventricle: The left ventricle is not well visualized. Normal left ventricular cavity   size. Mildly depressed left ventricular systolic function. LVEF 40 - 45%.   -Right Ventricle: Borderline right ventricular cavity size.   -Left Atrium: Mildly dilated left atrium.   -Right Atrium: The right atrium is normal in size.   -Atrial Septum: Agitated saline bubble study is negative for intracardiac shunt.    -Mitral Valve: Normal mitral valve structure. No mitral valve stenosis.   -Aortic Valve: The aortic valve has a tricuspid configuration. No aortic valve stenosis.   -Tricuspid Valve: Normal tricuspid valve structure.   -Pulmonic Valve: Normal pulmonic valve structure.   -Pericardium: Normal pericardium without evidence of pericardial effusion.   -Aorta: Normal aortic root size.   -IVC: Inferior vena cava not visualized.     HALEY 7/12/2023:  1. The left ventricle is normal in size and function with estimated   ejection fraction of 60-65%.   2. The right ventricle is normal in size and function.   3. The left atrium is normal in size.  The left atrial appendage was well   visualized with no evident thrombus.  The right atrium appeared normal in   size.  There does appear to be a partial right atrial septum   4. Agitated saline contrast study was negative for intracardiac shunt.    Interatrial septum appears to be intact   5. There is no evident pericardial effusion.   6. The proximal aortic root and descending aorta appear normal in size.   7. The aortic valve is normal with a trileaflet configuration.   8. The mitral valve leaflets appear normal.  There is no significant   mitral regurgitation.   9. The tricuspid valve leaflets appear normal with mild insufficiency.   10. The pulmonic valve appears normal.     NPO Status:     LDA:        Peripheral IV - Single Lumen 12/22/23 0023 20 G Anterior;Right Upper Arm (Active)   Site Assessment Clean;Dry;Intact 12/22/23 0024   Extremity Assessment Distal to IV No abnormal discoloration;No redness;No swelling 12/22/23 0024   Dressing Status Clean;Dry;Intact 12/22/23 0024   Dressing Intervention Integrity maintained 12/22/23 0024   Number of days: 0            Peripheral IV - Single Lumen 12/22/23 0534 20 G Left;Posterior Hand (Active)   Site Assessment Clean;Dry;Intact;No redness;No swelling 12/22/23 0534   Extremity Assessment Distal to IV No abnormal discoloration;No  redness;No swelling;No warmth 12/22/23 0534   Line Status Blood return noted;Flushed;Saline locked 12/22/23 0534   Dressing Status Clean;Dry;Intact 12/22/23 0534   Dressing Intervention First dressing 12/22/23 0534   Number of days: 0            Sheath 12/22/23 0900 Right anterior;proximal (Active)   Number of days: 0       Vent/Oxygen: None documented     Drips:    sodium chloride 0.9% 15 mL/hr at 12/22/23 1104    alteplase (CATHFlo ACtivase) 12.5 mg in sodium chloride 0.9% 250 mL infusion 1 mg/hr (12/22/23 1044)    heparin (porcine) in 5 % dex 500 Units/hr (12/22/23 1034)    lactated ringers 75 mL/hr at 12/21/23 1829       Previous Airway: None documented.    Allergies:  Review of patient's allergies indicates:   Allergen Reactions    Bupropion Swelling    Zyban [bupropion hcl (smoking deter)] Swelling    Morphine Itching and Rash       Medications:     Current Outpatient Medications   Medication Instructions    aspirin 81 mg, Oral, Daily    atorvastatin (LIPITOR) 80 mg, Oral, Daily    carvediloL (COREG) 6.25 mg, Oral, 2 times daily with meals    divalproex (DEPAKOTE) 125 mg, Oral, 3 times daily    pantoprazole (PROTONIX) 40 mg, Oral, Daily    QUEtiapine (SEROQUEL) 25 MG Tab 1 tab at bedtime    sacubitriL-valsartan (ENTRESTO) 24-26 mg per tablet 1 tablet, Oral, Daily     Inpatient Medications:   aspirin  81 mg Oral Daily    atorvastatin  80 mg Oral Daily    carvediloL  6.25 mg Oral BID WM    diphenhydrAMINE  25 mg Oral Once    divalproex  125 mg Oral TID    LIDOcaine (PF) 10 mg/ml (1%)  1 mL Other Once    pantoprazole  40 mg Oral Daily    QUEtiapine  25 mg Oral QHS     Antibiotics (From admission, onward)      None          VTE Risk Mitigation (From admission, onward)           Ordered     heparin 25,000 units in dextrose 5% 250 mL (100 units/mL) infusion (heparin infusion - NO NOMOGRAM)  Continuous         12/22/23 0959     Reason for no Mechanical VTE Prophylaxis  Once        Question:  Reasons:  Answer:  IV  Heparin within 24 hrs. Pre or Post-Op    12/21/23 1507                    History:     Active Hospital Problems    Diagnosis  POA    *Thrombosis of femoro-popliteal bypass graft [T82.868A]  Yes     SFA to above knee pop bypass        Resolved Hospital Problems   No resolved problems to display.     Medical History:  Past Medical History:   Diagnosis Date    Abnormal PFT     Cardiac murmur     Cardiomegaly     CKD (chronic kidney disease)     Coronary artery disease     Depression     DVT (deep venous thrombosis)     left lower leg    Dyspnea     Edema     Episode of transient neurologic symptoms 05/14/2019    Essential hypertension 04/15/2014    GI bleed     Heart attack 06/01/2023    approxiamate date    High cholesterol     Hypertension     Mixed hyperlipidemia 04/15/2014    PVD (peripheral vascular disease)     Stroke 06/01/2023    aprroximate date    Thrombotic stroke involving right middle cerebral artery 05/14/2019     Surgical History:    has a past surgical history that includes Vascular surgery and Aortic valve surgery.   Social History:    has no history on file for sexual activity.  reports that he has been smoking cigarettes. He started smoking about 55 years ago. He has a 55.0 pack-year smoking history. He has never used smokeless tobacco. He reports that he does not drink alcohol and does not use drugs.    OBJECTIVE:   Vital Signs Range (Last 24H):  Temp:  [36.7 °C (98 °F)-37.2 °C (98.9 °F)]   Pulse:  [62-86]   Resp:  [11-20]   BP: (138-191)/(73-94)   SpO2:  [95 %-100 %]   Lab Results   Component Value Date    WBC 9.53 12/22/2023    WBC 9.53 12/22/2023    HGB 15.5 12/22/2023    HGB 15.5 12/22/2023    HCT 45.6 12/22/2023    HCT 45.6 12/22/2023     (L) 12/22/2023     (L) 12/22/2023     12/22/2023    K 4.1 12/22/2023     12/22/2023    CREATININE 0.8 12/22/2023    BUN 17 12/22/2023    CO2 22 (L) 12/22/2023    GLU 93 12/22/2023    CALCIUM 8.9 12/22/2023    MG 1.8 12/22/2023    PHOS  "3.5 12/22/2023    ALKPHOS 89 12/22/2023    ALT 28 12/22/2023    AST 30 12/22/2023    ALBUMIN 3.1 (L) 12/22/2023    INR 1.1 12/21/2023    APTT 52.2 (H) 12/22/2023    HGBA1C 5.8 07/09/2023     (H) 12/21/2023    CPKMB 0.7 05/15/2019    TROPONINI 0.03 07/09/2023    MB 1.5 05/15/2019     Recent Labs     12/21/23  1019 12/21/23  1048 12/21/23  1406 12/22/23  0349 12/22/23  1023   WBC 8.61  --   --  8.09 9.53  9.53   HGB 15.9  --   --  15.7 15.5  15.5   HCT 49.2  --   --  47.0 45.6  45.6     --   --  161 133*  133*     --   --  138 139   K 4.3  --   --  4.4 4.1   CREATININE 0.72  --   --  0.9 0.8     --   --  96 93   INR  --  1.1 1.1  --   --      No results for input(s): "PH", "PCO2", "PO2", "HCO3", "POCSATURATED", "BE" in the last 72 hours.   No LMP for male patient.    Please see Results Review for additional labs & imaging.     EKG:   Results for orders placed or performed during the hospital encounter of 12/21/23   EKG 12-lead    Collection Time: 12/21/23 11:09 AM    Narrative    Test Reason : I99.9,    Vent. Rate : 063 BPM     Atrial Rate : 063 BPM     P-R Int : 184 ms          QRS Dur : 094 ms      QT Int : 420 ms       P-R-T Axes : 057 014 069 degrees     QTc Int : 429 ms    Normal sinus rhythm  Septal infarct ,age undetermined  Abnormal ECG  When compared with ECG of 14-MAY-2019 22:17,  Septal infarct is now Present  T wave inversion now evident in Lateral leads    Referred By: [email or web address]   SELF           Confirmed By:        ECHO:  See subjective, if available.    ASSESSMENT/PLAN:       Pre-op Assessment    I have reviewed the Patient Summary Reports.     I have reviewed the Nursing Notes. I have reviewed the NPO Status.   I have reviewed the Medications.     Review of Systems  Anesthesia Hx:  No problems with previous Anesthesia             Denies Family Hx of Anesthesia complications.    Denies Personal Hx of Anesthesia complications.                    Social:  Smoker, No Alcohol " Use       Hematology/Oncology:       -- Anemia:                                  EENT/Dental:  EENT/Dental Normal           Cardiovascular:     Hypertension Valvular problems/Murmurs Past MI CAD     Denies Dysrhythmias.   CHF   PVD hyperlipidemia MASTERS                            Pulmonary:   COPD  Denies Asthma.  Shortness of breath   Denies Sleep Apnea.                Renal/:  Chronic Renal Disease, CKD                Hepatic/GI:      Denies GERD. Denies Liver Disease.            Neurological:   CVA    Denies Seizures.    neurofibromatosis                            Endocrine:  Denies Diabetes.           Psych:  Psychiatric History  depression                Physical Exam  General: Well nourished, Cooperative, Alert and Oriented    Airway:  Mallampati: II   Mouth Opening: Normal  TM Distance: Normal  Tongue: Normal  Neck ROM: Normal ROM    Dental:  Periodontal disease  Significant periodontal disease, missing 2-3 teeth total      Anesthesia Plan  Type of Anesthesia, risks & benefits discussed:    Anesthesia Type: MAC, Gen Natural Airway, Gen ETT  Intra-op Monitoring Plan: Standard ASA Monitors  Post Op Pain Control Plan: multimodal analgesia and IV/PO Opioids PRN  Induction:  IV  Airway Plan: Direct and Video, Post-Induction  Informed Consent: Informed consent signed with the Patient and all parties understand the risks and agree with anesthesia plan.  All questions answered.   ASA Score: 4  Day of Surgery Review of History & Physical: H&P Update referred to the surgeon/provider.    Ready For Surgery From Anesthesia Perspective.     .

## 2023-12-22 NOTE — OP NOTE
[facility]  Operative Note     SUMMARY      Surgery Date: 12/22/2023      Surgeon(s) and Role:     * NATALIE Mitchell II, MD - Primary     * Denny Sow MD - Fellow     Assisting Surgeon: None     Pre-op Diagnosis:  Thrombosis of femoro-popliteal bypass graft, initial encounter [T82.868A]     Post-op Diagnosis:  Post-Op Diagnosis Codes:     * Thrombosis of femoro-popliteal bypass graft, initial encounter [T82.868A]     Procedure(s) (LRB):  Thrombolysis-peripheral (Left)  PTA, Iliac Artery (7x60mm Ultraverse)  50cm infusion length Aminah Garrett     Anesthesia: Local MAC     Implants:  * No implants in log *     Operative Findings: occluded fem-AK Pop bypass, chronic tibial occlusions, recon of distal AT only.      Complications: None    Estimated Blood Loss: 30cc         Specimens:   Specimen (24h ago, onward)        None             Indications:   63 year old male with history of peripheral artery disease and left fem to above knee pop bypass who presented to the Emergency department  with multiple weeks of left leg pain, found to have occlusion of his bypass.  Patient was consented for angiogram, possible thrombolysis catheter placement for treatment.     Procedure Details:  After obtaining signed consent for the above procedure, the patient was brought to the cath lab and placed in supine position.  Sedation was provided by anesthesia, preoperative antibiotics were administered.  The surgical time-out was performed confirming patient identity, laterality, and procedure.  We started by accessing the right common femoral artery under ultrasound guidance with a micropuncture needle followed by 0.018 in wire and microcatheter.  A 0.035 in wire was advanced into the infrarenal aorta and the microcatheter was exchanged for a 5 Stateless sheath.  An Omni catheter was advanced into the infrarenal aorta and an aortogram pelvic angiogram was performed.  This did identify high-grade stenosis proximally  50% in the left common iliac stent.  The Omni flush was used to select the left common iliac in the wire was advanced down to the superficial femoral artery origin.  The catheter was then advanced to the left femoral head and a left lower extremity angiogram was performed.  This identified a patent profunda artery with collaterals filling down to a very distal AT reconstitution.  Based on these findings decision was made to treat.  Patient was administered 5000 units of IV heparin.  Using a Glide catheter, the thrombosed graft was selected and the wire was advanced down to the level of the knee with the help of a Seeker catheter.  Fluoroscopy and angiogram confirmed intraluminal position.  The 5 Armenian sheath was exchanged for a 6 Armenian 45 cm sheath.  The left common iliac InStent stenosis was treated with a 7 x 60 mm Ultraverse balloon with complete resolution.  The sheath was then advanced to the femoral head.  A Cragg neck Gerry 5 Armenian catheter with 50 cm treatment length was then advanced to the below-knee popliteal artery.  2 mg of tPA was infused.  The sheath was sewn into place and sterile dressings applied.  TPA was infused through the catheter and heparin through the sheath patient tolerated the procedure well and was transported to the ICU in good condition.      Dr. Mitchell was present for critical portions of the procedure.    Denny Sow  Vascular Surgery Fellow, PGY-7  [phone]     NATALIE Mitchell II, MD, [facility]  Vascular Surgery  [facility]

## 2023-12-22 NOTE — SUBJECTIVE & OBJECTIVE
Follow-up For: Procedure(s) (LRB):  Thrombolysis-peripheral (Left)  PTA, Iliac Artery    Post-Operative Day: Day of Surgery     Past Medical History:   Diagnosis Date    Abnormal PFT     Cardiac murmur     Cardiomegaly     CKD (chronic kidney disease)     Coronary artery disease     Depression     DVT (deep venous thrombosis)     left lower leg    Dyspnea     Edema     Episode of transient neurologic symptoms 05/14/2019    Essential hypertension 04/15/2014    GI bleed     Heart attack 06/01/2023    approxiamate date    High cholesterol     Hypertension     Mixed hyperlipidemia 04/15/2014    PVD (peripheral vascular disease)     Stroke 06/01/2023    aprroximate date    Thrombotic stroke involving right middle cerebral artery 05/14/2019       Past Surgical History:   Procedure Laterality Date    AORTIC VALVE SURGERY      1982 -    VASCULAR SURGERY      left lower leg       Review of patient's allergies indicates:   Allergen Reactions    Bupropion Swelling    Zyban [bupropion hcl (smoking deter)] Swelling    Morphine Itching and Rash       Family History       Problem Relation (Age of Onset)    Diabetes Mother          Tobacco Use    Smoking status: Every Day     Current packs/day: 1.00     Average packs/day: 1 pack/day for 55.0 years (55.0 ttl pk-yrs)     Types: Cigarettes     Start date: 1/1/1969    Smokeless tobacco: Never   Substance and Sexual Activity    Alcohol use: No     Comment: Hx of drinking alot when younger    Drug use: No    Sexual activity: Not on file      Review of Systems   Constitutional:  Negative for chills and fever.   Eyes:  Negative for visual disturbance.   Respiratory:  Negative for shortness of breath.    Cardiovascular:  Negative for chest pain.   Gastrointestinal:  Negative for abdominal pain, blood in stool, constipation, diarrhea, nausea and vomiting.   Genitourinary:  Negative for dysuria and hematuria.   Musculoskeletal:  Negative for back pain.        Groin pain    Allergic/Immunologic: Negative for environmental allergies.   Neurological:  Negative for dizziness, weakness and light-headedness.     Objective:     Vital Signs (Most Recent):  Temp: 98.7 °F (37.1 °C) (12/22/23 0525)  Pulse: 68 (12/22/23 0525)  Resp: 20 (12/22/23 1040)  BP: (!) 191/94 (Pt reporting 10/10 pain. Will administer PRN pain meds) (12/22/23 0736)  SpO2: 95 % (12/22/23 0525) Vital Signs (24h Range):  Temp:  [98 °F (36.7 °C)-98.9 °F (37.2 °C)] 98.7 °F (37.1 °C)  Pulse:  [62-86] 68  Resp:  [11-20] 20  SpO2:  [95 %-100 %] 95 %  BP: (138-191)/(71-94) 191/94     Weight: 84 kg (185 lb 3 oz)  Body mass index is 26.57 kg/m².      Intake/Output Summary (Last 24 hours) at 12/22/2023 1045  Last data filed at 12/22/2023 0550  Gross per 24 hour   Intake --   Output 150 ml   Net -150 ml          Physical Exam  Constitutional:       General: He is not in acute distress.     Appearance: Normal appearance. He is not ill-appearing.   Cardiovascular:      Rate and Rhythm: Normal rate and regular rhythm.   Pulmonary:      Effort: Pulmonary effort is normal. No respiratory distress.      Breath sounds: Normal breath sounds.   Abdominal:      General: Abdomen is flat.      Palpations: Abdomen is soft.   Musculoskeletal:         General: Normal range of motion.      Comments: Known left sided residual deficit from prior CVA  Left groin access site  Heparin and TPA infusing catheters in place   Skin:     General: Skin is warm and dry.   Neurological:      General: No focal deficit present.      Mental Status: He is alert and oriented to person, place, and time. Mental status is at baseline.            Vents:       Lines/Drains/Airways       Peripheral Intravenous Line  Duration                  Peripheral IV - Single Lumen 12/22/23 0023 20 G Anterior;Right Upper Arm <1 day         Peripheral IV - Single Lumen 12/22/23 0534 20 G Left;Posterior Hand <1 day         Sheath 12/22/23 0900 Right anterior;proximal <1 day                     Significant Labs:    CBC/Anemia Profile:  Recent Labs   Lab 12/21/23  1019 12/22/23  0349   WBC 8.61 8.09   HGB 15.9 15.7   HCT 49.2 47.0    161   MCV 93 94   RDW 15.1* 15.5*        Chemistries:  Recent Labs   Lab 12/21/23  1019 12/22/23  0349    138   K 4.3 4.4    106   CO2 26 22*   BUN 20 19   CREATININE 0.72 0.9   CALCIUM 9.5 9.0   ALBUMIN 3.8 3.1*   PROT 7.1 6.6   BILITOT 0.8 0.5   ALKPHOS 80 91   ALT 36 29   AST 47 30   MG  --  1.8   PHOS  --  3.6           Significant Imaging: I have reviewed all pertinent imaging results/findings within the past 24 hours.

## 2023-12-22 NOTE — ED NOTES
Pt yelling from room. Upon entering, pt found to have pulled out second IV. Bleeding controlled. New PIV placed.

## 2023-12-23 ENCOUNTER — ANESTHESIA (OUTPATIENT)
Dept: SURGERY | Facility: HOSPITAL | Age: 63
DRG: 240 | End: 2023-12-23
Payer: MEDICARE

## 2023-12-23 LAB
ALBUMIN SERPL BCP-MCNC: 2.5 G/DL (ref 3.5–5.2)
ALBUMIN SERPL BCP-MCNC: 2.6 G/DL (ref 3.5–5.2)
ALP SERPL-CCNC: 70 U/L (ref 55–135)
ALP SERPL-CCNC: 72 U/L (ref 55–135)
ALT SERPL W/O P-5'-P-CCNC: 17 U/L (ref 10–44)
ALT SERPL W/O P-5'-P-CCNC: 19 U/L (ref 10–44)
ANION GAP SERPL CALC-SCNC: 8 MMOL/L (ref 8–16)
ANION GAP SERPL CALC-SCNC: 9 MMOL/L (ref 8–16)
APTT PPP: 32.8 SEC (ref 21–32)
APTT PPP: 38.4 SEC (ref 21–32)
APTT PPP: 44.5 SEC (ref 21–32)
AST SERPL-CCNC: 26 U/L (ref 10–40)
AST SERPL-CCNC: 27 U/L (ref 10–40)
BASOPHILS # BLD AUTO: 0.04 K/UL (ref 0–0.2)
BASOPHILS # BLD AUTO: 0.04 K/UL (ref 0–0.2)
BASOPHILS NFR BLD: 0.3 % (ref 0–1.9)
BASOPHILS NFR BLD: 0.4 % (ref 0–1.9)
BILIRUB SERPL-MCNC: 0.7 MG/DL (ref 0.1–1)
BILIRUB SERPL-MCNC: 0.9 MG/DL (ref 0.1–1)
BUN SERPL-MCNC: 16 MG/DL (ref 8–23)
BUN SERPL-MCNC: 16 MG/DL (ref 8–23)
CALCIUM SERPL-MCNC: 7.6 MG/DL (ref 8.7–10.5)
CALCIUM SERPL-MCNC: 7.8 MG/DL (ref 8.7–10.5)
CHLORIDE SERPL-SCNC: 109 MMOL/L (ref 95–110)
CHLORIDE SERPL-SCNC: 111 MMOL/L (ref 95–110)
CO2 SERPL-SCNC: 19 MMOL/L (ref 23–29)
CO2 SERPL-SCNC: 22 MMOL/L (ref 23–29)
CREAT SERPL-MCNC: 0.8 MG/DL (ref 0.5–1.4)
CREAT SERPL-MCNC: 0.9 MG/DL (ref 0.5–1.4)
DIFFERENTIAL METHOD BLD: ABNORMAL
DIFFERENTIAL METHOD BLD: ABNORMAL
EOSINOPHIL # BLD AUTO: 0 K/UL (ref 0–0.5)
EOSINOPHIL # BLD AUTO: 0.1 K/UL (ref 0–0.5)
EOSINOPHIL NFR BLD: 0.2 % (ref 0–8)
EOSINOPHIL NFR BLD: 1.1 % (ref 0–8)
ERYTHROCYTE [DISTWIDTH] IN BLOOD BY AUTOMATED COUNT: 15.4 % (ref 11.5–14.5)
ERYTHROCYTE [DISTWIDTH] IN BLOOD BY AUTOMATED COUNT: 15.6 % (ref 11.5–14.5)
EST. GFR  (NO RACE VARIABLE): >60 ML/MIN/1.73 M^2
EST. GFR  (NO RACE VARIABLE): >60 ML/MIN/1.73 M^2
FIBRINOGEN PPP-MCNC: 134 MG/DL (ref 182–400)
FIBRINOGEN PPP-MCNC: 175 MG/DL (ref 182–400)
GLUCOSE SERPL-MCNC: 107 MG/DL (ref 70–110)
GLUCOSE SERPL-MCNC: 121 MG/DL (ref 70–110)
HCT VFR BLD AUTO: 40.6 % (ref 40–54)
HCT VFR BLD AUTO: 43.5 % (ref 40–54)
HGB BLD-MCNC: 13.4 G/DL (ref 14–18)
HGB BLD-MCNC: 14.9 G/DL (ref 14–18)
IMM GRANULOCYTES # BLD AUTO: 0.03 K/UL (ref 0–0.04)
IMM GRANULOCYTES # BLD AUTO: 0.04 K/UL (ref 0–0.04)
IMM GRANULOCYTES NFR BLD AUTO: 0.3 % (ref 0–0.5)
IMM GRANULOCYTES NFR BLD AUTO: 0.3 % (ref 0–0.5)
LYMPHOCYTES # BLD AUTO: 0.5 K/UL (ref 1–4.8)
LYMPHOCYTES # BLD AUTO: 0.8 K/UL (ref 1–4.8)
LYMPHOCYTES NFR BLD: 4.1 % (ref 18–48)
LYMPHOCYTES NFR BLD: 8.2 % (ref 18–48)
MAGNESIUM SERPL-MCNC: 1.7 MG/DL (ref 1.6–2.6)
MCH RBC QN AUTO: 30.8 PG (ref 27–31)
MCH RBC QN AUTO: 30.9 PG (ref 27–31)
MCHC RBC AUTO-ENTMCNC: 33 G/DL (ref 32–36)
MCHC RBC AUTO-ENTMCNC: 34.3 G/DL (ref 32–36)
MCV RBC AUTO: 90 FL (ref 82–98)
MCV RBC AUTO: 94 FL (ref 82–98)
MONOCYTES # BLD AUTO: 0.6 K/UL (ref 0.3–1)
MONOCYTES # BLD AUTO: 0.6 K/UL (ref 0.3–1)
MONOCYTES NFR BLD: 4.9 % (ref 4–15)
MONOCYTES NFR BLD: 5.9 % (ref 4–15)
NEUTROPHILS # BLD AUTO: 11.5 K/UL (ref 1.8–7.7)
NEUTROPHILS # BLD AUTO: 7.9 K/UL (ref 1.8–7.7)
NEUTROPHILS NFR BLD: 84.1 % (ref 38–73)
NEUTROPHILS NFR BLD: 90.2 % (ref 38–73)
NRBC BLD-RTO: 0 /100 WBC
NRBC BLD-RTO: 0 /100 WBC
PHOSPHATE SERPL-MCNC: 3.9 MG/DL (ref 2.7–4.5)
PLATELET # BLD AUTO: 100 K/UL (ref 150–450)
PLATELET # BLD AUTO: 108 K/UL (ref 150–450)
PMV BLD AUTO: 10.6 FL (ref 9.2–12.9)
PMV BLD AUTO: 9.2 FL (ref 9.2–12.9)
POTASSIUM SERPL-SCNC: 4 MMOL/L (ref 3.5–5.1)
POTASSIUM SERPL-SCNC: 4.5 MMOL/L (ref 3.5–5.1)
PROT SERPL-MCNC: 5.3 G/DL (ref 6–8.4)
PROT SERPL-MCNC: 5.3 G/DL (ref 6–8.4)
RBC # BLD AUTO: 4.34 M/UL (ref 4.6–6.2)
RBC # BLD AUTO: 4.83 M/UL (ref 4.6–6.2)
SODIUM SERPL-SCNC: 139 MMOL/L (ref 136–145)
SODIUM SERPL-SCNC: 139 MMOL/L (ref 136–145)
WBC # BLD AUTO: 12.78 K/UL (ref 3.9–12.7)
WBC # BLD AUTO: 9.35 K/UL (ref 3.9–12.7)

## 2023-12-23 PROCEDURE — 25000003 PHARM REV CODE 250: Performed by: STUDENT IN AN ORGANIZED HEALTH CARE EDUCATION/TRAINING PROGRAM

## 2023-12-23 PROCEDURE — 37000008 HC ANESTHESIA 1ST 15 MINUTES: Performed by: SURGERY

## 2023-12-23 PROCEDURE — 84100 ASSAY OF PHOSPHORUS: CPT

## 2023-12-23 PROCEDURE — 63600175 PHARM REV CODE 636 W HCPCS: Performed by: SURGERY

## 2023-12-23 PROCEDURE — 36000706: Performed by: SURGERY

## 2023-12-23 PROCEDURE — 04CS0ZZ EXTIRPATION OF MATTER FROM LEFT POSTERIOR TIBIAL ARTERY, OPEN APPROACH: ICD-10-PCS | Performed by: SURGERY

## 2023-12-23 PROCEDURE — 36000707: Performed by: SURGERY

## 2023-12-23 PROCEDURE — 63600175 PHARM REV CODE 636 W HCPCS: Performed by: STUDENT IN AN ORGANIZED HEALTH CARE EDUCATION/TRAINING PROGRAM

## 2023-12-23 PROCEDURE — 80053 COMPREHEN METABOLIC PANEL: CPT | Performed by: STUDENT IN AN ORGANIZED HEALTH CARE EDUCATION/TRAINING PROGRAM

## 2023-12-23 PROCEDURE — D9220A PRA ANESTHESIA: ICD-10-PCS | Mod: ANES,,, | Performed by: ANESTHESIOLOGY

## 2023-12-23 PROCEDURE — 35703 EXPL N/FLWD SURG LXTR ART: CPT | Mod: LT,,, | Performed by: SURGERY

## 2023-12-23 PROCEDURE — 25500020 PHARM REV CODE 255: Performed by: SURGERY

## 2023-12-23 PROCEDURE — 80053 COMPREHEN METABOLIC PANEL: CPT | Mod: 91

## 2023-12-23 PROCEDURE — D9220A PRA ANESTHESIA: Mod: ANES,,, | Performed by: ANESTHESIOLOGY

## 2023-12-23 PROCEDURE — D9220A PRA ANESTHESIA: Mod: CRNA,,, | Performed by: NURSE ANESTHETIST, CERTIFIED REGISTERED

## 2023-12-23 PROCEDURE — D9220A PRA ANESTHESIA: ICD-10-PCS | Mod: CRNA,,, | Performed by: NURSE ANESTHETIST, CERTIFIED REGISTERED

## 2023-12-23 PROCEDURE — 83735 ASSAY OF MAGNESIUM: CPT

## 2023-12-23 PROCEDURE — 85384 FIBRINOGEN ACTIVITY: CPT | Mod: 91 | Performed by: STUDENT IN AN ORGANIZED HEALTH CARE EDUCATION/TRAINING PROGRAM

## 2023-12-23 PROCEDURE — 25000003 PHARM REV CODE 250

## 2023-12-23 PROCEDURE — 36415 COLL VENOUS BLD VENIPUNCTURE: CPT

## 2023-12-23 PROCEDURE — 37000009 HC ANESTHESIA EA ADD 15 MINS: Performed by: SURGERY

## 2023-12-23 PROCEDURE — 63600175 PHARM REV CODE 636 W HCPCS

## 2023-12-23 PROCEDURE — 85730 THROMBOPLASTIN TIME PARTIAL: CPT | Mod: 91

## 2023-12-23 PROCEDURE — 85730 THROMBOPLASTIN TIME PARTIAL: CPT | Performed by: STUDENT IN AN ORGANIZED HEALTH CARE EDUCATION/TRAINING PROGRAM

## 2023-12-23 PROCEDURE — 11000001 HC ACUTE MED/SURG PRIVATE ROOM

## 2023-12-23 PROCEDURE — 25000003 PHARM REV CODE 250: Performed by: NURSE ANESTHETIST, CERTIFIED REGISTERED

## 2023-12-23 PROCEDURE — 25000003 PHARM REV CODE 250: Performed by: SURGERY

## 2023-12-23 PROCEDURE — 85730 THROMBOPLASTIN TIME PARTIAL: CPT | Mod: 91 | Performed by: STUDENT IN AN ORGANIZED HEALTH CARE EDUCATION/TRAINING PROGRAM

## 2023-12-23 PROCEDURE — 63600175 PHARM REV CODE 636 W HCPCS: Performed by: NURSE ANESTHETIST, CERTIFIED REGISTERED

## 2023-12-23 PROCEDURE — 85025 COMPLETE CBC W/AUTO DIFF WBC: CPT | Performed by: STUDENT IN AN ORGANIZED HEALTH CARE EDUCATION/TRAINING PROGRAM

## 2023-12-23 PROCEDURE — 37214 CESSJ THERAPY CATH REMOVAL: CPT | Mod: 51,LT,, | Performed by: SURGERY

## 2023-12-23 PROCEDURE — C1769 GUIDE WIRE: HCPCS | Performed by: SURGERY

## 2023-12-23 PROCEDURE — C1760 CLOSURE DEV, VASC: HCPCS | Performed by: SURGERY

## 2023-12-23 PROCEDURE — C1894 INTRO/SHEATH, NON-LASER: HCPCS | Performed by: SURGERY

## 2023-12-23 RX ORDER — ONDANSETRON 2 MG/ML
INJECTION INTRAMUSCULAR; INTRAVENOUS
Status: DISCONTINUED | OUTPATIENT
Start: 2023-12-23 | End: 2023-12-23

## 2023-12-23 RX ORDER — HALOPERIDOL 5 MG/ML
0.5 INJECTION INTRAMUSCULAR EVERY 10 MIN PRN
Status: CANCELLED | OUTPATIENT
Start: 2023-12-23

## 2023-12-23 RX ORDER — FENTANYL CITRATE 50 UG/ML
25 INJECTION, SOLUTION INTRAMUSCULAR; INTRAVENOUS EVERY 5 MIN PRN
Status: CANCELLED | OUTPATIENT
Start: 2023-12-23

## 2023-12-23 RX ORDER — HYDROMORPHONE HYDROCHLORIDE 1 MG/ML
0.5 INJECTION, SOLUTION INTRAMUSCULAR; INTRAVENOUS; SUBCUTANEOUS ONCE
Status: COMPLETED | OUTPATIENT
Start: 2023-12-23 | End: 2023-12-23

## 2023-12-23 RX ORDER — PROPOFOL 10 MG/ML
INJECTION, EMULSION INTRAVENOUS
Status: DISCONTINUED | OUTPATIENT
Start: 2023-12-23 | End: 2023-12-23

## 2023-12-23 RX ORDER — HEPARIN SODIUM 1000 [USP'U]/ML
INJECTION, SOLUTION INTRAVENOUS; SUBCUTANEOUS
Status: DISCONTINUED | OUTPATIENT
Start: 2023-12-23 | End: 2023-12-23 | Stop reason: HOSPADM

## 2023-12-23 RX ORDER — MIDAZOLAM HYDROCHLORIDE 1 MG/ML
INJECTION INTRAMUSCULAR; INTRAVENOUS
Status: DISCONTINUED | OUTPATIENT
Start: 2023-12-23 | End: 2023-12-23

## 2023-12-23 RX ORDER — ROCURONIUM BROMIDE 10 MG/ML
INJECTION, SOLUTION INTRAVENOUS
Status: DISCONTINUED | OUTPATIENT
Start: 2023-12-23 | End: 2023-12-23

## 2023-12-23 RX ORDER — LIDOCAINE HYDROCHLORIDE 10 MG/ML
INJECTION, SOLUTION EPIDURAL; INFILTRATION; INTRACAUDAL; PERINEURAL
Status: DISCONTINUED | OUTPATIENT
Start: 2023-12-23 | End: 2023-12-23 | Stop reason: HOSPADM

## 2023-12-23 RX ORDER — HYDROMORPHONE HCL IN 0.9% NACL 6 MG/30 ML
PATIENT CONTROLLED ANALGESIA SYRINGE INTRAVENOUS CONTINUOUS
Status: DISCONTINUED | OUTPATIENT
Start: 2023-12-23 | End: 2023-12-28

## 2023-12-23 RX ORDER — IODIXANOL 320 MG/ML
INJECTION, SOLUTION INTRAVASCULAR
Status: DISCONTINUED | OUTPATIENT
Start: 2023-12-23 | End: 2023-12-23 | Stop reason: HOSPADM

## 2023-12-23 RX ORDER — HEPARIN SODIUM,PORCINE/D5W 25000/250
0-40 INTRAVENOUS SOLUTION INTRAVENOUS CONTINUOUS
Status: DISCONTINUED | OUTPATIENT
Start: 2023-12-23 | End: 2023-12-26

## 2023-12-23 RX ORDER — DEXMEDETOMIDINE HYDROCHLORIDE 100 UG/ML
INJECTION, SOLUTION INTRAVENOUS
Status: DISCONTINUED | OUTPATIENT
Start: 2023-12-23 | End: 2023-12-23

## 2023-12-23 RX ORDER — PROPOFOL 10 MG/ML
VIAL (ML) INTRAVENOUS CONTINUOUS PRN
Status: DISCONTINUED | OUTPATIENT
Start: 2023-12-23 | End: 2023-12-23

## 2023-12-23 RX ORDER — NALOXONE HCL 0.4 MG/ML
0.02 VIAL (ML) INJECTION
Status: DISCONTINUED | OUTPATIENT
Start: 2023-12-23 | End: 2023-12-28

## 2023-12-23 RX ORDER — DEXAMETHASONE SODIUM PHOSPHATE 4 MG/ML
INJECTION, SOLUTION INTRA-ARTICULAR; INTRALESIONAL; INTRAMUSCULAR; INTRAVENOUS; SOFT TISSUE
Status: DISCONTINUED | OUTPATIENT
Start: 2023-12-23 | End: 2023-12-23

## 2023-12-23 RX ADMIN — DIVALPROEX SODIUM 125 MG: 125 TABLET, DELAYED RELEASE ORAL at 11:12

## 2023-12-23 RX ADMIN — DEXAMETHASONE SODIUM PHOSPHATE 4 MG: 4 INJECTION, SOLUTION INTRAMUSCULAR; INTRAVENOUS at 10:12

## 2023-12-23 RX ADMIN — SUGAMMADEX 400 MG: 100 INJECTION, SOLUTION INTRAVENOUS at 10:12

## 2023-12-23 RX ADMIN — HYDROMORPHONE HYDROCHLORIDE 0.5 MG: 1 INJECTION, SOLUTION INTRAMUSCULAR; INTRAVENOUS; SUBCUTANEOUS at 07:12

## 2023-12-23 RX ADMIN — DEXMEDETOMIDINE 4 MCG: 100 INJECTION, SOLUTION, CONCENTRATE INTRAVENOUS at 09:12

## 2023-12-23 RX ADMIN — NICARDIPINE HYDROCHLORIDE 12.5 MG/HR: 0.2 INJECTION, SOLUTION INTRAVENOUS at 12:12

## 2023-12-23 RX ADMIN — DEXMEDETOMIDINE 4 MCG: 100 INJECTION, SOLUTION, CONCENTRATE INTRAVENOUS at 08:12

## 2023-12-23 RX ADMIN — SODIUM CHLORIDE, SODIUM ACETATE ANHYDROUS, SODIUM GLUCONATE, POTASSIUM CHLORIDE, AND MAGNESIUM CHLORIDE: 526; 222; 502; 37; 30 INJECTION, SOLUTION INTRAVENOUS at 08:12

## 2023-12-23 RX ADMIN — CARVEDILOL 6.25 MG: 6.25 TABLET, FILM COATED ORAL at 05:12

## 2023-12-23 RX ADMIN — ASPIRIN 81 MG CHEWABLE TABLET 81 MG: 81 TABLET CHEWABLE at 01:12

## 2023-12-23 RX ADMIN — NICARDIPINE HYDROCHLORIDE 5 MG/HR: 0.2 INJECTION, SOLUTION INTRAVENOUS at 05:12

## 2023-12-23 RX ADMIN — HEPARIN SODIUM 18 UNITS/KG/HR: 10000 INJECTION, SOLUTION INTRAVENOUS at 11:12

## 2023-12-23 RX ADMIN — PANTOPRAZOLE SODIUM 40 MG: 40 TABLET, DELAYED RELEASE ORAL at 05:12

## 2023-12-23 RX ADMIN — DIVALPROEX SODIUM 125 MG: 125 TABLET, DELAYED RELEASE ORAL at 03:12

## 2023-12-23 RX ADMIN — QUETIAPINE FUMARATE 25 MG: 25 TABLET ORAL at 09:12

## 2023-12-23 RX ADMIN — Medication: at 11:12

## 2023-12-23 RX ADMIN — HYDROMORPHONE HYDROCHLORIDE 0.5 MG: 0.5 INJECTION, SOLUTION INTRAMUSCULAR; INTRAVENOUS; SUBCUTANEOUS at 04:12

## 2023-12-23 RX ADMIN — ATORVASTATIN CALCIUM 80 MG: 40 TABLET, FILM COATED ORAL at 01:12

## 2023-12-23 RX ADMIN — HYDROMORPHONE HYDROCHLORIDE 0.5 MG: 1 INJECTION, SOLUTION INTRAMUSCULAR; INTRAVENOUS; SUBCUTANEOUS at 03:12

## 2023-12-23 RX ADMIN — SODIUM CHLORIDE: 9 INJECTION, SOLUTION INTRAVENOUS at 08:12

## 2023-12-23 RX ADMIN — HEPARIN SODIUM 18 UNITS/KG/HR: 10000 INJECTION, SOLUTION INTRAVENOUS at 06:12

## 2023-12-23 RX ADMIN — CEFAZOLIN 2 G: 2 INJECTION, POWDER, FOR SOLUTION INTRAMUSCULAR; INTRAVENOUS at 11:12

## 2023-12-23 RX ADMIN — PROPOFOL 20 MG: 10 INJECTION, EMULSION INTRAVENOUS at 09:12

## 2023-12-23 RX ADMIN — ROCURONIUM BROMIDE 50 MG: 10 INJECTION, SOLUTION INTRAVENOUS at 09:12

## 2023-12-23 RX ADMIN — MIDAZOLAM HYDROCHLORIDE 0.5 MG: 1 INJECTION INTRAMUSCULAR; INTRAVENOUS at 08:12

## 2023-12-23 RX ADMIN — CEFAZOLIN 2 G: 2 INJECTION, POWDER, FOR SOLUTION INTRAMUSCULAR; INTRAVENOUS at 03:12

## 2023-12-23 RX ADMIN — PROPOFOL 50 MG: 10 INJECTION, EMULSION INTRAVENOUS at 09:12

## 2023-12-23 RX ADMIN — CARVEDILOL 6.25 MG: 6.25 TABLET, FILM COATED ORAL at 01:12

## 2023-12-23 RX ADMIN — DEXMEDETOMIDINE 84 MCG: 100 INJECTION, SOLUTION, CONCENTRATE INTRAVENOUS at 08:12

## 2023-12-23 RX ADMIN — MIDAZOLAM HYDROCHLORIDE 1 MG: 1 INJECTION INTRAMUSCULAR; INTRAVENOUS at 08:12

## 2023-12-23 RX ADMIN — PROPOFOL 15 MCG/KG/MIN: 10 INJECTION, EMULSION INTRAVENOUS at 08:12

## 2023-12-23 RX ADMIN — CEFAZOLIN 2 G: 2 INJECTION, POWDER, FOR SOLUTION INTRAMUSCULAR; INTRAVENOUS at 07:12

## 2023-12-23 RX ADMIN — SODIUM CHLORIDE, POTASSIUM CHLORIDE, SODIUM LACTATE AND CALCIUM CHLORIDE: 600; 310; 30; 20 INJECTION, SOLUTION INTRAVENOUS at 11:12

## 2023-12-23 RX ADMIN — HYDROCODONE BITARTRATE AND ACETAMINOPHEN 1 TABLET: 5; 325 TABLET ORAL at 04:12

## 2023-12-23 RX ADMIN — PROPOFOL 20 MG: 10 INJECTION, EMULSION INTRAVENOUS at 08:12

## 2023-12-23 RX ADMIN — ONDANSETRON 4 MG: 2 INJECTION INTRAMUSCULAR; INTRAVENOUS at 10:12

## 2023-12-23 NOTE — PROGRESS NOTES
[facility]  Vascular Surgery  Progress Note    Patient Name: Jamari Huerta III  MRN: [id number]  Admission Date: 2023  Primary Care Provider: Nataliya Anderson MD    Subjective:     Interval History: NAEON. Severe pain in the left leg. Understands risks, benefits of angiogram and plan for thrombolysis/stenting. Knows he may need amputation in the future; does not want amputation at this time. Spoke with his sister Carolyn who thinks he should be DNR and says that their mother  of similar etiology     Post-Op Info:  Procedure(s) (LRB):  Angiogram Extremity Unilateral (Left)   Day of Surgery     Medications Prior to Admission   Medication Sig Dispense Refill Last Dose    aspirin 81 MG Chew Take 81 mg by mouth once daily.       atorvastatin (LIPITOR) 80 MG tablet Take 1 tablet (80 mg total) by mouth once daily. 90 tablet 3     carvediloL (COREG) 6.25 MG tablet Take 6.25 mg by mouth 2 (two) times daily with meals.       divalproex (DEPAKOTE) 125 MG EC tablet Take 125 mg by mouth 3 (three) times daily.       pantoprazole (PROTONIX) 40 MG tablet Take 40 mg by mouth Daily. Indications: gastroesophageal reflux disease       QUEtiapine (SEROQUEL) 25 MG Tab 1 tab at bedtime 90 tablet 3     sacubitriL-valsartan (ENTRESTO) 24-26 mg per tablet Take 1 tablet by mouth once daily. 90 tablet 3          Review of patient's allergies indicates:   Allergen Reactions    Bupropion Swelling    Zyban [bupropion hcl (smoking deter)] Swelling    Morphine Itching and Rash       Past Medical History:   Diagnosis Date    Abnormal PFT     Cardiac murmur     Cardiomegaly     CKD (chronic kidney disease)     Coronary artery disease     Depression     DVT (deep venous thrombosis)     left lower leg    Dyspnea     Edema     Episode of transient neurologic symptoms 2019    Essential hypertension 04/15/2014    GI bleed     Heart attack 2023    approxiamate date    High cholesterol     Hypertension     Mixed  hyperlipidemia 04/15/2014    PVD (peripheral vascular disease)     Stroke 06/01/2023    aprroximate date    Thrombotic stroke involving right middle cerebral artery 05/14/2019     Past Surgical History:   Procedure Laterality Date    AORTIC VALVE SURGERY      1982 -    VASCULAR SURGERY      left lower leg     Family History       Problem Relation (Age of Onset)    Diabetes Mother          Tobacco Use    Smoking status: Every Day     Current packs/day: 1.00     Average packs/day: 1 pack/day for 55.0 years (55.0 ttl pk-yrs)     Types: Cigarettes     Start date: 1/1/1969    Smokeless tobacco: Never   Substance and Sexual Activity    Alcohol use: No     Comment: Hx of drinking alot when younger    Drug use: No    Sexual activity: Not on file     Review of Systems   Constitutional:  Negative for activity change, appetite change and chills.   HENT:  Negative for congestion, ear pain and hearing loss.    Eyes:  Negative for pain, discharge and itching.   Respiratory:  Negative for choking, chest tightness and shortness of breath.    Cardiovascular:  Negative for chest pain and palpitations.   Gastrointestinal:  Negative for abdominal distention, abdominal pain and anal bleeding.   Genitourinary:  Negative for difficulty urinating and dysuria.   Musculoskeletal:  Positive for myalgias (calf, shin on walking not at rest).   Neurological:  Positive for weakness (mild L>R).     Objective:     Vital Signs (Most Recent):  Temp: 98.5 °F (36.9 °C) (12/23/23 0700)  Pulse: 84 (12/23/23 0745)  Resp: (!) 32 (12/23/23 0745)  BP: (!) 144/64 (12/23/23 0700)  SpO2: (!) 93 % (12/23/23 0745) Vital Signs (24h Range):  Temp:  [98.2 °F (36.8 °C)-99.1 °F (37.3 °C)] 98.5 °F (36.9 °C)  Pulse:  [75-94] 84  Resp:  [12-38] 32  SpO2:  [88 %-98 %] 93 %  BP: (137-209)/(63-91) 144/64  Arterial Line BP: (110-192)/(52-67) 116/55     Weight: 84 kg (185 lb 3 oz)  Body mass index is 26.57 kg/m².      Physical Exam  Constitutional:       Appearance: Normal  appearance.   HENT:      Head: Normocephalic and atraumatic.      Nose: Nose normal.      Mouth/Throat:      Mouth: Mucous membranes are moist.      Pharynx: Oropharynx is clear.   Eyes:      Extraocular Movements: Extraocular movements intact.      Conjunctiva/sclera: Conjunctivae normal.   Cardiovascular:      Rate and Rhythm: Normal rate and regular rhythm.      Comments: L Femoral pulse not palpable, L PT monophasic extremely diminished, DP not heard on doppler  R femoral pulse +1, popliteal not palpable  Pulmonary:      Effort: Pulmonary effort is normal.      Breath sounds: Normal breath sounds.   Abdominal:      General: Abdomen is flat.      Palpations: Abdomen is soft.   Musculoskeletal:      Comments: Tender to palpation left foot and calf  Strength diminished in left knee flexion, extension and ankle flexion and extension, chronic  Sensation dimninished in left compared to right in left lower extremity (chronic)   Skin:     General: Skin is warm and dry.      Capillary Refill: Capillary refill takes less than 2 seconds.      Comments: Large well healed scar from prior medial left leg fasciotomy    Neurological:      General: No focal deficit present.      Mental Status: He is alert.          Significant Labs:  All pertinent labs from the last 24 hours have been reviewed.    Significant Diagnostics:  I have reviewed all pertinent imaging results/findings within the past 24 hours.  Assessment/Plan:     * Thrombosis of femoro-popliteal bypass graft  63 M with long history of PAD, s/p SFA to above the knee popliteal artery bypass, current smoker, presents as a transfer due to concern for decreased sensation with concern for acute limb ischemia. Sensation decrease and motor weakness have been going on for months per the patient which is not consistent with acute limb ischemia and more consistent with worsening PAD. US does show that his bypass is occluded and may be causing the discomfort, edema he is  experiencing currently for which he will need a thrombolysis catheter placement to declot. His pain has improved which could be secondary to starting anticoagulation. His distal left toe has a wound that has been healing which is a good sign of good perfusion.    - NPO  - Angiogram with possible thrombolysis catheter placement, possible intervention with stents or PTA this morning   - Discussed possibility that this doesn't work in restoring his LLE perfusion and he may require amputation in the future           JAKE Mcdonnell MD  Vascular Surgery  [facility]

## 2023-12-23 NOTE — ASSESSMENT & PLAN NOTE
Jamari Huerta III is a 63 y.o. yo male who is s/p LLE thrombolysis catheter on with Dr. Mitchell      Neuro/Psych:   -- Sedation: none  -- Pain: tylenol, dilaudid PRN             Cards:   -- HDS  -- -160  -- on cardene gtt for SBP <160      Pulm:   -- Goal O2 sat > 90%  -- Wean as able      Renal:  -- Keep guido for strict I/O   Recent Labs   Lab 12/22/23  0349 12/22/23  1023 12/23/23  0456   BUN 19 17 16   CREATININE 0.9 0.8 0.9           FEN / GI:   -- Replace lytes as needed  -- Nutrition: NPO      ID:   -- Tm: afebrile  -- Abx: complete perioperative cefazolin 2g Q8H x 5 doses  Recent Labs   Lab 12/22/23  1721 12/22/23  2206 12/23/23  0144   WBC 9.36 11.51 12.78*          Heme/Onc:  -- H/H pre op 15.9  -- Daily CBC  -- Q4 CBC and fibrinogen  --- should fibrinogen <200 or decrease by half, inform vascular surgery fellow  Recent Labs   Lab 12/21/23  1048 12/21/23  1406 12/21/23  2115 12/22/23  1023 12/22/23  1357 12/22/23  1721 12/22/23  2206 12/23/23  0144   HGB  --   --    < > 15.5  15.5  --  15.2 15.4 14.9   PLT  --   --    < > 133*  133*  --  125* 117* 100*   APTT 28.2 99.7*   < > 86.5*   < > 32.1* 37.7* 38.4*   INR 1.1 1.1  --  1.1  --   --   --   --     < > = values in this interval not displayed.           Endo:   -- Gluc goal 140-180      PPx:   Feeding: NPO  Analgesia/Sedation: tylenol, dilaudid PRN  Thromboembolic prevention: heparin via thrombolysis catheter  HOB >30: flat  Stress Ulcer ppx: not indicated  Glucose control: Critical care goal 140-180 g/dl, SSI  Bowel reg: n/a  Invasive Lines/Drains/Airway: Guido, R rad art line, PIV  Deescalation: will reevaluate        Dispo/Code Status/Palliative:   -- SICU / Full Code

## 2023-12-23 NOTE — PLAN OF CARE
SICU PLAN OF CARE NOTE    Dx: Thrombosis of femoro-popliteal bypass graft    Goals of Care: SBP<160     Vital Signs (last 12 hours):   Temp:  [98 °F (36.7 °C)-98.5 °F (36.9 °C)]   Pulse:  [66-85]   Resp:  [16-32]   BP: (135-150)/(64-68)   SpO2:  [91 %-99 %]   Arterial Line BP: (110-165)/(50-65)      Neuro: AAO x4 and Follows Commands    Cardiac: NSR    Respiratory: Nasal Cannula 2L     Gtts: PCA  and Heparin    Urine Output: Urinary Catheter 875 cc/shift    Diet: Regular Diet     Labs/Accuchecks: Daily labs    Skin:  All skin remains free from injury.  Patient turned Q2, Michelle mattress inflated, and bed working correctly. Heel and sacral foams in place.     Shift Events:  Pt to OR for angiogram. See flowsheet for further assessment/details.  Family updated on current condition/plan of care, questions answered, and emotional support provided.

## 2023-12-23 NOTE — BRIEF OP NOTE
[facility]  Brief Operative Note    SUMMARY     Surgery Date: 12/23/2023     Surgeon(s) and Role:     * Wes Cobian MD - Primary     * Denny Sow MD - Fellow    Assisting Surgeon: None    Pre-op Diagnosis:  Critical lower limb ischemia [I70.229]    Post-op Diagnosis:  Post-Op Diagnosis Codes:     * Critical lower limb ischemia [I70.229]    Procedure(s) (LRB):  Angiogram Extremity Unilateral (Left)  6/7 [place] Mynx closure  Left posterior tibial artery exploration    Anesthesia: Local MAC    Implants:  * No implants in log *    Operative Findings:  Large thrombus burden remaining in fem pop bypass despite thrombolysis catheter.  Anterior tibial artery fills via collaterals, severely diminutive at the level of the ankle.  Open exploration of posterior tibial artery at the ankle demonstrated 1-2 mm vessel, not adequate for bypass.    Estimated Blood Loss: 40 mL           Specimens:   Specimen (24h ago, onward)      None            [id number]

## 2023-12-23 NOTE — NURSING
Called Dr. Sow to notify of new lab results, fibrinogen of 134.  Order received to stop TPA infusion and get in touch with anesthesia resident for new Heparin order.

## 2023-12-23 NOTE — OP NOTE
[facility]  Operative Note     SUMMARY      Surgery Date: 12/23/2023      Surgeon(s) and Role:     * Wes Cobian MD - Primary     * Denny Sow MD - Fellow     Assisting Surgeon: None     Pre-op Diagnosis:  Critical lower limb ischemia [I70.229]     Post-op Diagnosis:  Post-Op Diagnosis Codes:     * Critical lower limb ischemia [I70.229]     Procedure(s) (LRB):  Angiogram Extremity Unilateral (Left)  6/7 Malay Mynx closure  Left posterior tibial artery exploration     Anesthesia: Local MAC     Implants:  * No implants in log *     Operative Findings:  Large thrombus burden remaining in fem pop bypass despite thrombolysis catheter.  Posterior tibial artery fills via collaterals, severely diminutive at the level of the ankle.  Open exploration of posterior tibial artery at the ankle demonstrated 1-2 mm vessel, not adequate for bypass.     Estimated Blood Loss: 40 mL            Specimens:   Specimen (24h ago, onward)        None                Indications:  63-year-old man with history of femoral popliteal artery bypass who presented with left leg pain and imaging consistent with occluded bypass graft.  He was taken for an angiogram with thrombolysis catheter placement yesterday and was consented for return to the operating room for evaluation of reperfusion and all indicated procedures.      Procedure details:   After obtaining signed consent for the above procedure, the patient was brought to the operating theater and placed in supine position.  Sedation was provided by anesthesia.  Preoperative antibiotics were administered.  The bilateral groins and existing sheath were prepped and draped in the usual sterile fashion.  A surgical time-out was performed confirming patient identity, laterality, and procedure.  We started by performing angiogram via the existing thrombolysis catheter as well as the sheath were that was in place.  This identified large burden of thrombus within  the graft relatively unchanged from previous angiogram, likely chronic in nature.  His posterior tibial artery appeared to fill more proximal via collaterals and there did appear to be flow down to the level of the foot however, this appeared small in nature.  Given the fact that the patient's fibrinogen had dropped to less than 130 this morning he was not going to tolerate any further thrombolysis, no open bypass to the posterior tibial artery was our best option to reinstate perfusion to his foot.  Given this, we elected to abort our endovascular approach and cut down on the posterior tibial artery at the level of the ankle to evaluate its size and level of calcium.  The sheath was exchanged over wire for a standard length 6 Setswana sheath in the arteriotomy in the right common femoral artery was closed with a 6/7 Setswana Mynx closure device.  We prepped and draped the left leg circumferentially in the usual sterile fashion.  A linear incision was created just posterior to the medial malleolus of the ankle and electrocautery used to deepen this down to the neurovascular bundle.  To posterior tibial veins were identified and the artery traveling with the was isolated.  This appeared to be 1-2 mm in diameter and did not appear to be an adequate distal bypass target.  At this time, the procedure was aborted.  The incision was closed with combination of nylon sutures and skin staples.  Sterile dressings were applied.  Instrument and sponge count were correct x2.  Patient tolerated the procedure and was transported to the ICU in good condition.    Dr. Cobian was present for the entire procedure     Denny Sow  Vascular Surgery Fellow, PGY-7  [phone]

## 2023-12-23 NOTE — SUBJECTIVE & OBJECTIVE
Medications Prior to Admission   Medication Sig Dispense Refill Last Dose    aspirin 81 MG Chew Take 81 mg by mouth once daily.       atorvastatin (LIPITOR) 80 MG tablet Take 1 tablet (80 mg total) by mouth once daily. 90 tablet 3     carvediloL (COREG) 6.25 MG tablet Take 6.25 mg by mouth 2 (two) times daily with meals.       divalproex (DEPAKOTE) 125 MG EC tablet Take 125 mg by mouth 3 (three) times daily.       pantoprazole (PROTONIX) 40 MG tablet Take 40 mg by mouth Daily. Indications: gastroesophageal reflux disease       QUEtiapine (SEROQUEL) 25 MG Tab 1 tab at bedtime 90 tablet 3     sacubitriL-valsartan (ENTRESTO) 24-26 mg per tablet Take 1 tablet by mouth once daily. 90 tablet 3          Review of patient's allergies indicates:   Allergen Reactions    Bupropion Swelling    Zyban [bupropion hcl (smoking deter)] Swelling    Morphine Itching and Rash       Past Medical History:   Diagnosis Date    Abnormal PFT     Cardiac murmur     Cardiomegaly     CKD (chronic kidney disease)     Coronary artery disease     Depression     DVT (deep venous thrombosis)     left lower leg    Dyspnea     Edema     Episode of transient neurologic symptoms 05/14/2019    Essential hypertension 04/15/2014    GI bleed     Heart attack 06/01/2023    approxiamate date    High cholesterol     Hypertension     Mixed hyperlipidemia 04/15/2014    PVD (peripheral vascular disease)     Stroke 06/01/2023    aprroximate date    Thrombotic stroke involving right middle cerebral artery 05/14/2019     Past Surgical History:   Procedure Laterality Date    AORTIC VALVE SURGERY      1982 -    VASCULAR SURGERY      left lower leg     Family History       Problem Relation (Age of Onset)    Diabetes Mother          Tobacco Use    Smoking status: Every Day     Current packs/day: 1.00     Average packs/day: 1 pack/day for 55.0 years (55.0 ttl pk-yrs)     Types: Cigarettes     Start date: 1/1/1969    Smokeless tobacco: Never   Substance and Sexual  Activity    Alcohol use: No     Comment: Hx of drinking alot when younger    Drug use: No    Sexual activity: Not on file     Review of Systems   Constitutional:  Negative for activity change, appetite change and chills.   HENT:  Negative for congestion, ear pain and hearing loss.    Eyes:  Negative for pain, discharge and itching.   Respiratory:  Negative for choking, chest tightness and shortness of breath.    Cardiovascular:  Negative for chest pain and palpitations.   Gastrointestinal:  Negative for abdominal distention, abdominal pain and anal bleeding.   Genitourinary:  Negative for difficulty urinating and dysuria.   Musculoskeletal:  Positive for myalgias (calf, shin on walking not at rest).   Neurological:  Positive for weakness (mild L>R).     Objective:     Vital Signs (Most Recent):  Temp: 98.5 °F (36.9 °C) (12/23/23 0700)  Pulse: 84 (12/23/23 0745)  Resp: (!) 32 (12/23/23 0745)  BP: (!) 144/64 (12/23/23 0700)  SpO2: (!) 93 % (12/23/23 0745) Vital Signs (24h Range):  Temp:  [98.2 °F (36.8 °C)-99.1 °F (37.3 °C)] 98.5 °F (36.9 °C)  Pulse:  [75-94] 84  Resp:  [12-38] 32  SpO2:  [88 %-98 %] 93 %  BP: (137-209)/(63-91) 144/64  Arterial Line BP: (110-192)/(52-67) 116/55     Weight: 84 kg (185 lb 3 oz)  Body mass index is 26.57 kg/m².      Physical Exam  Constitutional:       Appearance: Normal appearance.   HENT:      Head: Normocephalic and atraumatic.      Nose: Nose normal.      Mouth/Throat:      Mouth: Mucous membranes are moist.      Pharynx: Oropharynx is clear.   Eyes:      Extraocular Movements: Extraocular movements intact.      Conjunctiva/sclera: Conjunctivae normal.   Cardiovascular:      Rate and Rhythm: Normal rate and regular rhythm.      Comments: L Femoral pulse not palpable, L PT monophasic extremely diminished, DP not heard on doppler  R femoral pulse +1, popliteal not palpable  Pulmonary:      Effort: Pulmonary effort is normal.      Breath sounds: Normal breath sounds.   Abdominal:       General: Abdomen is flat.      Palpations: Abdomen is soft.   Musculoskeletal:      Comments: Tender to palpation left foot and calf  Strength diminished in left knee flexion, extension and ankle flexion and extension, chronic  Sensation dimninished in left compared to right in left lower extremity (chronic)   Skin:     General: Skin is warm and dry.      Capillary Refill: Capillary refill takes less than 2 seconds.      Comments: Large well healed scar from prior medial left leg fasciotomy    Neurological:      General: No focal deficit present.      Mental Status: He is alert.          Significant Labs:  All pertinent labs from the last 24 hours have been reviewed.    Significant Diagnostics:  I have reviewed all pertinent imaging results/findings within the past 24 hours.

## 2023-12-23 NOTE — PROGRESS NOTES
Pt returned from OR. Charge RN, St. Curtis MD, and MD Tereso at bedside. No doppler signals in L PT or DP. No neuro checks per St. Curtis MD. Will likely need amputation but pt not agreeable to that at this time. Labs sent. PCA for pain. Must lay flat x4hrs. Transfer orders in.

## 2023-12-23 NOTE — CARE UPDATE
Care Update 12/23 AM    Returns from OR with vascular surgery.    Large thrombus burden remaining in fem pop bypass despite thrombolysis catheter.  Anterior tibial artery fills via collaterals, severely diminutive at the level of the ankle. Open exploration of posterior tibial artery at the ankle demonstrated 1-2 mm vessel, not adequate for bypass.   May need eventual amputation.    Must lay flat for 4 hrs, can have CLD while flat if reliably able to be angled.   Will begin PCA  Labs drawn now.   Continue high intensity heparin gtt.   Can be stepped down to floor if remains normotensive.    Kush Rider M.D.  General Surgery PGY-II  [facility]

## 2023-12-23 NOTE — NURSING
Called Dr. Sow to notify of Fibrinogen of 150. Order received to pause TPA for 1 hr and restart at .25 mg/hr and check aPTT, CBC, and fibrinogen 2 hour after resuming TPA infusion.

## 2023-12-23 NOTE — PROGRESS NOTES
Anesthesia and OR nurse at bedside to take pt to surgery for angiogram. Dr. Mcdonnell at bedside to get procedure consent. Anesthesia consent in chart. Chart sent down with pt. Daughter Carolyn updated on pts condition by Dr. Mcdonnell.

## 2023-12-23 NOTE — SUBJECTIVE & OBJECTIVE
Interval History/Significant Events: NPO for OR today. TPA paused for fibrinogen <200. Cardene gtt at 5.    Follow-up For: Procedure(s) (LRB):  Thrombolysis-peripheral (Left)  PTA, Iliac Artery    Post-Operative Day: 1 Day Post-Op    Objective:     Vital Signs (Most Recent):  Temp: 99.1 °F (37.3 °C) (12/23/23 0330)  Pulse: 85 (12/23/23 0330)  Resp: 20 (12/23/23 0453)  BP: (!) 140/64 (12/23/23 0300)  SpO2: (!) 94 % (12/23/23 0330) Vital Signs (24h Range):  Temp:  [98.2 °F (36.8 °C)-99.1 °F (37.3 °C)] 99.1 °F (37.3 °C)  Pulse:  [75-94] 85  Resp:  [12-38] 20  SpO2:  [88 %-98 %] 94 %  BP: (137-209)/(63-94) 140/64  Arterial Line BP: (125-192)/(54-67) 125/54     Weight: 84 kg (185 lb 3 oz)  Body mass index is 26.57 kg/m².      Intake/Output Summary (Last 24 hours) at 12/23/2023 0702  Last data filed at 12/23/2023 0303  Gross per 24 hour   Intake 5451.17 ml   Output 1375 ml   Net 4076.17 ml          Physical Exam  Constitutional:       General: He is not in acute distress.     Appearance: Normal appearance. He is not ill-appearing.   Cardiovascular:      Rate and Rhythm: Normal rate and regular rhythm.   Pulmonary:      Effort: Pulmonary effort is normal. No respiratory distress.      Breath sounds: Normal breath sounds.   Abdominal:      General: Abdomen is flat.      Palpations: Abdomen is soft.   Musculoskeletal:         General: Normal range of motion.      Comments: Known left sided residual deficit from prior CVA  Left groin access site  Heparin and TPA catheters in place, only heparin infusing   Skin:     General: Skin is warm and dry.   Neurological:      General: No focal deficit present.      Mental Status: He is alert and oriented to person, place, and time. Mental status is at baseline.            Vents:       Lines/Drains/Airways       Drain  Duration                  Urethral Catheter 12/22/23 1345 <1 day              Arterial Line  Duration             Arterial Line 12/22/23 1100 Right Radial <1 day               Peripheral Intravenous Line  Duration                  Peripheral IV - Single Lumen 12/22/23 0023 20 G Anterior;Right Upper Arm 1 day         Peripheral IV - Single Lumen 12/22/23 0534 20 G Left;Posterior Hand 1 day         Sheath 12/22/23 0900 Right anterior;proximal <1 day                    Significant Labs:    CBC/Anemia Profile:  Recent Labs   Lab 12/22/23  1721 12/22/23  2206 12/23/23  0144   WBC 9.36 11.51 12.78*   HGB 15.2 15.4 14.9   HCT 46.3 45.5 43.5   * 117* 100*   MCV 95 90 90   RDW 15.3* 15.4* 15.4*        Chemistries:  Recent Labs   Lab 12/22/23  0349 12/22/23  1023 12/23/23  0456    139 139   K 4.4 4.1 4.0    106 111*   CO2 22* 22* 19*   BUN 19 17 16   CREATININE 0.9 0.8 0.9   CALCIUM 9.0 8.9 7.6*   ALBUMIN 3.1* 3.1* 2.5*   PROT 6.6 6.5 5.3*   BILITOT 0.5 1.1* 0.9   ALKPHOS 91 89 72   ALT 29 28 19   AST 30 30 26   MG 1.8 1.8  --    PHOS 3.6 3.5  --            Significant Imaging:  I have reviewed all pertinent imaging results/findings within the past 24 hours.

## 2023-12-23 NOTE — PLAN OF CARE
SICU PLAN OF CARE NOTE    Dx: Thrombosis of femoro-popliteal bypass graft    Goals of Care:  Keep SBP btw 100-160, Q1 Neuro checks    Vital Signs (last 12 hours):   Temp:  [98.2 °F (36.8 °C)-98.8 °F (37.1 °C)]   Pulse:  [86-94]   Resp:  [16-38]   BP: (143-162)/(63-74)   SpO2:  [88 %-96 %]   Arterial Line BP: (127-164)/(56-67)      Neuro: AAO x4, Follows Commands, and Moves All Extremities    Cardiac: NSR    Respiratory: Nasal Cannula @ 3    Gtts: Cardene and Heparin    Urine Output: Urinary Catheter 485 cc/shift      Diet: NPO       Labs/Accuchecks: Q6 aPTT, CBC, Fibrinogen    Skin:  All skin remains free from injury.  Patient turned Q2, Michelle mattress inflated, and bed working correctly.    Shift Events:  TPA stopped overnight per Dr. Sow and Heparin infusion changed to high sensitivity heparin.  See flowsheet for further assessment/details.  Patient updated on current condition/plan of care, questions answered, and emotional support provided.  MD updated on current condition, vitals, labs, and gtts.  No new orders received, will continue to monitor.

## 2023-12-23 NOTE — ANESTHESIA POSTPROCEDURE EVALUATION
Anesthesia Post Evaluation    Patient: Jamari Huerta III    Procedure(s) Performed: Procedure(s) (LRB):  Angiogram Extremity Unilateral (Left)  EXPLORATION, Left posterior tibial artery (Left)    Final Anesthesia Type: general      Patient location during evaluation: ICU  Patient participation: Yes- Able to Participate  Level of consciousness: awake and alert  Post-procedure vital signs: reviewed and stable  Pain management: adequate  Airway patency: patent    PONV status at discharge: No PONV  Anesthetic complications: no      Cardiovascular status: hemodynamically stable  Respiratory status: spontaneous ventilation  Hydration status: euvolemic  Follow-up not needed.              Vitals Value Taken Time   /80 12/23/23 1120   Temp 98.5 12/23/23 1120   Pulse 71 12/23/23 1119   Resp 30 12/23/23 1119   SpO2 88 % 12/23/23 1119   Vitals shown include unvalidated device data.      No case tracking events are documented in the log.      Pain/Yari Score: Pain Rating Prior to Med Admin: 10 (12/23/2023  7:18 AM)  Pain Rating Post Med Admin: 8 (12/23/2023  7:48 AM)  Yari Score: 10 (12/22/2023 10:04 AM)

## 2023-12-23 NOTE — NURSING
Dr. Sow at bedside, attempting to find pulses through doppler device on left foot. No pulses found.   Plan remains to perform Q1 neuro & pulses checks and notify MD for any changes in neuro status.

## 2023-12-23 NOTE — ANESTHESIA PROCEDURE NOTES
Intubation    Date/Time: 12/23/2023 9:29 AM    Performed by: Jairo Albrecht CRNA  Authorized by: Vickey Durand III, MD    Intubation:     Induction:  Intravenous    Intubated:  Postinduction    Mask Ventilation:  Easy mask    Attempts:  1    Attempted By:  CRNA    Method of Intubation:  Video laryngoscopy    Blade:  Horton 3    Laryngeal View Grade: Grade I - full view of cords      Difficult Airway Encountered?: No      Complications:  None    Airway Device:  Oral endotracheal tube    Airway Device Size:  7.5    Style/Cuff Inflation:  Cuffed    Inflation Amount (mL):  7    Tube secured:  23    Secured at:  The lips    Placement Verified By:  Capnometry and Revisualization with laryngoscopy    Complicating Factors:  None    Findings Post-Intubation:  BS equal bilateral and atraumatic/condition of teeth unchanged

## 2023-12-23 NOTE — TRANSFER OF CARE
"Anesthesia Transfer of Care Note    Patient: Jamari Huerta III    Procedure(s) Performed: Procedure(s) (LRB):  Angiogram Extremity Unilateral (Left)    Patient location: ICU    Anesthesia Type: general    Transport from OR: Transported from OR on 6-10 L/min O2 by face mask with adequate spontaneous ventilation. Continuous ECG monitoring in transport. Continuous SpO2 monitoring in transport. Continuos invasive BP monitoring in transport    Post pain: adequate analgesia    Post assessment: no apparent anesthetic complications    Post vital signs: stable    Level of consciousness: awake    Complications: none    Transfer of care protocol was followed      Last vitals: Visit Vitals  BP (!) 144/64 (BP Location: Left arm, Patient Position: Lying)   Pulse 85   Temp 36.9 °C (98.5 °F) (Oral)   Resp (!) 28   Ht 5' 10" (1.778 m)   Wt 84 kg (185 lb 3 oz)   SpO2 (!) 93%   BMI 26.57 kg/m²     "

## 2023-12-23 NOTE — ASSESSMENT & PLAN NOTE
63 M with long history of PAD, s/p SFA to above the knee popliteal artery bypass, current smoker, presents as a transfer due to concern for decreased sensation with concern for acute limb ischemia. Sensation decrease and motor weakness have been going on for months per the patient which is not consistent with acute limb ischemia and more consistent with worsening PAD. US does show that his bypass is occluded and may be causing the discomfort, edema he is experiencing currently for which he will need a thrombolysis catheter placement to declot. His pain has improved which could be secondary to starting anticoagulation. His distal left toe has a wound that has been healing which is a good sign of good perfusion.    - NPO  - Angiogram with possible thrombolysis catheter placement, possible intervention with stents or PTA this morning   - Discussed possibility that this doesn't work in restoring his LLE perfusion and he may require amputation in the future

## 2023-12-24 LAB
ALBUMIN SERPL BCP-MCNC: 2.7 G/DL (ref 3.5–5.2)
ALP SERPL-CCNC: 78 U/L (ref 55–135)
ALT SERPL W/O P-5'-P-CCNC: 17 U/L (ref 10–44)
ANION GAP SERPL CALC-SCNC: 8 MMOL/L (ref 8–16)
APTT PPP: 29.4 SEC (ref 21–32)
APTT PPP: 37.3 SEC (ref 21–32)
APTT PPP: 37.9 SEC (ref 21–32)
APTT PPP: 42 SEC (ref 21–32)
AST SERPL-CCNC: 33 U/L (ref 10–40)
BILIRUB SERPL-MCNC: 0.5 MG/DL (ref 0.1–1)
BUN SERPL-MCNC: 19 MG/DL (ref 8–23)
CALCIUM SERPL-MCNC: 8.5 MG/DL (ref 8.7–10.5)
CHLORIDE SERPL-SCNC: 107 MMOL/L (ref 95–110)
CO2 SERPL-SCNC: 22 MMOL/L (ref 23–29)
CREAT SERPL-MCNC: 0.8 MG/DL (ref 0.5–1.4)
EST. GFR  (NO RACE VARIABLE): >60 ML/MIN/1.73 M^2
GLUCOSE SERPL-MCNC: 132 MG/DL (ref 70–110)
POTASSIUM SERPL-SCNC: 4.9 MMOL/L (ref 3.5–5.1)
PROT SERPL-MCNC: 5.8 G/DL (ref 6–8.4)
SODIUM SERPL-SCNC: 137 MMOL/L (ref 136–145)

## 2023-12-24 PROCEDURE — 99900035 HC TECH TIME PER 15 MIN (STAT)

## 2023-12-24 PROCEDURE — 94761 N-INVAS EAR/PLS OXIMETRY MLT: CPT

## 2023-12-24 PROCEDURE — 63600175 PHARM REV CODE 636 W HCPCS: Performed by: STUDENT IN AN ORGANIZED HEALTH CARE EDUCATION/TRAINING PROGRAM

## 2023-12-24 PROCEDURE — 25000003 PHARM REV CODE 250: Performed by: STUDENT IN AN ORGANIZED HEALTH CARE EDUCATION/TRAINING PROGRAM

## 2023-12-24 PROCEDURE — 36415 COLL VENOUS BLD VENIPUNCTURE: CPT | Performed by: STUDENT IN AN ORGANIZED HEALTH CARE EDUCATION/TRAINING PROGRAM

## 2023-12-24 PROCEDURE — 11000001 HC ACUTE MED/SURG PRIVATE ROOM

## 2023-12-24 PROCEDURE — 80053 COMPREHEN METABOLIC PANEL: CPT | Performed by: STUDENT IN AN ORGANIZED HEALTH CARE EDUCATION/TRAINING PROGRAM

## 2023-12-24 PROCEDURE — 20600001 HC STEP DOWN PRIVATE ROOM

## 2023-12-24 PROCEDURE — 36415 COLL VENOUS BLD VENIPUNCTURE: CPT | Performed by: SURGERY

## 2023-12-24 PROCEDURE — 85730 THROMBOPLASTIN TIME PARTIAL: CPT | Mod: 91 | Performed by: SURGERY

## 2023-12-24 RX ADMIN — HEPARIN SODIUM 20.03 UNITS/KG/HR: 10000 INJECTION, SOLUTION INTRAVENOUS at 06:12

## 2023-12-24 RX ADMIN — HEPARIN SODIUM 20 UNITS/KG/HR: 10000 INJECTION, SOLUTION INTRAVENOUS at 06:12

## 2023-12-24 RX ADMIN — DIVALPROEX SODIUM 125 MG: 125 TABLET, DELAYED RELEASE ORAL at 12:12

## 2023-12-24 RX ADMIN — CARVEDILOL 6.25 MG: 6.25 TABLET, FILM COATED ORAL at 04:12

## 2023-12-24 RX ADMIN — CARVEDILOL 6.25 MG: 6.25 TABLET, FILM COATED ORAL at 10:12

## 2023-12-24 RX ADMIN — CEFAZOLIN 2 G: 2 INJECTION, POWDER, FOR SOLUTION INTRAMUSCULAR; INTRAVENOUS at 06:12

## 2023-12-24 RX ADMIN — CEFAZOLIN 2 G: 2 INJECTION, POWDER, FOR SOLUTION INTRAMUSCULAR; INTRAVENOUS at 04:12

## 2023-12-24 RX ADMIN — HYDRALAZINE HYDROCHLORIDE 10 MG: 20 INJECTION, SOLUTION INTRAMUSCULAR; INTRAVENOUS at 06:12

## 2023-12-24 RX ADMIN — DIVALPROEX SODIUM 125 MG: 125 TABLET, DELAYED RELEASE ORAL at 09:12

## 2023-12-24 RX ADMIN — Medication: at 09:12

## 2023-12-24 RX ADMIN — DIVALPROEX SODIUM 125 MG: 125 TABLET, DELAYED RELEASE ORAL at 06:12

## 2023-12-24 RX ADMIN — ATORVASTATIN CALCIUM 80 MG: 40 TABLET, FILM COATED ORAL at 10:12

## 2023-12-24 RX ADMIN — HEPARIN SODIUM 23 UNITS/KG/HR: 10000 INJECTION, SOLUTION INTRAVENOUS at 08:12

## 2023-12-24 RX ADMIN — QUETIAPINE FUMARATE 25 MG: 25 TABLET ORAL at 09:12

## 2023-12-24 RX ADMIN — PANTOPRAZOLE SODIUM 40 MG: 40 TABLET, DELAYED RELEASE ORAL at 04:12

## 2023-12-24 RX ADMIN — ASPIRIN 81 MG CHEWABLE TABLET 81 MG: 81 TABLET CHEWABLE at 10:12

## 2023-12-24 NOTE — SUBJECTIVE & OBJECTIVE
Medications Prior to Admission   Medication Sig Dispense Refill Last Dose    aspirin 81 MG Chew Take 81 mg by mouth once daily.       atorvastatin (LIPITOR) 80 MG tablet Take 1 tablet (80 mg total) by mouth once daily. 90 tablet 3     carvediloL (COREG) 6.25 MG tablet Take 6.25 mg by mouth 2 (two) times daily with meals.       divalproex (DEPAKOTE) 125 MG EC tablet Take 125 mg by mouth 3 (three) times daily.       pantoprazole (PROTONIX) 40 MG tablet Take 40 mg by mouth Daily. Indications: gastroesophageal reflux disease       QUEtiapine (SEROQUEL) 25 MG Tab 1 tab at bedtime 90 tablet 3     sacubitriL-valsartan (ENTRESTO) 24-26 mg per tablet Take 1 tablet by mouth once daily. 90 tablet 3          Review of patient's allergies indicates:   Allergen Reactions    Bupropion Swelling    Zyban [bupropion hcl (smoking deter)] Swelling    Morphine Itching and Rash       Past Medical History:   Diagnosis Date    Abnormal PFT     Cardiac murmur     Cardiomegaly     CKD (chronic kidney disease)     Coronary artery disease     Depression     DVT (deep venous thrombosis)     left lower leg    Dyspnea     Edema     Episode of transient neurologic symptoms 05/14/2019    Essential hypertension 04/15/2014    GI bleed     Heart attack 06/01/2023    approxiamate date    High cholesterol     Hypertension     Mixed hyperlipidemia 04/15/2014    PVD (peripheral vascular disease)     Stroke 06/01/2023    aprroximate date    Thrombotic stroke involving right middle cerebral artery 05/14/2019     Past Surgical History:   Procedure Laterality Date    ANGIOGRAPHY OF LOWER EXTREMITY Left 12/23/2023    Procedure: Angiogram Extremity Unilateral;  Surgeon: Wes Cobian MD;  Location: [facility] OR [address] [place] [address];  Service: Vascular;  Laterality: Left;  125.58 mGy  22.7890 Gycm2  2.0 min  95 ml dye    AORTIC VALVE SURGERY      1982 -    EXPLORATION OF FEMORAL ARTERY Left 12/23/2023    Procedure: EXPLORATION, Posterior tibial artery;  Surgeon: Wes Cobian  MD ROBINA;  Location: [facility] OR [address] [place] [address];  Service: Vascular;  Laterality: Left;    VASCULAR SURGERY      left lower leg     Family History       Problem Relation (Age of Onset)    Diabetes Mother          Tobacco Use    Smoking status: Every Day     Current packs/day: 1.00     Average packs/day: 1 pack/day for 55.0 years (55.0 ttl pk-yrs)     Types: Cigarettes     Start date: 1/1/1969    Smokeless tobacco: Never   Substance and Sexual Activity    Alcohol use: No     Comment: Hx of drinking alot when younger    Drug use: No    Sexual activity: Not on file     Review of Systems   Constitutional:  Negative for activity change, appetite change and chills.   HENT:  Negative for congestion, ear pain and hearing loss.    Eyes:  Negative for pain, discharge and itching.   Respiratory:  Negative for choking, chest tightness and shortness of breath.    Cardiovascular:  Negative for chest pain and palpitations.   Gastrointestinal:  Negative for abdominal distention, abdominal pain and anal bleeding.   Genitourinary:  Negative for difficulty urinating and dysuria.   Musculoskeletal:  Positive for myalgias (calf, shin on walking not at rest).   Neurological:  Positive for weakness (mild L>R).     Objective:     Vital Signs (Most Recent):  Temp: 97.7 °F (36.5 °C) (12/24/23 0525)  Pulse: 65 (12/24/23 0700)  Resp: 18 (12/24/23 0525)  BP: (!) 162/75 (12/24/23 0525)  SpO2: (!) 94 % (12/24/23 0525) Vital Signs (24h Range):  Temp:  [97.6 °F (36.4 °C)-99 °F (37.2 °C)] 97.7 °F (36.5 °C)  Pulse:  [59-79] 65  Resp:  [16-26] 18  SpO2:  [90 %-99 %] 94 %  BP: (135-162)/(64-75) 162/75  Arterial Line BP: (133-165)/(50-65) 139/55     Weight: 84 kg (185 lb 3 oz)  Body mass index is 26.57 kg/m².      Physical Exam  Constitutional:       Appearance: Normal appearance.   HENT:      Head: Normocephalic and atraumatic.      Nose: Nose normal.      Mouth/Throat:      Mouth: Mucous membranes are moist.      Pharynx: Oropharynx is clear.   Eyes:       Extraocular Movements: Extraocular movements intact.      Conjunctiva/sclera: Conjunctivae normal.   Cardiovascular:      Rate and Rhythm: Normal rate and regular rhythm.      Comments: The left foot is cold to the touch.      Pulmonary:      Effort: Pulmonary effort is normal.      Breath sounds: Normal breath sounds.   Abdominal:      General: Abdomen is flat.      Palpations: Abdomen is soft.   Musculoskeletal:      Comments: Tender to palpation left foot and calf  Strength diminished in left knee flexion, extension and ankle flexion and extension, chronic  Sensation dimninished in left compared to right in left lower extremity (chronic)   Skin:     General: Skin is warm and dry.      Capillary Refill: Capillary refill takes less than 2 seconds.      Comments: Large well healed scar from prior medial left leg fasciotomy    Neurological:      General: No focal deficit present.      Mental Status: He is alert.          Significant Labs:  All pertinent labs from the last 24 hours have been reviewed.    Significant Diagnostics:  I have reviewed all pertinent imaging results/findings within the past 24 hours.

## 2023-12-24 NOTE — RESPIRATORY THERAPY
"RAPID RESPONSE RESPIRATORY THERAPY ETCO2 CHECK         Time of visit: 804     Code Status: Full Code   : 1960  Bed: 1006/1006 A:   MRN: [id number]  Time spent at the bedside: < 15 min    SITUATION    Evaluated patient for: ETCo2 compliance    BACKGROUND    Why is the patient in the hospital?: Thrombosis of femoro-popliteal bypass graft    Patient has a past medical history of Abnormal PFT, Cardiac murmur, Cardiomegaly, CKD (chronic kidney disease), Coronary artery disease, Depression, DVT (deep venous thrombosis), Dyspnea, Edema, Episode of transient neurologic symptoms, Essential hypertension, GI bleed, Heart attack, High cholesterol, Hypertension, Mixed hyperlipidemia, PVD (peripheral vascular disease), Stroke, and Thrombotic stroke involving right middle cerebral artery.    24 Hours Vitals Range:  Temp:  [97.5 °F (36.4 °C)-99 °F (37.2 °C)]   Pulse:  [59-79]   Resp:  [16-26]   BP: (142-162)/(64-75)   SpO2:  [90 %-99 %]   Arterial Line BP: (133-165)/(50-65)     Labs:    Recent Labs     23  0349 23  1023 23  0456 23  1058 23  0226    139 139 139 137   K 4.4 4.1 4.0 4.5 4.9    106 111* 109 107   CO2 22* 22* 19* 22* 22*   BUN 19 17 16 16 19   CREATININE 0.9 0.8 0.9 0.8 0.8   GLU 96 93 121* 107 132*   PHOS 3.6 3.5  --  3.9  --    MG 1.8 1.8  --  1.7  --         No results for input(s): "PH", "PCO2", "PO2", "HCO3", "POCSATURATED", "BE" in the last 72 hours.    ASSESSMENT/INTERVENTIONS      Last VS   Temp: 97.5 °F (36.4 °C) (1127)  Pulse: 70 (1127)  Resp: 18 (1127)  BP: 142/72 (1127)  SpO2: 94 % (1127)  Arterial Line BP: 139/55 (2000)    Level of Consciousness: Level of Consciousness (AVPU): alert  Respiratory Effort: Respiratory Effort: Normal, Unlabored Expansion/Accessory Muscle Usage: Expansion/Accessory Muscles/Retractions: no use of accessory muscles, no retractions, expansion symmetric  All Lung Field Breath Sounds: All Lung Fields " Breath Sounds: Anterior:, Lateral:, diminished  Is the ETCO2 monitor on? Yes  Is the patient wearing a cannula? Yes  Are ETCO2 orders placed? Yes  Is the patient on a PCA pump? Yes  ETCO2 monitored: ETCO2 (mmHg): 36 mmHg  Ambu at bedside:      Active Orders   Respiratory Care    END TIDAL CO2 MONITOR Q12H     Frequency: Q12H     Number of Occurrences: Until Specified    Incentive spirometry     Frequency: Daily     Number of Occurrences: Until Specified    Oxygen Continuous     Frequency: Continuous     Number of Occurrences: Until Specified     Order Questions:      Device type: Low flow      Device: Nasal Cannula (1- 5 Liters)      LPM: 2      Titrate O2 per Oxygen Titration Protocol: Yes      To maintain SpO2 goal of: >= 90%      Notify MD of: Inability to achieve desired SpO2; Sudden change in patient status and requires 20% increase in FiO2; Patient requires >60% FiO2    Pulse Oximetry Q4H     Frequency: Q4H     Number of Occurrences: Until Specified       RECOMMENDATIONS    We recommend: RRT Recs: Continue POC per primary team.      FOLLOW-UP    Please call back the Rapid Response RTPhi RRT at x [phone] for any questions or concerns.

## 2023-12-24 NOTE — NURSING TRANSFER
Nursing Transfer Note      12/23/2023   8:15 PM    Nurse giving handoff:SOULEYMANE Wright RN  Nurse receiving handoff: ANKITA Foote RN    Reason patient is being transferred:  Stepdown in level of care    Transfer from [address]20 to 1006    Transfer via bed    Transfer with O2, cardiac monitoring    Transported by:  SOULEYMANE Wright RN and ELLA Sosa    Transfer Vital Signs:  Blood Pressure: 152/72  Heart Rate:76  O2: 95% on 2L n/c  Temperature: 99.0 oral  Respirations:20    Telemetry: Rate : 76, NSR  Order for Tele Monitor? Yes    Additional Lines: Oxygen and Guido Catheter    4eyes on Skin: yes    Medicines sent: yes    Any special needs or follow-up needed: No    Patient belongings transferred with patient: Yes    Chart send with patient: Yes    Notified: Ex-wife and sister    Patient reassessed at: 2000 12/23/23  1  Upon arrival to floor: patient oriented to room, call bell in reach, side rails elevatedand bed in lowest position.  IVF's connected, doppler pulses to RLE verified, and no pulses to LLE noted.  Dilaudid PCA button given to pt.  Renotified of APTT ordered for 2300 with change from nurse draw to lab collection needed.  Pt phoned exwife and notified again of new room number.  Questions answered and contact phone number given.

## 2023-12-24 NOTE — ASSESSMENT & PLAN NOTE
63 M with long history of PAD, s/p SFA to above the knee popliteal artery bypass, current smoker, presents as a transfer due to concern for decreased sensation with concern for acute limb ischemia. Sensation decrease and motor weakness have been going on for months per the patient which is not consistent with acute limb ischemia and more consistent with worsening PAD. US does show that his bypass is occluded and may be causing the discomfort, edema he is experiencing currently for which he will need a thrombolysis catheter placement to declot. His pain has improved which could be secondary to starting anticoagulation. His distal left toe has a wound that has been healing which is a good sign of good perfusion.    -regular diet   -continue aspirin, statin.  -keep dressing clean and dry.  -patient will likely require an amputation.  He will think on this.  If he decides that he is amenable to an amputation, we will plan for surgical timing with staff pending OR availability.

## 2023-12-24 NOTE — PROGRESS NOTES
[facility]  Vascular Surgery  Progress Note    Patient Name: Jamari Huerta III  MRN: [id number]  Admission Date: 12/21/2023  Primary Care Provider: Nataliya Anderson MD    Subjective:     Interval History:  No acute events overnight.  Patient still does not want an amputation at this time.  He will discuss with family.  States that he still has fairly severe left leg pain.    Post-Op Info:  Procedure(s) (LRB):  Angiogram Extremity Unilateral (Left)  EXPLORATION, Posterior tibial artery (Left)   1 Day Post-Op     Medications Prior to Admission   Medication Sig Dispense Refill Last Dose    aspirin 81 MG Chew Take 81 mg by mouth once daily.       atorvastatin (LIPITOR) 80 MG tablet Take 1 tablet (80 mg total) by mouth once daily. 90 tablet 3     carvediloL (COREG) 6.25 MG tablet Take 6.25 mg by mouth 2 (two) times daily with meals.       divalproex (DEPAKOTE) 125 MG EC tablet Take 125 mg by mouth 3 (three) times daily.       pantoprazole (PROTONIX) 40 MG tablet Take 40 mg by mouth Daily. Indications: gastroesophageal reflux disease       QUEtiapine (SEROQUEL) 25 MG Tab 1 tab at bedtime 90 tablet 3     sacubitriL-valsartan (ENTRESTO) 24-26 mg per tablet Take 1 tablet by mouth once daily. 90 tablet 3          Review of patient's allergies indicates:   Allergen Reactions    Bupropion Swelling    Zyban [bupropion hcl (smoking deter)] Swelling    Morphine Itching and Rash       Past Medical History:   Diagnosis Date    Abnormal PFT     Cardiac murmur     Cardiomegaly     CKD (chronic kidney disease)     Coronary artery disease     Depression     DVT (deep venous thrombosis)     left lower leg    Dyspnea     Edema     Episode of transient neurologic symptoms 05/14/2019    Essential hypertension 04/15/2014    GI bleed     Heart attack 06/01/2023    approxiamate date    High cholesterol     Hypertension     Mixed hyperlipidemia 04/15/2014    PVD (peripheral vascular disease)     Stroke 06/01/2023    aprroximate date     Thrombotic stroke involving right middle cerebral artery 05/14/2019     Past Surgical History:   Procedure Laterality Date    ANGIOGRAPHY OF LOWER EXTREMITY Left 12/23/2023    Procedure: Angiogram Extremity Unilateral;  Surgeon: Wes Cobian MD;  Location: [address];  Service: Vascular;  Laterality: Left;  125.58 mGy  22.7890 Gycm2  2.0 min  95 ml dye    AORTIC VALVE SURGERY      1982 -    EXPLORATION OF FEMORAL ARTERY Left 12/23/2023    Procedure: EXPLORATION, Posterior tibial artery;  Surgeon: Wes Cobian MD;  Location: [address];  Service: Vascular;  Laterality: Left;    VASCULAR SURGERY      left lower leg     Family History       Problem Relation (Age of Onset)    Diabetes Mother          Tobacco Use    Smoking status: Every Day     Current packs/day: 1.00     Average packs/day: 1 pack/day for 55.0 years (55.0 ttl pk-yrs)     Types: Cigarettes     Start date: 1/1/1969    Smokeless tobacco: Never   Substance and Sexual Activity    Alcohol use: No     Comment: Hx of drinking alot when younger    Drug use: No    Sexual activity: Not on file     Review of Systems   Constitutional:  Negative for activity change, appetite change and chills.   HENT:  Negative for congestion, ear pain and hearing loss.    Eyes:  Negative for pain, discharge and itching.   Respiratory:  Negative for choking, chest tightness and shortness of breath.    Cardiovascular:  Negative for chest pain and palpitations.   Gastrointestinal:  Negative for abdominal distention, abdominal pain and anal bleeding.   Genitourinary:  Negative for difficulty urinating and dysuria.   Musculoskeletal:  Positive for myalgias (calf, shin on walking not at rest).   Neurological:  Positive for weakness (mild L>R).     Objective:     Vital Signs (Most Recent):  Temp: 97.7 °F (36.5 °C) (12/24/23 0525)  Pulse: 65 (12/24/23 0700)  Resp: 18 (12/24/23 0525)  BP: (!) 162/75 (12/24/23 0525)  SpO2: (!) 94 % (12/24/23 0525) Vital Signs (24h  Range):  Temp:  [97.6 °F (36.4 °C)-99 °F (37.2 °C)] 97.7 °F (36.5 °C)  Pulse:  [59-79] 65  Resp:  [16-26] 18  SpO2:  [90 %-99 %] 94 %  BP: (135-162)/(64-75) 162/75  Arterial Line BP: (133-165)/(50-65) 139/55     Weight: 84 kg (185 lb 3 oz)  Body mass index is 26.57 kg/m².      Physical Exam  Constitutional:       Appearance: Normal appearance.   HENT:      Head: Normocephalic and atraumatic.      Nose: Nose normal.      Mouth/Throat:      Mouth: Mucous membranes are moist.      Pharynx: Oropharynx is clear.   Eyes:      Extraocular Movements: Extraocular movements intact.      Conjunctiva/sclera: Conjunctivae normal.   Cardiovascular:      Rate and Rhythm: Normal rate and regular rhythm.      Comments: The left foot is cold to the touch.      Pulmonary:      Effort: Pulmonary effort is normal.      Breath sounds: Normal breath sounds.   Abdominal:      General: Abdomen is flat.      Palpations: Abdomen is soft.   Musculoskeletal:      Comments: Tender to palpation left foot and calf  Strength diminished in left knee flexion, extension and ankle flexion and extension, chronic  Sensation dimninished in left compared to right in left lower extremity (chronic)   Skin:     General: Skin is warm and dry.      Capillary Refill: Capillary refill takes less than 2 seconds.      Comments: Large well healed scar from prior medial left leg fasciotomy    Neurological:      General: No focal deficit present.      Mental Status: He is alert.          Significant Labs:  All pertinent labs from the last 24 hours have been reviewed.    Significant Diagnostics:  I have reviewed all pertinent imaging results/findings within the past 24 hours.  Assessment/Plan:     * Thrombosis of femoro-popliteal bypass graft  63 M with long history of PAD, s/p SFA to above the knee popliteal artery bypass, current smoker, presents as a transfer due to concern for decreased sensation with concern for acute limb ischemia. Sensation decrease and motor  weakness have been going on for months per the patient which is not consistent with acute limb ischemia and more consistent with worsening PAD. US does show that his bypass is occluded and may be causing the discomfort, edema he is experiencing currently for which he will need a thrombolysis catheter placement to declot. His pain has improved which could be secondary to starting anticoagulation. His distal left toe has a wound that has been healing which is a good sign of good perfusion.    -regular diet   -continue aspirin, statin.  -keep dressing clean and dry.  -patient will likely require an amputation.  He will think on this.  If he decides that he is amenable to an amputation, we will plan for surgical timing with staff pending OR availability.          JAKE Mcdonnell MD  Vascular Surgery  [facility]

## 2023-12-25 LAB
ABO + RH BLD: NORMAL
ALBUMIN SERPL BCP-MCNC: 2.7 G/DL (ref 3.5–5.2)
ALP SERPL-CCNC: 81 U/L (ref 55–135)
ALT SERPL W/O P-5'-P-CCNC: 23 U/L (ref 10–44)
ANION GAP SERPL CALC-SCNC: 7 MMOL/L (ref 8–16)
APTT PPP: 29.2 SEC (ref 21–32)
APTT PPP: 34.3 SEC (ref 21–32)
APTT PPP: 47.6 SEC (ref 21–32)
AST SERPL-CCNC: 34 U/L (ref 10–40)
BASOPHILS # BLD AUTO: 0.03 K/UL (ref 0–0.2)
BASOPHILS NFR BLD: 0.3 % (ref 0–1.9)
BILIRUB SERPL-MCNC: 0.4 MG/DL (ref 0.1–1)
BLD GP AB SCN CELLS X3 SERPL QL: NORMAL
BUN SERPL-MCNC: 27 MG/DL (ref 8–23)
CALCIUM SERPL-MCNC: 8.3 MG/DL (ref 8.7–10.5)
CHLORIDE SERPL-SCNC: 104 MMOL/L (ref 95–110)
CO2 SERPL-SCNC: 24 MMOL/L (ref 23–29)
CREAT SERPL-MCNC: 0.8 MG/DL (ref 0.5–1.4)
DIFFERENTIAL METHOD BLD: ABNORMAL
EOSINOPHIL # BLD AUTO: 0.1 K/UL (ref 0–0.5)
EOSINOPHIL NFR BLD: 1 % (ref 0–8)
ERYTHROCYTE [DISTWIDTH] IN BLOOD BY AUTOMATED COUNT: 15.8 % (ref 11.5–14.5)
EST. GFR  (NO RACE VARIABLE): >60 ML/MIN/1.73 M^2
GLUCOSE SERPL-MCNC: 136 MG/DL (ref 70–110)
HCT VFR BLD AUTO: 39.7 % (ref 40–54)
HGB BLD-MCNC: 13.2 G/DL (ref 14–18)
IMM GRANULOCYTES # BLD AUTO: 0.09 K/UL (ref 0–0.04)
IMM GRANULOCYTES NFR BLD AUTO: 0.8 % (ref 0–0.5)
LYMPHOCYTES # BLD AUTO: 0.9 K/UL (ref 1–4.8)
LYMPHOCYTES NFR BLD: 7.7 % (ref 18–48)
MCH RBC QN AUTO: 31.4 PG (ref 27–31)
MCHC RBC AUTO-ENTMCNC: 33.2 G/DL (ref 32–36)
MCV RBC AUTO: 94 FL (ref 82–98)
MONOCYTES # BLD AUTO: 0.8 K/UL (ref 0.3–1)
MONOCYTES NFR BLD: 7.3 % (ref 4–15)
NEUTROPHILS # BLD AUTO: 9.6 K/UL (ref 1.8–7.7)
NEUTROPHILS NFR BLD: 82.9 % (ref 38–73)
NRBC BLD-RTO: 0 /100 WBC
PLATELET # BLD AUTO: 83 K/UL (ref 150–450)
PMV BLD AUTO: 10.7 FL (ref 9.2–12.9)
POTASSIUM SERPL-SCNC: 4.6 MMOL/L (ref 3.5–5.1)
PROT SERPL-MCNC: 5.7 G/DL (ref 6–8.4)
RBC # BLD AUTO: 4.21 M/UL (ref 4.6–6.2)
SODIUM SERPL-SCNC: 135 MMOL/L (ref 136–145)
SPECIMEN OUTDATE: NORMAL
WBC # BLD AUTO: 11.53 K/UL (ref 3.9–12.7)

## 2023-12-25 PROCEDURE — 36415 COLL VENOUS BLD VENIPUNCTURE: CPT | Performed by: STUDENT IN AN ORGANIZED HEALTH CARE EDUCATION/TRAINING PROGRAM

## 2023-12-25 PROCEDURE — 25000003 PHARM REV CODE 250: Performed by: STUDENT IN AN ORGANIZED HEALTH CARE EDUCATION/TRAINING PROGRAM

## 2023-12-25 PROCEDURE — 85730 THROMBOPLASTIN TIME PARTIAL: CPT | Performed by: SURGERY

## 2023-12-25 PROCEDURE — 86901 BLOOD TYPING SEROLOGIC RH(D): CPT | Performed by: STUDENT IN AN ORGANIZED HEALTH CARE EDUCATION/TRAINING PROGRAM

## 2023-12-25 PROCEDURE — 80053 COMPREHEN METABOLIC PANEL: CPT | Performed by: STUDENT IN AN ORGANIZED HEALTH CARE EDUCATION/TRAINING PROGRAM

## 2023-12-25 PROCEDURE — 63600175 PHARM REV CODE 636 W HCPCS: Performed by: STUDENT IN AN ORGANIZED HEALTH CARE EDUCATION/TRAINING PROGRAM

## 2023-12-25 PROCEDURE — 11000001 HC ACUTE MED/SURG PRIVATE ROOM

## 2023-12-25 PROCEDURE — 86022 PLATELET ANTIBODIES: CPT | Performed by: STUDENT IN AN ORGANIZED HEALTH CARE EDUCATION/TRAINING PROGRAM

## 2023-12-25 PROCEDURE — 36415 COLL VENOUS BLD VENIPUNCTURE: CPT | Performed by: SURGERY

## 2023-12-25 PROCEDURE — 20600001 HC STEP DOWN PRIVATE ROOM

## 2023-12-25 PROCEDURE — 85025 COMPLETE CBC W/AUTO DIFF WBC: CPT | Performed by: STUDENT IN AN ORGANIZED HEALTH CARE EDUCATION/TRAINING PROGRAM

## 2023-12-25 RX ORDER — ACETAMINOPHEN 500 MG
1000 TABLET ORAL 3 TIMES DAILY
Status: DISCONTINUED | OUTPATIENT
Start: 2023-12-26 | End: 2024-01-05 | Stop reason: HOSPADM

## 2023-12-25 RX ORDER — DIPHENHYDRAMINE HCL 25 MG
25 CAPSULE ORAL ONCE
Status: COMPLETED | OUTPATIENT
Start: 2023-12-26 | End: 2023-12-25

## 2023-12-25 RX ORDER — OXYCODONE HYDROCHLORIDE 10 MG/1
10 TABLET ORAL ONCE
Status: COMPLETED | OUTPATIENT
Start: 2023-12-26 | End: 2023-12-25

## 2023-12-25 RX ORDER — ACETAMINOPHEN 325 MG/1
650 TABLET ORAL 3 TIMES DAILY
Status: DISCONTINUED | OUTPATIENT
Start: 2023-12-26 | End: 2023-12-25

## 2023-12-25 RX ADMIN — CARVEDILOL 6.25 MG: 6.25 TABLET, FILM COATED ORAL at 04:12

## 2023-12-25 RX ADMIN — ATORVASTATIN CALCIUM 80 MG: 40 TABLET, FILM COATED ORAL at 07:12

## 2023-12-25 RX ADMIN — PANTOPRAZOLE SODIUM 40 MG: 40 TABLET, DELAYED RELEASE ORAL at 04:12

## 2023-12-25 RX ADMIN — DIVALPROEX SODIUM 125 MG: 125 TABLET, DELAYED RELEASE ORAL at 07:12

## 2023-12-25 RX ADMIN — DIPHENHYDRAMINE HYDROCHLORIDE 25 MG: 25 CAPSULE ORAL at 11:12

## 2023-12-25 RX ADMIN — HEPARIN SODIUM 23 UNITS/KG/HR: 10000 INJECTION, SOLUTION INTRAVENOUS at 03:12

## 2023-12-25 RX ADMIN — CEFAZOLIN 2 G: 2 INJECTION, POWDER, FOR SOLUTION INTRAMUSCULAR; INTRAVENOUS at 12:12

## 2023-12-25 RX ADMIN — CARVEDILOL 6.25 MG: 6.25 TABLET, FILM COATED ORAL at 07:12

## 2023-12-25 RX ADMIN — CEFAZOLIN 2 G: 2 INJECTION, POWDER, FOR SOLUTION INTRAMUSCULAR; INTRAVENOUS at 06:12

## 2023-12-25 RX ADMIN — Medication: at 09:12

## 2023-12-25 RX ADMIN — ASPIRIN 81 MG CHEWABLE TABLET 81 MG: 81 TABLET CHEWABLE at 07:12

## 2023-12-25 RX ADMIN — QUETIAPINE FUMARATE 25 MG: 25 TABLET ORAL at 08:12

## 2023-12-25 RX ADMIN — CEFAZOLIN 2 G: 2 INJECTION, POWDER, FOR SOLUTION INTRAMUSCULAR; INTRAVENOUS at 02:12

## 2023-12-25 RX ADMIN — DIVALPROEX SODIUM 125 MG: 125 TABLET, DELAYED RELEASE ORAL at 09:12

## 2023-12-25 RX ADMIN — DIVALPROEX SODIUM 125 MG: 125 TABLET, DELAYED RELEASE ORAL at 04:12

## 2023-12-25 RX ADMIN — HEPARIN SODIUM 26 UNITS/KG/HR: 10000 INJECTION, SOLUTION INTRAVENOUS at 11:12

## 2023-12-25 RX ADMIN — CEFAZOLIN 2 G: 2 INJECTION, POWDER, FOR SOLUTION INTRAMUSCULAR; INTRAVENOUS at 10:12

## 2023-12-25 RX ADMIN — LABETALOL HYDROCHLORIDE 10 MG: 5 INJECTION, SOLUTION INTRAVENOUS at 10:12

## 2023-12-25 RX ADMIN — OXYCODONE HYDROCHLORIDE 10 MG: 10 TABLET ORAL at 11:12

## 2023-12-25 RX ADMIN — HYDRALAZINE HYDROCHLORIDE 10 MG: 20 INJECTION, SOLUTION INTRAMUSCULAR; INTRAVENOUS at 08:12

## 2023-12-25 RX ADMIN — HYDROXYZINE HYDROCHLORIDE 25 MG: 25 TABLET ORAL at 07:12

## 2023-12-25 NOTE — PROGRESS NOTES
[facility] - [facility]  Vascular Surgery  Progress Note    Patient Name: Jamari Huerta III  MRN: [id number]  Admission Date: 12/21/2023  Primary Care Provider: Nataliya Anderson MD    Subjective:     Interval History:  No acute events overnight, patient still having pain at rest currently 8/10 with his PCA pump.  States he would be amenable to an above the knee amputation.  No angina, dyspnea, fevers, chills, sweats.    Post-Op Info:  Procedure(s) (LRB):  Angiogram Extremity Unilateral (Left)  EXPLORATION, Posterior tibial artery (Left)   2 Days Post-Op     Medications:  Continuous Infusions:   heparin (porcine) in D5W 23 Units/kg/hr (12/25/23 0311)    hydromorphone in 0.9 % NaCl 6 mg/30 ml      nicardipine Stopped (12/23/23 0745)     Scheduled Meds:   aspirin  81 mg Oral Daily    atorvastatin  80 mg Oral Daily    carvediloL  6.25 mg Oral BID WM    ceFAZolin (ANCEF) IVPB  2 g Intravenous Q8H    divalproex  125 mg Oral TID    pantoprazole  40 mg Oral Daily    QUEtiapine  25 mg Oral QHS     PRN Meds:acetaminophen, hydrALAZINE, HYDROcodone-acetaminophen, HYDROmorphone, hydrOXYzine, labetalol, melatonin, naloxone, ondansetron, promethazine, sodium chloride 0.9%     Objective:     Vital Signs (Most Recent):  Temp: 98.2 °F (36.8 °C) (12/25/23 0506)  Pulse: 63 (12/25/23 0506)  Resp: 18 (12/25/23 0506)  BP: 124/68 (12/25/23 0506)  SpO2: (!) 93 % (12/25/23 0506) Vital Signs (24h Range):  Temp:  [97.5 °F (36.4 °C)-98.6 °F (37 °C)] 98.2 °F (36.8 °C)  Pulse:  [59-70] 63  Resp:  [17-18] 18  SpO2:  [93 %-95 %] 93 %  BP: (124-162)/(62-86) 124/68          Physical Exam  Constitutional:       Appearance: Normal appearance.   HENT:      Head: Normocephalic and atraumatic.      Nose: Nose normal.      Mouth/Throat:      Mouth: Mucous membranes are moist.      Pharynx: Oropharynx is clear.   Eyes:      Extraocular Movements: Extraocular movements intact.      Conjunctiva/sclera: Conjunctivae normal.   Cardiovascular:      Rate and Rhythm:  Normal rate and regular rhythm.      Comments: The left foot is cold to the touch.      Pulmonary:      Effort: Pulmonary effort is normal.      Breath sounds: Normal breath sounds.   Abdominal:      General: Abdomen is flat.      Palpations: Abdomen is soft.   Musculoskeletal:      Comments: Tender to palpation left foot and calf  Strength diminished in left knee flexion, extension and ankle flexion and extension, chronic  Sensation dimninished in left compared to right in left lower extremity (chronic)   Skin:     General: Skin is warm and dry.      Capillary Refill: Capillary refill takes less than 2 seconds.      Comments: Large well healed scar from prior medial left leg fasciotomy    Neurological:      General: No focal deficit present.      Mental Status: He is alert.          Significant Labs:  Recent Lab Results         12/25/23  0315   12/24/23  1851   12/24/23  1039        Albumin 2.7           ALP 81           ALT 23           Anion Gap 7           aPTT 47.6  Comment: Refer to local heparin nomogram for intensity/dose specific   therapeutic   range.     29.4  Comment: Refer to local heparin nomogram for intensity/dose specific   therapeutic   range.     37.3  Comment: Refer to local heparin nomogram for intensity/dose specific   therapeutic   range.         AST 34           Baso # 0.03           Basophil % 0.3           BILIRUBIN TOTAL 0.4  Comment: For infants and newborns, interpretation of results should be based  on gestational age, weight and in agreement with clinical  observations.    Premature Infant recommended reference ranges:  Up to 24 hours.............<8.0 mg/dL  Up to 48 hours............<12.0 mg/dL  3-5 days..................<15.0 mg/dL  6-29 days.................<15.0 mg/dL             BUN 27           Calcium 8.3           Chloride 104           CO2 24           Creatinine 0.8           Differential Method Automated           eGFR >60.0           Eos # 0.1           Eosinophil % 1.0            Glucose 136           Gran # (ANC) 9.6           Gran % 82.9           Hematocrit 39.7           Hemoglobin 13.2           Immature Grans (Abs) 0.09  Comment: Mild elevation in immature granulocytes is non specific and   can be seen in a variety of conditions including stress response,   acute inflammation, trauma and pregnancy. Correlation with other   laboratory and clinical findings is essential.             Immature Granulocytes 0.8           Lymph # 0.9           Lymph % 7.7           MCH 31.4           MCHC 33.2           MCV 94           Mono # 0.8           Mono % 7.3           MPV 10.7           nRBC 0           Platelet Count 83           Potassium 4.6           PROTEIN TOTAL 5.7           RBC 4.21           RDW 15.8           Sodium 135           WBC 11.53                   Significant Diagnostics:  I have reviewed all pertinent imaging results/findings within the past 24 hours.  Assessment/Plan:     * Thrombosis of femoro-popliteal bypass graft  63 M with long history of PAD, s/p SFA to above the knee popliteal artery bypass, current smoker, presents as a transfer due to concern for decreased sensation with concern for rest pain secondary to occluded bypass graft now s/p angiogram, thrombolysis, and posterior tibial artery exploration.    Unfortunately, no good options for revascularization of this patients left leg. Will plan on left above knee amputation, timing to be determined. After long discussion with patient this morning he is amenable to that surgery.     -regular diet   -continue aspirin, statin, heparin gtt  -Pain control with multimodal and PCA  -keep dressing clean and dry.  -Surgical timing to be determined        CORBY DE LUNA MD  Vascular Surgery  [facility]

## 2023-12-25 NOTE — SUBJECTIVE & OBJECTIVE
Medications:  Continuous Infusions:   heparin (porcine) in D5W 23 Units/kg/hr (12/25/23 0311)    hydromorphone in 0.9 % NaCl 6 mg/30 ml      nicardipine Stopped (12/23/23 0745)     Scheduled Meds:   aspirin  81 mg Oral Daily    atorvastatin  80 mg Oral Daily    carvediloL  6.25 mg Oral BID WM    ceFAZolin (ANCEF) IVPB  2 g Intravenous Q8H    divalproex  125 mg Oral TID    pantoprazole  40 mg Oral Daily    QUEtiapine  25 mg Oral QHS     PRN Meds:acetaminophen, hydrALAZINE, HYDROcodone-acetaminophen, HYDROmorphone, hydrOXYzine, labetalol, melatonin, naloxone, ondansetron, promethazine, sodium chloride 0.9%     Objective:     Vital Signs (Most Recent):  Temp: 98.2 °F (36.8 °C) (12/25/23 0506)  Pulse: 63 (12/25/23 0506)  Resp: 18 (12/25/23 0506)  BP: 124/68 (12/25/23 0506)  SpO2: (!) 93 % (12/25/23 0506) Vital Signs (24h Range):  Temp:  [97.5 °F (36.4 °C)-98.6 °F (37 °C)] 98.2 °F (36.8 °C)  Pulse:  [59-70] 63  Resp:  [17-18] 18  SpO2:  [93 %-95 %] 93 %  BP: (124-162)/(62-86) 124/68          Physical Exam  Constitutional:       Appearance: Normal appearance.   HENT:      Head: Normocephalic and atraumatic.      Nose: Nose normal.      Mouth/Throat:      Mouth: Mucous membranes are moist.      Pharynx: Oropharynx is clear.   Eyes:      Extraocular Movements: Extraocular movements intact.      Conjunctiva/sclera: Conjunctivae normal.   Cardiovascular:      Rate and Rhythm: Normal rate and regular rhythm.      Comments: The left foot is cold to the touch.      Pulmonary:      Effort: Pulmonary effort is normal.      Breath sounds: Normal breath sounds.   Abdominal:      General: Abdomen is flat.      Palpations: Abdomen is soft.   Musculoskeletal:      Comments: Tender to palpation left foot and calf  Strength diminished in left knee flexion, extension and ankle flexion and extension, chronic  Sensation dimninished in left compared to right in left lower extremity (chronic)   Skin:     General: Skin is warm and dry.       Capillary Refill: Capillary refill takes less than 2 seconds.      Comments: Large well healed scar from prior medial left leg fasciotomy    Neurological:      General: No focal deficit present.      Mental Status: He is alert.          Significant Labs:  Recent Lab Results         12/25/23  0315   12/24/23  1851   12/24/23  1039        Albumin 2.7           ALP 81           ALT 23           Anion Gap 7           aPTT 47.6  Comment: Refer to local heparin nomogram for intensity/dose specific   therapeutic   range.     29.4  Comment: Refer to local heparin nomogram for intensity/dose specific   therapeutic   range.     37.3  Comment: Refer to local heparin nomogram for intensity/dose specific   therapeutic   range.         AST 34           Baso # 0.03           Basophil % 0.3           BILIRUBIN TOTAL 0.4  Comment: For infants and newborns, interpretation of results should be based  on gestational age, weight and in agreement with clinical  observations.    Premature Infant recommended reference ranges:  Up to 24 hours.............<8.0 mg/dL  Up to 48 hours............<12.0 mg/dL  3-5 days..................<15.0 mg/dL  6-29 days.................<15.0 mg/dL             BUN 27           Calcium 8.3           Chloride 104           CO2 24           Creatinine 0.8           Differential Method Automated           eGFR >60.0           Eos # 0.1           Eosinophil % 1.0           Glucose 136           Gran # (ANC) 9.6           Gran % 82.9           Hematocrit 39.7           Hemoglobin 13.2           Immature Grans (Abs) 0.09  Comment: Mild elevation in immature granulocytes is non specific and   can be seen in a variety of conditions including stress response,   acute inflammation, trauma and pregnancy. Correlation with other   laboratory and clinical findings is essential.             Immature Granulocytes 0.8           Lymph # 0.9           Lymph % 7.7           MCH 31.4           MCHC 33.2           MCV 94            Mono # 0.8           Mono % 7.3           MPV 10.7           nRBC 0           Platelet Count 83           Potassium 4.6           PROTEIN TOTAL 5.7           RBC 4.21           RDW 15.8           Sodium 135           WBC 11.53                   Significant Diagnostics:  I have reviewed all pertinent imaging results/findings within the past 24 hours.

## 2023-12-25 NOTE — PLAN OF CARE
[facility] Plan of Care Note  Dx Thrombosis of femoro-popliteal bypass graft     Shift Events Marie discontinued /Heparin drip on hold per MD orders     Goals of Care: pain management, Heparin drip     Neuro: AAO X 4     Vital Signs: VSS     Respiratory: 2L     Diet: reg      Is patient tolerating current diet? yes     GTTS: Heparin @ 17.8     Urine Output/Bowel Movement: adequate urine output with marie catheter     Drains/Tubes/Tube Feeds (include total output/shift): none     Lines: R upper arm 20g / R FA 20/ L hand 20g        Accuchecks:none     Skin: incision to R groin      Fall Risk Score: 18     Activity level? Up with assist X 1     Any scheduled procedures? Possible surgery     Any safety concerns? Fall precautions/ bleeding precautions     Other: Patient NPO @ MN 12/26/23           Problem: Adult Inpatient Plan of Care  Goal: Plan of Care Review  Outcome: Ongoing, Progressing  Goal: Patient-Specific Goal (Individualized)  Outcome: Ongoing, Progressing  Goal: Absence of Hospital-Acquired Illness or Injury  Outcome: Ongoing, Progressing  Goal: Optimal Comfort and Wellbeing  Outcome: Ongoing, Progressing  Goal: Readiness for Transition of Care  Outcome: Ongoing, Progressing     Problem: Impaired Wound Healing  Goal: Optimal Wound Healing  Outcome: Ongoing, Progressing     Problem: Infection  Goal: Absence of Infection Signs and Symptoms  Outcome: Ongoing, Progressing     Problem: Skin Injury Risk Increased  Goal: Skin Health and Integrity  Outcome: Ongoing, Progressing     Problem: Fall Injury Risk  Goal: Absence of Fall and Fall-Related Injury  Outcome: Ongoing, Progressing

## 2023-12-25 NOTE — NURSING
[facility] Plan of Care Note  Dx Thrombosis of femoro-popliteal bypass graft     Shift Events: LLE incision started to bleed. MD assessed. ABD and compression wrap applied. Pt requested nicotine patch MD denied dt pending procedure. Pt voiding well.      Goals of Care: pain management, Heparin gtt     Neuro: AAO X 4     Vital Signs: VSS     Respiratory: ra     Diet: reg      Is patient tolerating current diet? yes     GTTS: Heparin @ 17.8, PCA in use     Urine Output/Bowel Movement: Last BM 12/21, voiding well     Drains/Tubes/Tube Feeds (include total output/shift): none     Lines: R upper arm 20g / R FA 20        Accuchecks:none     Skin: incision to R groin, incision to Left foot      Fall Risk Score: 18     Activity level? Up with assist X 1     Any scheduled procedures? AKA scheduled for 12/26      Any safety concerns? Fall precautions/ bleeding precautions     Other: Patient NPO @ MN 12/26/23

## 2023-12-25 NOTE — ASSESSMENT & PLAN NOTE
63 M with long history of PAD, s/p SFA to above the knee popliteal artery bypass, current smoker, presents as a transfer due to concern for decreased sensation with concern for rest pain secondary to occluded bypass graft now s/p angiogram, thrombolysis, and posterior tibial artery exploration.    Unfortunately, no good options for revascularization of this patients left leg. Will plan on left above knee amputation, timing to be determined. After long discussion with patient this morning he is amenable to that surgery.     -regular diet   -continue aspirin, statin, heparin gtt  -Pain control with multimodal and PCA  -keep dressing clean and dry.  -Surgical timing to be determined

## 2023-12-26 LAB
ALBUMIN SERPL BCP-MCNC: 3 G/DL (ref 3.5–5.2)
ALP SERPL-CCNC: 97 U/L (ref 55–135)
ALT SERPL W/O P-5'-P-CCNC: 23 U/L (ref 10–44)
ANION GAP SERPL CALC-SCNC: 8 MMOL/L (ref 8–16)
APTT PPP: 25.6 SEC (ref 21–32)
APTT PPP: 25.8 SEC (ref 21–32)
APTT PPP: 36 SEC (ref 21–32)
AST SERPL-CCNC: 44 U/L (ref 10–40)
BASOPHILS # BLD AUTO: 0.06 K/UL (ref 0–0.2)
BASOPHILS # BLD AUTO: 0.07 K/UL (ref 0–0.2)
BASOPHILS NFR BLD: 0.6 % (ref 0–1.9)
BASOPHILS NFR BLD: 0.7 % (ref 0–1.9)
BILIRUB SERPL-MCNC: 1 MG/DL (ref 0.1–1)
BUN SERPL-MCNC: 17 MG/DL (ref 8–23)
CALCIUM SERPL-MCNC: 9.2 MG/DL (ref 8.7–10.5)
CHLORIDE SERPL-SCNC: 102 MMOL/L (ref 95–110)
CO2 SERPL-SCNC: 27 MMOL/L (ref 23–29)
CREAT SERPL-MCNC: 0.8 MG/DL (ref 0.5–1.4)
DIFFERENTIAL METHOD BLD: ABNORMAL
DIFFERENTIAL METHOD BLD: ABNORMAL
EOSINOPHIL # BLD AUTO: 0.4 K/UL (ref 0–0.5)
EOSINOPHIL # BLD AUTO: 0.4 K/UL (ref 0–0.5)
EOSINOPHIL NFR BLD: 3.8 % (ref 0–8)
EOSINOPHIL NFR BLD: 3.9 % (ref 0–8)
ERYTHROCYTE [DISTWIDTH] IN BLOOD BY AUTOMATED COUNT: 15.6 % (ref 11.5–14.5)
ERYTHROCYTE [DISTWIDTH] IN BLOOD BY AUTOMATED COUNT: 15.6 % (ref 11.5–14.5)
EST. GFR  (NO RACE VARIABLE): >60 ML/MIN/1.73 M^2
GLUCOSE SERPL-MCNC: 100 MG/DL (ref 70–110)
HCT VFR BLD AUTO: 39.1 % (ref 40–54)
HCT VFR BLD AUTO: 43.2 % (ref 40–54)
HGB BLD-MCNC: 13.4 G/DL (ref 14–18)
HGB BLD-MCNC: 14.2 G/DL (ref 14–18)
IMM GRANULOCYTES # BLD AUTO: 0.1 K/UL (ref 0–0.04)
IMM GRANULOCYTES # BLD AUTO: 0.13 K/UL (ref 0–0.04)
IMM GRANULOCYTES NFR BLD AUTO: 1 % (ref 0–0.5)
IMM GRANULOCYTES NFR BLD AUTO: 1.3 % (ref 0–0.5)
INR PPP: 1 (ref 0.8–1.2)
INR PPP: 1 (ref 0.8–1.2)
LYMPHOCYTES # BLD AUTO: 0.7 K/UL (ref 1–4.8)
LYMPHOCYTES # BLD AUTO: 0.8 K/UL (ref 1–4.8)
LYMPHOCYTES NFR BLD: 7 % (ref 18–48)
LYMPHOCYTES NFR BLD: 8.2 % (ref 18–48)
MCH RBC QN AUTO: 30.9 PG (ref 27–31)
MCH RBC QN AUTO: 31.2 PG (ref 27–31)
MCHC RBC AUTO-ENTMCNC: 32.9 G/DL (ref 32–36)
MCHC RBC AUTO-ENTMCNC: 34.3 G/DL (ref 32–36)
MCV RBC AUTO: 90 FL (ref 82–98)
MCV RBC AUTO: 95 FL (ref 82–98)
MONOCYTES # BLD AUTO: 0.9 K/UL (ref 0.3–1)
MONOCYTES # BLD AUTO: 0.9 K/UL (ref 0.3–1)
MONOCYTES NFR BLD: 8.7 % (ref 4–15)
MONOCYTES NFR BLD: 9.4 % (ref 4–15)
NEUTROPHILS # BLD AUTO: 7.4 K/UL (ref 1.8–7.7)
NEUTROPHILS # BLD AUTO: 7.8 K/UL (ref 1.8–7.7)
NEUTROPHILS NFR BLD: 76.5 % (ref 38–73)
NEUTROPHILS NFR BLD: 78.9 % (ref 38–73)
NRBC BLD-RTO: 0 /100 WBC
NRBC BLD-RTO: 0 /100 WBC
PLATELET # BLD AUTO: 115 K/UL (ref 150–450)
PLATELET # BLD AUTO: 127 K/UL (ref 150–450)
PMV BLD AUTO: 10.4 FL (ref 9.2–12.9)
PMV BLD AUTO: 10.7 FL (ref 9.2–12.9)
POTASSIUM SERPL-SCNC: 5.1 MMOL/L (ref 3.5–5.1)
PROT SERPL-MCNC: 6.6 G/DL (ref 6–8.4)
PROTHROMBIN TIME: 10.8 SEC (ref 9–12.5)
PROTHROMBIN TIME: 10.9 SEC (ref 9–12.5)
RBC # BLD AUTO: 4.34 M/UL (ref 4.6–6.2)
RBC # BLD AUTO: 4.55 M/UL (ref 4.6–6.2)
SODIUM SERPL-SCNC: 137 MMOL/L (ref 136–145)
WBC # BLD AUTO: 9.73 K/UL (ref 3.9–12.7)
WBC # BLD AUTO: 9.83 K/UL (ref 3.9–12.7)

## 2023-12-26 PROCEDURE — 63600175 PHARM REV CODE 636 W HCPCS: Performed by: STUDENT IN AN ORGANIZED HEALTH CARE EDUCATION/TRAINING PROGRAM

## 2023-12-26 PROCEDURE — 25000003 PHARM REV CODE 250: Performed by: STUDENT IN AN ORGANIZED HEALTH CARE EDUCATION/TRAINING PROGRAM

## 2023-12-26 PROCEDURE — 85730 THROMBOPLASTIN TIME PARTIAL: CPT | Mod: 91 | Performed by: SURGERY

## 2023-12-26 PROCEDURE — 94761 N-INVAS EAR/PLS OXIMETRY MLT: CPT

## 2023-12-26 PROCEDURE — 85730 THROMBOPLASTIN TIME PARTIAL: CPT

## 2023-12-26 PROCEDURE — 20600001 HC STEP DOWN PRIVATE ROOM

## 2023-12-26 PROCEDURE — 51701 INSERT BLADDER CATHETER: CPT

## 2023-12-26 PROCEDURE — 99900035 HC TECH TIME PER 15 MIN (STAT)

## 2023-12-26 PROCEDURE — 63600175 PHARM REV CODE 636 W HCPCS

## 2023-12-26 PROCEDURE — 36415 COLL VENOUS BLD VENIPUNCTURE: CPT

## 2023-12-26 PROCEDURE — 36415 COLL VENOUS BLD VENIPUNCTURE: CPT | Performed by: SURGERY

## 2023-12-26 PROCEDURE — 80053 COMPREHEN METABOLIC PANEL: CPT | Performed by: STUDENT IN AN ORGANIZED HEALTH CARE EDUCATION/TRAINING PROGRAM

## 2023-12-26 PROCEDURE — 27000221 HC OXYGEN, UP TO 24 HOURS

## 2023-12-26 PROCEDURE — 36415 COLL VENOUS BLD VENIPUNCTURE: CPT | Performed by: STUDENT IN AN ORGANIZED HEALTH CARE EDUCATION/TRAINING PROGRAM

## 2023-12-26 PROCEDURE — 85025 COMPLETE CBC W/AUTO DIFF WBC: CPT

## 2023-12-26 PROCEDURE — 85610 PROTHROMBIN TIME: CPT

## 2023-12-26 PROCEDURE — 25000003 PHARM REV CODE 250

## 2023-12-26 PROCEDURE — 11000001 HC ACUTE MED/SURG PRIVATE ROOM

## 2023-12-26 RX ORDER — HEPARIN SODIUM,PORCINE/D5W 25000/250
0-40 INTRAVENOUS SOLUTION INTRAVENOUS CONTINUOUS
Status: DISCONTINUED | OUTPATIENT
Start: 2023-12-26 | End: 2023-12-28

## 2023-12-26 RX ORDER — HEPARIN SODIUM,PORCINE/D5W 25000/250
0-40 INTRAVENOUS SOLUTION INTRAVENOUS CONTINUOUS
Status: DISCONTINUED | OUTPATIENT
Start: 2023-12-26 | End: 2023-12-26

## 2023-12-26 RX ADMIN — ACETAMINOPHEN 1000 MG: 325 TABLET ORAL at 03:12

## 2023-12-26 RX ADMIN — HYDROXYZINE HYDROCHLORIDE 25 MG: 25 TABLET ORAL at 01:12

## 2023-12-26 RX ADMIN — HEPARIN SODIUM AND DEXTROSE 18 UNITS/KG/HR: 10000; 5 INJECTION INTRAVENOUS at 10:12

## 2023-12-26 RX ADMIN — DIVALPROEX SODIUM 125 MG: 125 TABLET, DELAYED RELEASE ORAL at 08:12

## 2023-12-26 RX ADMIN — DIVALPROEX SODIUM 125 MG: 125 TABLET, DELAYED RELEASE ORAL at 09:12

## 2023-12-26 RX ADMIN — PANTOPRAZOLE SODIUM 40 MG: 40 TABLET, DELAYED RELEASE ORAL at 06:12

## 2023-12-26 RX ADMIN — HEPARIN SODIUM AND DEXTROSE 20 UNITS/KG/HR: 10000; 5 INJECTION INTRAVENOUS at 11:12

## 2023-12-26 RX ADMIN — ACETAMINOPHEN 1000 MG: 325 TABLET ORAL at 09:12

## 2023-12-26 RX ADMIN — HYDROXYZINE HYDROCHLORIDE 25 MG: 25 TABLET ORAL at 09:12

## 2023-12-26 RX ADMIN — ACETAMINOPHEN 1000 MG: 325 TABLET ORAL at 08:12

## 2023-12-26 RX ADMIN — CARVEDILOL 6.25 MG: 6.25 TABLET, FILM COATED ORAL at 09:12

## 2023-12-26 RX ADMIN — CEFAZOLIN 2 G: 2 INJECTION, POWDER, FOR SOLUTION INTRAMUSCULAR; INTRAVENOUS at 06:12

## 2023-12-26 RX ADMIN — ASPIRIN 81 MG CHEWABLE TABLET 81 MG: 81 TABLET CHEWABLE at 09:12

## 2023-12-26 RX ADMIN — ATORVASTATIN CALCIUM 80 MG: 40 TABLET, FILM COATED ORAL at 09:12

## 2023-12-26 RX ADMIN — HEPARIN SODIUM AND DEXTROSE 20 UNITS/KG/HR: 10000; 5 INJECTION INTRAVENOUS at 06:12

## 2023-12-26 RX ADMIN — DIVALPROEX SODIUM 125 MG: 125 TABLET, DELAYED RELEASE ORAL at 03:12

## 2023-12-26 RX ADMIN — CEFAZOLIN 2 G: 2 INJECTION, POWDER, FOR SOLUTION INTRAMUSCULAR; INTRAVENOUS at 11:12

## 2023-12-26 RX ADMIN — CARVEDILOL 6.25 MG: 6.25 TABLET, FILM COATED ORAL at 06:12

## 2023-12-26 RX ADMIN — QUETIAPINE FUMARATE 25 MG: 25 TABLET ORAL at 08:12

## 2023-12-26 RX ADMIN — Medication: at 09:12

## 2023-12-26 RX ADMIN — HYDRALAZINE HYDROCHLORIDE 10 MG: 20 INJECTION, SOLUTION INTRAMUSCULAR; INTRAVENOUS at 09:12

## 2023-12-26 RX ADMIN — CEFAZOLIN 2 G: 2 INJECTION, POWDER, FOR SOLUTION INTRAMUSCULAR; INTRAVENOUS at 03:12

## 2023-12-26 NOTE — PLAN OF CARE
[facility] Plan of Care Note    Dx Thrombosis of femoro-popliteal bypass graft     Shift Events Heparin drip on hold per MD orders     Goals of Care: pain management, Heparin drip     Neuro: AAO X 4     Vital Signs: VSS     Respiratory: 2L     Diet: reg      Is patient tolerating current diet? yes     GTTS: Heparin @ 20.1/ 26 units/kg/hr---Heparin on hold per Md order for surgery this morning     Urine Output/Bowel Movement: adequate urine output with marie catheter     Drains/Tubes/Tube Feeds (include total output/shift): none     Lines: R upper arm 20g / R FA 20/ L hand 20g      Accuchecks: none     Skin: incision to R groin      Fall Risk Score: 18     Activity level? Up with assist X 1     Any scheduled procedures? Possible surgery     Any safety concerns? Fall precautions/ bleeding precautions     Other: Patient NPO @ MN 12/26/23  Problem: Adult Inpatient Plan of Care  Goal: Plan of Care Review  Outcome: Ongoing, Progressing  Goal: Patient-Specific Goal (Individualized)  Outcome: Ongoing, Progressing  Goal: Absence of Hospital-Acquired Illness or Injury  Outcome: Ongoing, Progressing  Goal: Optimal Comfort and Wellbeing  Outcome: Ongoing, Progressing  Goal: Readiness for Transition of Care  Outcome: Ongoing, Progressing     Problem: Impaired Wound Healing  Goal: Optimal Wound Healing  Outcome: Ongoing, Progressing     Problem: Infection  Goal: Absence of Infection Signs and Symptoms  Outcome: Ongoing, Progressing     Problem: Skin Injury Risk Increased  Goal: Skin Health and Integrity  Outcome: Ongoing, Progressing     Problem: Fall Injury Risk  Goal: Absence of Fall and Fall-Related Injury  Outcome: Ongoing, Progressing

## 2023-12-26 NOTE — SUBJECTIVE & OBJECTIVE
Medications:  Continuous Infusions:   hydromorphone in 0.9 % NaCl 6 mg/30 ml       Scheduled Meds:   acetaminophen  1,000 mg Oral TID    aspirin  81 mg Oral Daily    atorvastatin  80 mg Oral Daily    carvediloL  6.25 mg Oral BID WM    ceFAZolin (ANCEF) IVPB  2 g Intravenous Q8H    divalproex  125 mg Oral TID    pantoprazole  40 mg Oral Daily    QUEtiapine  25 mg Oral QHS     PRN Meds:hydrALAZINE, HYDROmorphone, hydrOXYzine, labetalol, melatonin, naloxone, ondansetron, promethazine, sodium chloride 0.9%     Objective:     Vital Signs (Most Recent):  Temp: 98.2 °F (36.8 °C) (12/26/23 0441)  Pulse: 81 (12/26/23 0441)  Resp: (!) 22 (12/26/23 0441)  BP: 105/71 (12/26/23 0441)  SpO2: (!) 92 % (12/26/23 0441) Vital Signs (24h Range):  Temp:  [97.6 °F (36.4 °C)-98.6 °F (37 °C)] 98.2 °F (36.8 °C)  Pulse:  [63-86] 81  Resp:  [18-22] 22  SpO2:  [92 %-95 %] 92 %  BP: (105-223)/(63-97) 105/71          Physical Exam  Constitutional:       Appearance: Normal appearance.   HENT:      Head: Normocephalic and atraumatic.      Nose: Nose normal.      Mouth/Throat:      Mouth: Mucous membranes are moist.      Pharynx: Oropharynx is clear.   Eyes:      Extraocular Movements: Extraocular movements intact.      Conjunctiva/sclera: Conjunctivae normal.   Cardiovascular:      Rate and Rhythm: Normal rate and regular rhythm.      Comments: The left foot is cold to the touch.      Pulmonary:      Effort: Pulmonary effort is normal.      Breath sounds: Normal breath sounds.   Abdominal:      General: Abdomen is flat.      Palpations: Abdomen is soft.   Musculoskeletal:      Comments: Tender to palpation left foot and calf  Strength diminished in left knee flexion, extension and ankle flexion and extension, chronic  Sensation dimninished in left compared to right in left lower extremity (chronic)   Skin:     General: Skin is warm and dry.      Capillary Refill: Capillary refill takes less than 2 seconds.      Comments: Large well healed scar  from prior medial left leg fasciotomy   L lower leg medial incision c/d/I    Neurological:      General: No focal deficit present.      Mental Status: He is alert.          Significant Labs:  CBC:   Recent Labs   Lab 12/25/23 0315   WBC 11.53   RBC 4.21*   HGB 13.2*   HCT 39.7*   PLT 83*   MCV 94   MCH 31.4*   MCHC 33.2     CMP:   Recent Labs   Lab 12/25/23 0315   *   CALCIUM 8.3*   ALBUMIN 2.7*   PROT 5.7*   *   K 4.6   CO2 24      BUN 27*   CREATININE 0.8   ALKPHOS 81   ALT 23   AST 34   BILITOT 0.4       Significant Diagnostics:  I have reviewed all pertinent imaging results/findings within the past 24 hours.   SCD RLE INTRA-OP

## 2023-12-26 NOTE — NURSING
"Patient states "I have pain in my foot, its agony." Patient is also complaining about itching. Contact Jason Ricardo on call in reference to increase of patient pain. MD put in prn medications for pain and itching. WCTM    Patient left arm swollen, surgery MD Moody on call notified and came to bedside. Ultrasound order. WC    Contact MD moody in reference to patient /97, PRN medications were given. Md Moody came to bedside to assess patient. WC    "

## 2023-12-26 NOTE — RESPIRATORY THERAPY
"RAPID RESPONSE RESPIRATORY THERAPY ETCO2 CHECK         Time of visit: 926     Code Status: Full Code   : 1960  Bed: 1006/1006 A:   MRN: [id number]  Time spent at the bedside: < 15 min    SITUATION    Evaluated patient for: ETCo2 compliance    BACKGROUND    Why is the patient in the hospital?: Thrombosis of femoro-popliteal bypass graft    Patient has a past medical history of Abnormal PFT, Cardiac murmur, Cardiomegaly, CKD (chronic kidney disease), Coronary artery disease, Depression, DVT (deep venous thrombosis), Dyspnea, Edema, Episode of transient neurologic symptoms, Essential hypertension, GI bleed, Heart attack, High cholesterol, Hypertension, Mixed hyperlipidemia, PVD (peripheral vascular disease), Stroke, and Thrombotic stroke involving right middle cerebral artery.    24 Hours Vitals Range:  Temp:  [97.6 °F (36.4 °C)-100.1 °F (37.8 °C)]   Pulse:  [63-87]   Resp:  [16-22]   BP: (105-223)/(70-97)   SpO2:  [92 %-95 %]     Labs:    Recent Labs     23  0226 23  0315 23  0906    135* 137   K 4.9 4.6 5.1    104 102   CO2 22* 24 27   BUN 19 27* 17   CREATININE 0.8 0.8 0.8   * 136* 100        No results for input(s): "PH", "PCO2", "PO2", "HCO3", "POCSATURATED", "BE" in the last 72 hours.    ASSESSMENT/INTERVENTIONS      Last VS   Temp: 99.8 °F (37.7 °C) ( 0957)  Pulse: 87 ( 1106)  Resp: 16 ( 0805)  BP: 203/88 ( 0805)  SpO2: 94 % ( 08)    Level of Consciousness: Level of Consciousness (AVPU): alert  Respiratory Effort: Respiratory Effort: Normal, Unlabored Expansion/Accessory Muscle Usage: Expansion/Accessory Muscles/Retractions: expansion symmetric, no retractions, no use of accessory muscles  All Lung Field Breath Sounds: All Lung Fields Breath Sounds: Anterior:, Lateral:, diminished  Is the ETCO2 monitor on? Yes  Is the patient wearing a cannula? Yes  Are ETCO2 orders placed? Yes  Is the patient on a PCA pump? Yes  ETCO2 monitored: ETCO2 (mmHg): " 32 mmHg  Ambu at bedside:      Active Orders   Respiratory Care    END TIDAL CO2 MONITOR Q12H     Frequency: Q12H     Number of Occurrences: Until Specified    Incentive spirometry     Frequency: Daily     Number of Occurrences: Until Specified    Oxygen Continuous     Frequency: Continuous     Number of Occurrences: Until Specified     Order Questions:      Device type: Low flow      Device: Nasal Cannula (1- 5 Liters)      LPM: 2      Titrate O2 per Oxygen Titration Protocol: Yes      To maintain SpO2 goal of: >= 90%      Notify MD of: Inability to achieve desired SpO2; Sudden change in patient status and requires 20% increase in FiO2; Patient requires >60% FiO2    Pulse Oximetry Q4H     Frequency: Q4H     Number of Occurrences: Until Specified       RECOMMENDATIONS    We recommend: RRT Recs: Continue POC per primary team.      FOLLOW-UP    Please call back the Rapid Response RT, Talia Gray RRT at x [phone] for any questions or concerns.

## 2023-12-26 NOTE — ASSESSMENT & PLAN NOTE
63 M with long history of PAD, s/p SFA to above the knee popliteal artery bypass, current smoker, presents as a transfer due to concern for decreased sensation with concern for rest pain secondary to occluded bypass graft now s/p angiogram, thrombolysis, and posterior tibial artery exploration.    Unfortunately, no good options for revascularization of this patients left leg. Will plan on left above knee amputation, timing to be determined. After long discussion with patient this morning he is amenable to that surgery.     -regular diet   -continue aspirin, statin  -Will restart patient on high intensity hep gtt today if surgery is not happening today  -Pain control with multimodal and PCA  -keep dressing clean and dry.  -Surgical timing to be determined, possibly today  -Patient NPO, discussed NPO status with him this morning

## 2023-12-26 NOTE — PROGRESS NOTES
[facility]  Vascular Surgery  Progress Note    Patient Name: Jamari Huerta III  MRN: [id number]  Admission Date: 12/21/2023  Primary Care Provider: Nataliya Anderson MD    Subjective:     Interval History: Surgery timing for L AKA will be Thursday 12/28, case request is in.  Hep gtt restarted at high intensity.  Continue PCA pump for pain mgmt.      Post-Op Info:  Procedure(s) (LRB):  Angiogram Extremity Unilateral (Left)  EXPLORATION, Posterior tibial artery (Left)   3 Days Post-Op     Medications:  Continuous Infusions:   hydromorphone in 0.9 % NaCl 6 mg/30 ml       Scheduled Meds:   acetaminophen  1,000 mg Oral TID    aspirin  81 mg Oral Daily    atorvastatin  80 mg Oral Daily    carvediloL  6.25 mg Oral BID WM    ceFAZolin (ANCEF) IVPB  2 g Intravenous Q8H    divalproex  125 mg Oral TID    pantoprazole  40 mg Oral Daily    QUEtiapine  25 mg Oral QHS     PRN Meds:hydrALAZINE, HYDROmorphone, hydrOXYzine, labetalol, melatonin, naloxone, ondansetron, promethazine, sodium chloride 0.9%     Objective:     Vital Signs (Most Recent):  Temp: 98.2 °F (36.8 °C) (12/26/23 0441)  Pulse: 81 (12/26/23 0441)  Resp: (!) 22 (12/26/23 0441)  BP: 105/71 (12/26/23 0441)  SpO2: (!) 92 % (12/26/23 0441) Vital Signs (24h Range):  Temp:  [97.6 °F (36.4 °C)-98.6 °F (37 °C)] 98.2 °F (36.8 °C)  Pulse:  [63-86] 81  Resp:  [18-22] 22  SpO2:  [92 %-95 %] 92 %  BP: (105-223)/(63-97) 105/71          Physical Exam  Constitutional:       Appearance: Normal appearance.   HENT:      Head: Normocephalic and atraumatic.      Nose: Nose normal.      Mouth/Throat:      Mouth: Mucous membranes are moist.      Pharynx: Oropharynx is clear.   Eyes:      Extraocular Movements: Extraocular movements intact.      Conjunctiva/sclera: Conjunctivae normal.   Cardiovascular:      Rate and Rhythm: Normal rate and regular rhythm.      Comments: The left foot is cold to the touch.      Pulmonary:      Effort: Pulmonary effort is normal.      Breath sounds:  Normal breath sounds.   Abdominal:      General: Abdomen is flat.      Palpations: Abdomen is soft.   Musculoskeletal:      Comments: Tender to palpation left foot and calf  Strength diminished in left knee flexion, extension and ankle flexion and extension, chronic  Sensation dimninished in left compared to right in left lower extremity (chronic)   Skin:     General: Skin is warm and dry.      Capillary Refill: Capillary refill takes less than 2 seconds.      Comments: Large well healed scar from prior medial left leg fasciotomy   L lower leg medial incision c/d/I    Neurological:      General: No focal deficit present.      Mental Status: He is alert.          Significant Labs:  CBC:   Recent Labs   Lab 12/25/23 0315   WBC 11.53   RBC 4.21*   HGB 13.2*   HCT 39.7*   PLT 83*   MCV 94   MCH 31.4*   MCHC 33.2     CMP:   Recent Labs   Lab 12/25/23 0315   *   CALCIUM 8.3*   ALBUMIN 2.7*   PROT 5.7*   *   K 4.6   CO2 24      BUN 27*   CREATININE 0.8   ALKPHOS 81   ALT 23   AST 34   BILITOT 0.4       Significant Diagnostics:  I have reviewed all pertinent imaging results/findings within the past 24 hours.  Assessment/Plan:     * Thrombosis of femoro-popliteal bypass graft  63 M with long history of PAD, s/p SFA to above the knee popliteal artery bypass, current smoker, presents as a transfer due to concern for decreased sensation with concern for rest pain secondary to occluded bypass graft now s/p angiogram, thrombolysis, and posterior tibial artery exploration.    Unfortunately, no good options for revascularization of this patients left leg. Will plan on left above knee amputation, timing to be determined. After long discussion with patient this morning he is amenable to that surgery.     -regular diet   -continue aspirin, statin  -Will restart patient on high intensity hep gtt today   -12/28 for L AKA  -Pain control with multimodal and PCA  -keep dressing clean and dry.  -Patient NPO midnight  12/28        Sarah Condon NP  Vascular Surgery  [facility]

## 2023-12-27 ENCOUNTER — ANESTHESIA EVENT (OUTPATIENT)
Dept: SURGERY | Facility: HOSPITAL | Age: 63
DRG: 240 | End: 2023-12-27
Payer: MEDICARE

## 2023-12-27 LAB
ALBUMIN SERPL BCP-MCNC: 2.6 G/DL (ref 3.5–5.2)
ALP SERPL-CCNC: 87 U/L (ref 55–135)
ALT SERPL W/O P-5'-P-CCNC: 20 U/L (ref 10–44)
ANION GAP SERPL CALC-SCNC: 10 MMOL/L (ref 8–16)
APTT PPP: 34 SEC (ref 21–32)
APTT PPP: 37 SEC (ref 21–32)
APTT PPP: 39.2 SEC (ref 21–32)
APTT PPP: 43.9 SEC (ref 21–32)
APTT PPP: 66.5 SEC (ref 21–32)
AST SERPL-CCNC: 34 U/L (ref 10–40)
BASOPHILS # BLD AUTO: 0.06 K/UL (ref 0–0.2)
BASOPHILS NFR BLD: 0.6 % (ref 0–1.9)
BILIRUB SERPL-MCNC: 0.7 MG/DL (ref 0.1–1)
BUN SERPL-MCNC: 15 MG/DL (ref 8–23)
CALCIUM SERPL-MCNC: 8.7 MG/DL (ref 8.7–10.5)
CHLORIDE SERPL-SCNC: 105 MMOL/L (ref 95–110)
CO2 SERPL-SCNC: 24 MMOL/L (ref 23–29)
CREAT SERPL-MCNC: 0.7 MG/DL (ref 0.5–1.4)
DIFFERENTIAL METHOD BLD: ABNORMAL
EOSINOPHIL # BLD AUTO: 0.4 K/UL (ref 0–0.5)
EOSINOPHIL NFR BLD: 4.6 % (ref 0–8)
ERYTHROCYTE [DISTWIDTH] IN BLOOD BY AUTOMATED COUNT: 15.7 % (ref 11.5–14.5)
EST. GFR  (NO RACE VARIABLE): >60 ML/MIN/1.73 M^2
GLUCOSE SERPL-MCNC: 95 MG/DL (ref 70–110)
HCT VFR BLD AUTO: 41.7 % (ref 40–54)
HGB BLD-MCNC: 13.4 G/DL (ref 14–18)
IMM GRANULOCYTES # BLD AUTO: 0.1 K/UL (ref 0–0.04)
IMM GRANULOCYTES NFR BLD AUTO: 1.1 % (ref 0–0.5)
LYMPHOCYTES # BLD AUTO: 0.8 K/UL (ref 1–4.8)
LYMPHOCYTES NFR BLD: 8.1 % (ref 18–48)
MCH RBC QN AUTO: 30 PG (ref 27–31)
MCHC RBC AUTO-ENTMCNC: 32.1 G/DL (ref 32–36)
MCV RBC AUTO: 94 FL (ref 82–98)
MONOCYTES # BLD AUTO: 0.8 K/UL (ref 0.3–1)
MONOCYTES NFR BLD: 8.2 % (ref 4–15)
NEUTROPHILS # BLD AUTO: 7.2 K/UL (ref 1.8–7.7)
NEUTROPHILS NFR BLD: 77.4 % (ref 38–73)
NRBC BLD-RTO: 0 /100 WBC
PF4 HEPARIN CMPLX AB SER QL: 0.11 OD (ref 0–0.4)
PLATELET # BLD AUTO: 112 K/UL (ref 150–450)
PMV BLD AUTO: 10.8 FL (ref 9.2–12.9)
POTASSIUM SERPL-SCNC: 3.8 MMOL/L (ref 3.5–5.1)
PROT SERPL-MCNC: 5.9 G/DL (ref 6–8.4)
RBC # BLD AUTO: 4.46 M/UL (ref 4.6–6.2)
SODIUM SERPL-SCNC: 139 MMOL/L (ref 136–145)
WBC # BLD AUTO: 9.29 K/UL (ref 3.9–12.7)

## 2023-12-27 PROCEDURE — 20600001 HC STEP DOWN PRIVATE ROOM

## 2023-12-27 PROCEDURE — 85730 THROMBOPLASTIN TIME PARTIAL: CPT | Mod: 91

## 2023-12-27 PROCEDURE — 63600175 PHARM REV CODE 636 W HCPCS: Performed by: STUDENT IN AN ORGANIZED HEALTH CARE EDUCATION/TRAINING PROGRAM

## 2023-12-27 PROCEDURE — 25000003 PHARM REV CODE 250: Performed by: STUDENT IN AN ORGANIZED HEALTH CARE EDUCATION/TRAINING PROGRAM

## 2023-12-27 PROCEDURE — 63600175 PHARM REV CODE 636 W HCPCS

## 2023-12-27 PROCEDURE — 85730 THROMBOPLASTIN TIME PARTIAL: CPT | Mod: 91 | Performed by: SURGERY

## 2023-12-27 PROCEDURE — 11000001 HC ACUTE MED/SURG PRIVATE ROOM

## 2023-12-27 PROCEDURE — 25000003 PHARM REV CODE 250

## 2023-12-27 PROCEDURE — 80053 COMPREHEN METABOLIC PANEL: CPT | Performed by: STUDENT IN AN ORGANIZED HEALTH CARE EDUCATION/TRAINING PROGRAM

## 2023-12-27 PROCEDURE — 99900035 HC TECH TIME PER 15 MIN (STAT)

## 2023-12-27 PROCEDURE — 36415 COLL VENOUS BLD VENIPUNCTURE: CPT | Performed by: SURGERY

## 2023-12-27 PROCEDURE — 85025 COMPLETE CBC W/AUTO DIFF WBC: CPT | Performed by: SURGERY

## 2023-12-27 PROCEDURE — 94761 N-INVAS EAR/PLS OXIMETRY MLT: CPT

## 2023-12-27 RX ADMIN — CARVEDILOL 6.25 MG: 6.25 TABLET, FILM COATED ORAL at 08:12

## 2023-12-27 RX ADMIN — ATORVASTATIN CALCIUM 80 MG: 40 TABLET, FILM COATED ORAL at 08:12

## 2023-12-27 RX ADMIN — ACETAMINOPHEN 1000 MG: 325 TABLET ORAL at 08:12

## 2023-12-27 RX ADMIN — DIVALPROEX SODIUM 125 MG: 125 TABLET, DELAYED RELEASE ORAL at 08:12

## 2023-12-27 RX ADMIN — HYDRALAZINE HYDROCHLORIDE 10 MG: 20 INJECTION, SOLUTION INTRAMUSCULAR; INTRAVENOUS at 08:12

## 2023-12-27 RX ADMIN — CEFAZOLIN 2 G: 2 INJECTION, POWDER, FOR SOLUTION INTRAMUSCULAR; INTRAVENOUS at 06:12

## 2023-12-27 RX ADMIN — Medication 6 MG: at 08:12

## 2023-12-27 RX ADMIN — ASPIRIN 81 MG CHEWABLE TABLET 81 MG: 81 TABLET CHEWABLE at 08:12

## 2023-12-27 RX ADMIN — CARVEDILOL 6.25 MG: 6.25 TABLET, FILM COATED ORAL at 05:12

## 2023-12-27 RX ADMIN — HYDRALAZINE HYDROCHLORIDE 10 MG: 20 INJECTION, SOLUTION INTRAMUSCULAR; INTRAVENOUS at 09:12

## 2023-12-27 RX ADMIN — Medication: at 07:12

## 2023-12-27 RX ADMIN — DIVALPROEX SODIUM 125 MG: 125 TABLET, DELAYED RELEASE ORAL at 03:12

## 2023-12-27 RX ADMIN — ACETAMINOPHEN 1000 MG: 325 TABLET ORAL at 03:12

## 2023-12-27 RX ADMIN — PANTOPRAZOLE SODIUM 40 MG: 40 TABLET, DELAYED RELEASE ORAL at 05:12

## 2023-12-27 RX ADMIN — CEFAZOLIN 2 G: 2 INJECTION, POWDER, FOR SOLUTION INTRAMUSCULAR; INTRAVENOUS at 03:12

## 2023-12-27 RX ADMIN — ACETAMINOPHEN 1000 MG: 325 TABLET ORAL at 09:12

## 2023-12-27 RX ADMIN — HEPARIN SODIUM AND DEXTROSE 24 UNITS/KG/HR: 10000; 5 INJECTION INTRAVENOUS at 01:12

## 2023-12-27 RX ADMIN — QUETIAPINE FUMARATE 25 MG: 25 TABLET ORAL at 08:12

## 2023-12-27 RX ADMIN — CEFAZOLIN 2 G: 2 INJECTION, POWDER, FOR SOLUTION INTRAMUSCULAR; INTRAVENOUS at 11:12

## 2023-12-27 NOTE — PLAN OF CARE
[facility]  Discharge Reassessment    Primary Care Provider: Nataliya Anderson MD    Expected Discharge Date:     Reassessment (most recent)       Discharge Reassessment - 12/27/23 0958          Discharge Reassessment    Assessment Type Discharge Planning Reassessment     Did the patient's condition or plan change since previous assessment? No     Discharge Plan discussed with: Patient     Discharge Plan A Skilled Nursing Facility     Discharge Plan B Home with family     Transition of Care Barriers None     Why the patient remains in the hospital Requires continued medical care                     Plan for SNF.

## 2023-12-27 NOTE — PLAN OF CARE
[facility] Plan of Care Note     Dx Thrombosis of femoro-popliteal bypass graft     Shift Events: Bladder scan performed at midnight X 2 197 ml and 206 ml ml--bladder scan performed @6 am X 2  43 ml and 83 ml     Goals of Care: pain management with PCA,  Heparin drip monitored, monitored urine output     Neuro: AAO X 4     Vital Signs: VSS     Respiratory: 2L     Diet: Cardiac      Is patient tolerating current diet? yes     GTTS: Heparin @ Rate 17/ 22 units/kg/hr     Urine Output/Bowel Movement: adequate urine output with marie catheter     Drains/Tubes/Tube Feeds (include total output/shift): none     Lines: R upper arm 20g / R FA 20/ L hand 20g      Accuchecks: none     Skin: incision to R groin      Fall Risk Score: 18     Activity level? Up with assist X 2     Any scheduled procedures? Possible surgery Thursday December 28, 2023 Left AKA     Any safety concerns? Fall precautions/ bleeding precautions     Other: Patient NPO @ MN 12/28/23    Problem: Adult Inpatient Plan of Care  Goal: Plan of Care Review  Outcome: Ongoing, Progressing  Goal: Patient-Specific Goal (Individualized)  Outcome: Ongoing, Progressing  Goal: Absence of Hospital-Acquired Illness or Injury  Outcome: Ongoing, Progressing  Goal: Optimal Comfort and Wellbeing  Outcome: Ongoing, Progressing  Goal: Readiness for Transition of Care  Outcome: Ongoing, Progressing     Problem: Impaired Wound Healing  Goal: Optimal Wound Healing  Outcome: Ongoing, Progressing     Problem: Infection  Goal: Absence of Infection Signs and Symptoms  Outcome: Ongoing, Progressing     Problem: Skin Injury Risk Increased  Goal: Skin Health and Integrity  Outcome: Ongoing, Progressing     Problem: Fall Injury Risk  Goal: Absence of Fall and Fall-Related Injury  Outcome: Ongoing, Progressing

## 2023-12-27 NOTE — ANESTHESIA PREPROCEDURE EVALUATION
[facility]  Anesthesia Pre-Operative Evaluation         Patient Name: Jamari Huerta III  YOB: 1960  MRN: [id number]    SUBJECTIVE:     Pre-operative evaluation for Procedure(s) (LRB):  AMPUTATION, ABOVE KNEE (Left)     12/27/2023    Jamari Huerta III is a 63 y.o. male w/ a significant PMHx of CAD w MI 6/1/23, HFrEF, COPD, chronic bronchitis, neurofibromatosis, hx CVA 6 mo prior with residual left-sided deficits, CKD, LLE DVT in 2002, PAD, s/p SFA to above the knee popliteal artery bypass, current smoker, presents as a transfer due to concern for decreased sensation with concern for rest pain secondary to occluded bypass graft now s/p angiogram, thrombolysis, and posterior tibial artery exploration.     Patient now presents for the above procedure(s).    Echo Summary  Results for orders placed during the hospital encounter of 05/14/19    Transthoracic echo (TTE) 2D with Color Flow    Interpretation Summary  · Mild left atrial enlargement.  · Low normal left ventricular systolic function. The estimated ejection fraction is 50%  · Eccentric left ventricular hypertrophy.  · Moderate left ventricular enlargement.  · Indeterminate left ventricular diastolic function.  · Mild right atrial enlargement.  · Normal right ventricular systolic function.  · Mild mitral regurgitation.       Prev airway: None documented.    LDA:        Peripheral IV - Single Lumen 12/22/23 0534 20 G Left;Posterior Hand (Active)   Site Assessment Clean;Dry;Intact;No redness;No swelling 12/26/23 2056   Extremity Assessment Distal to IV No warmth;No swelling;No redness;No abnormal discoloration 12/26/23 2056   Line Status Flushed;Saline locked 12/26/23 2056   Dressing Status Clean;Intact;Dry 12/26/23 2056   Dressing Intervention Integrity maintained 12/26/23 2056   Dressing Change Due 12/26/23 12/23/23 2045   Site Change Due 12/26/23 12/23/23 1501   Reason Not Rotated Not due 12/25/23 0813   Number of days: 5             Peripheral IV - Single Lumen 12/26/23 1430 20 G;1 3/4 in Anterior;Right Forearm (Active)   Site Assessment Clean;Dry;Intact;No redness;No swelling 12/26/23 2056   Extremity Assessment Distal to IV No warmth;No swelling;No redness;No abnormal discoloration 12/26/23 2056   Line Status Flushed;Saline locked 12/26/23 2056   Dressing Status Clean;Intact;Dry 12/26/23 2056   Dressing Intervention Integrity maintained 12/26/23 2056   Site Change Due 12/30/23 12/26/23 1430   Number of days: 0       Drips:    heparin (porcine) in D5W 24 Units/kg/hr (12/27/23 0736)    hydromorphone in 0.9 % NaCl 6 mg/30 ml         Patient Active Problem List   Diagnosis    Mixed hyperlipidemia    Essential hypertension    Dyspnea    Abnormal CT of the chest    Arteriosclerosis of coronary artery    Compulsive tobacco user syndrome    History of stroke    Dysarthria and anarthria    Neurofibromatosis    Moderate single current episode of major depressive disorder    Chronic bronchitis, unspecified chronic bronchitis type    Stage 3 chronic kidney disease, unspecified whether stage 3a or 3b CKD    Cerebrovascular accident (CVA) due to occlusion of right middle cerebral artery    Chronic obstructive pulmonary disease    HFrEF (heart failure with reduced ejection fraction)    History of DVT (deep vein thrombosis)    Iron deficiency anemia    Hemiparesis affecting left side as late effect of cerebrovascular accident    Obesity (BMI 30-39.9)    PVD (peripheral vascular disease)    Tobacco abuse    Mood disorder    Prediabetes    Thrombosis of femoro-popliteal bypass graft       Review of patient's allergies indicates:   Allergen Reactions    Bupropion Swelling    Zyban [bupropion hcl (smoking deter)] Swelling    Morphine Itching and Rash       Current Inpatient Medications:   acetaminophen  1,000 mg Oral TID    aspirin  81 mg Oral Daily    atorvastatin  80 mg Oral Daily    carvediloL  6.25 mg Oral BID WM    ceFAZolin (ANCEF) IVPB  2 g Intravenous  Q8H    divalproex  125 mg Oral TID    pantoprazole  40 mg Oral Daily    QUEtiapine  25 mg Oral QHS       No current facility-administered medications on file prior to encounter.     Current Outpatient Medications on File Prior to Encounter   Medication Sig Dispense Refill    aspirin 81 MG Chew Take 81 mg by mouth once daily.      atorvastatin (LIPITOR) 80 MG tablet Take 1 tablet (80 mg total) by mouth once daily. 90 tablet 3    carvediloL (COREG) 6.25 MG tablet Take 6.25 mg by mouth 2 (two) times daily with meals.      divalproex (DEPAKOTE) 125 MG EC tablet Take 125 mg by mouth 3 (three) times daily.      pantoprazole (PROTONIX) 40 MG tablet Take 40 mg by mouth Daily. Indications: gastroesophageal reflux disease      QUEtiapine (SEROQUEL) 25 MG Tab 1 tab at bedtime 90 tablet 3    sacubitriL-valsartan (ENTRESTO) 24-26 mg per tablet Take 1 tablet by mouth once daily. 90 tablet 3       Past Surgical History:   Procedure Laterality Date    ANGIOGRAPHY OF LOWER EXTREMITY Left 12/23/2023    Procedure: Angiogram Extremity Unilateral;  Surgeon: Wes Cobian MD;  Location: [facility] OR [address] [place] [address];  Service: Vascular;  Laterality: Left;  125.58 mGy  22.7890 Gycm2  2.0 min  95 ml dye    AORTIC VALVE SURGERY      1982 -    EXPLORATION OF FEMORAL ARTERY Left 12/23/2023    Procedure: EXPLORATION, Posterior tibial artery;  Surgeon: Wes Cobian MD;  Location: [facility] OR [address] [place] [address];  Service: Vascular;  Laterality: Left;    PTA, ILIAC ARTERY  12/22/2023    Procedure: PTA, Iliac Artery;  Surgeon: NATALIE Mitchell II, MD;  Location: [facility] CATH LAB;  Service: Vascular;;    THROMBOLYSIS, PERIPHERAL BLOOD VESSEL Left 12/22/2023    Procedure: Thrombolysis-peripheral;  Surgeon: NATALIE Mitchell II, MD;  Location: [facility] CATH LAB;  Service: Vascular;  Laterality: Left;    VASCULAR SURGERY      left lower leg       Social History:  Tobacco Use: High Risk (12/26/2023)    Patient History     Smoking Tobacco Use: Every Day     Smokeless Tobacco  Use: Never     Passive Exposure: Not on file      Alcohol Use: Not At Risk (12/21/2023)    AUDIT-C     Frequency of Alcohol Consumption: Never     Average Number of Drinks: Patient does not drink     Frequency of Binge Drinking: Never        OBJECTIVE:     Vital Signs Range (Last 24H):  Temp:  [36.4 °C (97.6 °F)-37.7 °C (99.8 °F)]   Pulse:  [68-87]   Resp:  [14-19]   BP: (101-195)/(58-88)   SpO2:  [93 %-97 %]       Significant Labs:  Lab Results   Component Value Date    WBC 9.83 12/26/2023    HGB 13.4 (L) 12/26/2023    HCT 39.1 (L) 12/26/2023     (L) 12/26/2023    CHOL 114 07/10/2023    TRIG 64 07/10/2023    HDL 31 (L) 07/10/2023    ALT 20 12/27/2023    AST 34 12/27/2023     12/27/2023    K 3.8 12/27/2023     12/27/2023    CREATININE 0.7 12/27/2023    BUN 15 12/27/2023    CO2 24 12/27/2023    TSH 0.976 07/09/2023    INR 1.0 12/26/2023    HGBA1C 5.8 07/09/2023       Diagnostic Studies: No relevant studies.    EKG:   Results for orders placed or performed during the hospital encounter of 12/21/23   EKG 12-lead    Collection Time: 12/21/23 11:09 AM    Narrative    Test Reason : I99.9,    Vent. Rate : 063 BPM     Atrial Rate : 063 BPM     P-R Int : 184 ms          QRS Dur : 094 ms      QT Int : 420 ms       P-R-T Axes : 057 014 069 degrees     QTc Int : 429 ms    Normal sinus rhythm  Septal infarct ,age undetermined  Abnormal ECG  Confirmed by Frtiz Chisholm ([id number]) on 12/26/2023 9:55:07 AM    Referred By: MATT   SELF           Confirmed By:Fritz Chisholm       2D ECHO:  TTE:  Results for orders placed or performed during the hospital encounter of 05/14/19   Transthoracic echo (TTE) 2D with Color Flow   Result Value Ref Range    BSA 2.36 m2    TDI SEPTAL 0.06     LV LATERAL E/E' RATIO 8.75     LV SEPTAL E/E' RATIO 11.67     LA WIDTH 4.11 cm    TDI LATERAL 0.08     LVIDd 6.32 (A) 3.5 - 6.0 cm    IVS 1.20 (A) 0.6 - 1.1 cm    Posterior Wall 1.20 (A) 0.6 - 1.1 cm    LVIDs 4.00 2.1 - 4.0 cm    FS 37 28  - 44 %    LA volume 81.44 cm3    Sinus 3.24 cm    STJ 3.21 cm    Ascending aorta 3.59 cm    LV mass 342.23 g    LA size 4.46 cm    RVDD 3.60 cm    TAPSE 1.91 cm    Left Ventricle Relative Wall Thickness 0.38 cm    AV mean gradient 3.13 mmHg    AV valve area 3.28 cm2    AV Velocity Ratio 0.69     AV index (prosthetic) 0.72     E/A ratio 1.00     Mean e' 0.07     E wave deceleration time 330.72 msec    LVOT diameter 2.41 cm    LVOT area 4.56 cm2    LVOT peak douglas [id number] m/s    LVOT peak VTI 19.05 cm    Ao peak douglas 1.10 m/s    Ao VTI 26.51 cm    LVOT stroke volume 86.86 cm3    AV peak gradient 4.84 mmHg    E/E' ratio 10.00     MV Peak E Douglas 0.70 m/s    MV Peak A Douglas 0.70 m/s    LV Systolic Volume 63.69 mL    LV Systolic Volume Index 27.7 mL/m2    LV Diastolic Volume 202.86 mL    LV Diastolic Volume Index 88.24 mL/m2    LA Volume Index 35.4 mL/m2    LV Mass Index 148.9 g/m2    RA Major Axis 5.53 cm    Left Atrium Minor Axis 4.73 cm    Left Atrium Major Axis 5.84 cm    RA Width 3.21 cm    EF + QEF 51 %    Narrative    · Mild left atrial enlargement.  · Low normal left ventricular systolic function. The estimated ejection   fraction is 50%  · Eccentric left ventricular hypertrophy.  · Moderate left ventricular enlargement.  · Indeterminate left ventricular diastolic function.  · Mild right atrial enlargement.  · Normal right ventricular systolic function.  · Mild mitral regurgitation.          HALEY:  No results found for this or any previous visit.    ASSESSMENT/PLAN:           Pre-op Assessment    I have reviewed the Patient Summary Reports.     I have reviewed the Nursing Notes. I have reviewed the NPO Status.   I have reviewed the Medications.     Review of Systems  Anesthesia Hx:  No problems with previous Anesthesia             Denies Family Hx of Anesthesia complications.    Denies Personal Hx of Anesthesia complications.                    Social:  Smoker, No Alcohol Use       Hematology/Oncology:       --  Anemia:                                  EENT/Dental:  EENT/Dental Normal           Cardiovascular:     Hypertension Valvular problems/Murmurs Past MI CAD     Denies Dysrhythmias.   CHF   PVD hyperlipidemia MASTERS                            Pulmonary:   COPD  Denies Asthma.  Shortness of breath   Denies Sleep Apnea.                Renal/:  Chronic Renal Disease, CKD                Hepatic/GI:      Denies GERD. Denies Liver Disease.            Neurological:   CVA    Denies Seizures.    neurofibromatosis                            Endocrine:  Denies Diabetes.           Psych:  Psychiatric History  depression                Physical Exam  General: Well nourished, Cooperative, Alert and Oriented    Airway:  Mallampati: III   Mouth Opening: Normal  TM Distance: Normal  Tongue: Normal  Neck ROM: Normal ROM    Dental:  Periodontal disease  Significant periodontal disease, missing 2-3 teeth total  Skin:  Scarring left leg      Anesthesia Plan  Type of Anesthesia, risks & benefits discussed:    Anesthesia Type: MAC, Gen Natural Airway, Gen ETT, Regional  Intra-op Monitoring Plan: Standard ASA Monitors  Post Op Pain Control Plan: multimodal analgesia and IV/PO Opioids PRN  Induction:  IV  Airway Plan: Direct and Video, Post-Induction  Informed Consent: Informed consent signed with the Patient and all parties understand the risks and agree with anesthesia plan.  All questions answered.   ASA Score: 3  Day of Surgery Review of History & Physical: H&P Update referred to the surgeon/provider.    Ready For Surgery From Anesthesia Perspective.     .

## 2023-12-27 NOTE — PLAN OF CARE
12/27/23 0957   Post-Acute Status   Post-Acute Authorization Placement   Post-Acute Placement Status Referrals Sent   Hospital Resources/Appts/Education Provided Provided patient/caregiver with written discharge plan information   Discharge Plan   Discharge Plan A Skilled Nursing Facility   Discharge Plan B Home with family       Met with patient to review discharge recommendation of SNF and is agreeable to plan    Patient/family provided list of facilities in-network with patient's payor plan. Providers that are owned, operated, or affiliated with [facility] are included on the list.     Notified that referral sent to below listed facilities from in-network list based on proximity to home/family support:     [facility] [place] Phone: [phone]    1 negative within 72 hours  * COVID-19: NOT able to accept COVID-19 positive patients [address] [facility] [place] [address] -  12/27/2023 11:57 (CT)  -  -  -  -       [facility] Phone: [phone]      * COVID-19: Willing/Equipped to accept COVID-19 positive patients [address] [place] [address] -  12/27/2023 11:57 (CT)  -  -  -  -       [facility] Phone: [phone]    24 Hours Fever Free  * COVID-19: Positive patients under care,* COVID-19: Willing/Equipped to accept COVID-19 positive patients,* High-Risk Isolation Patients: Willing/Equipped to accept High-Risk Isolation Patients [address] [place] [address] -  12/27/2023 11:57 (CT)  -  -  -  -       [facility] Phone: [phone]      * COVID-19: Positive patients under care,* COVID-19: Services not specified,* High-Risk Isolation Patients: Willing/Equipped to accept High-Risk Isolation Patients [address] [place] [address] -  12/27/2023 11:57 (CT)  -  -  -  -       [facility] Phone: [phone]    IF NOT VACCINATED WILL NEED A  NEGATIVE TEST RESULT  * COVID-19: NOT able to accept COVID-19 positive patients [address] [place] [address] -  12/27/2023 11:57 (CT)  -  -  -  -       [facility] Phone: [phone]      * COVID-19: NOT able to accept COVID-19 positive patients [address] [place] [address] -  12/27/2023 11:57 (CT)  -  -  -  -       [facility] [place] Phone: [phone]      * COVID-19: NOT able to accept COVID-19 positive patients [address] [place] [address] -  12/27/2023 11:57 (CT)  -  -  -  -       [facility] [place] Phone: [phone]    negative covid within 24 hours of discharge  * COVID-19: NOT able to accept COVID-19 positive patients [address] [place] [address] -  12/27/2023 11:57 (CT)  -  -  -  -       [facility] Phone: [phone]    1 negative within 24 hrs of admit covid test  * COVID-19: Willing/Equipped to accept COVID-19 positive patients,* High-Risk Isolation Patients: Willing/Equipped to accept High-Risk Isolation Patients [address] [place] [address] -  12/27/2023 11:57 (CT)  -  -  -  -       [facility] Phone: [phone]      * COVID-19: Willing/Equipped to accept COVID-19 positive patients,* High-Risk Isolation Patients: Willing/Equipped to accept High-Risk Isolation Patients [address] [place] [address] -  12/27/2023 11:57 (CT)  -  -  -  -       [facility] Phone: [phone]    2 covid neg results, 1 72 hrs prior to actual dc  * COVID-19: Services not specified,* High-Risk Isolation Patients: Willing/Equipped to accept High-Risk Isolation Patients [address] [place] [address] -  12/27/2023 11:57 (CT)  -  -  -  -       [facility] Phone: [phone]    negative covid test  * COVID-19: Willing/Equipped to accept COVID-19 positive patients,* High-Risk Isolation Patients:  Willing/Equipped to accept High-Risk Isolation Patients [address] [place] [place] [address] -  12/27/2023 11:57 (CT)  -  -  -  -       [facility] Phone: [phone]    RAPID TEST DAY OF DC.  * COVID-19: Positive patients under care,* COVID-19: Willing/Equipped to accept COVID-19 positive patients [address] [facility] [place] [address] -  12/27/2023 11:57 (CT)  -  -  -  -       [facility] Phone: [phone]      * COVID-19: NOT able to accept COVID-19 positive patients [address] [place] [address] -  12/27/2023 11:57 (CT)  -  -  -  -       [facility] Phone: [phone]    Please contact Sunita Germain via [employer] secure chat for any questions regarding skilled and or ltac referrals/admissions. Thank you :)  * COVID-19: Willing/Equipped to accept COVID-19 positive patients,* High-Risk Isolation Patients: Willing/Equipped to accept High-Risk Isolation Patients [address] [address] [place] [address] -  12/27/2023 11:57 (CT)  -  -  -  -       [facility] Phone: [phone]    1 negative test within 72 hours prior to admission  * COVID-19: NOT able to accept COVID-19 positive patients [address] [place] [facility] [address] [place] [address] -  12/27/2023 11:57 (CT)  -  -  -  -       [facility] Phone: [phone]    1 negative test in last 72 hours  * COVID-19: NOT able to accept COVID-19 positive patients [address] [place] [address] -  12/27/2023 11:57 (CT)  -  -  -  -       [facility] Phone: [phone]    Covid SS preferred. Testing preferred but not required.  * COVID-19: Willing/Equipped to accept COVID-19 positive patients [address] [place] [address] -  12/27/2023 11:57 (CT)  -  -  -  -       [facility] Phone: [phone]    We ask the referring hospital for a covid test prior to admission,  if unable we will perform a rapid test once they arrive to our facility.  * COVID-19: NOT able to accept COVID-19 positive patients,* High-Risk Isolation Patients: Willing/Equipped to accept High-Risk Isolation Patients [address] [place] [address] -  12/27/2023 11:57 (CT)  -  -  -  -       [facility] Phone: [phone]    2 negative Covid Tests  * COVID-19: Willing/Equipped to accept COVID-19 positive patients,* High-Risk Isolation Patients: Willing/Equipped to accept High-Risk Isolation Patients [address] [place] [address] -  12/27/2023 11:57 (CT)  -  -  -  -       [facility] Phone: [phone]      * COVID-19: Willing/Equipped to accept COVID-19 positive patients,* High-Risk Isolation Patients: Willing/Equipped to accept High-Risk Isolation Patients [address] [place] [address] -  12/27/2023 11:57 (CT)  -  -  -  -       [facility] Phone: [phone]    NONE  * COVID-19: NOT able to accept COVID-19 positive patients,* COVID-19: Positive patients under care [address] [place] [address] -  12/27/2023 11:57 (CT)  -  -  -  -       [facility] Phone: [phone]      * COVID-19: NOT able to accept COVID-19 positive patients [address] [place] [address] -  12/27/2023 11:57 (CT)  -  -  -  -       [facility] Phone: [phone]    covid test within 72 hours  * COVID-19: NOT able to accept COVID-19 positive patients,* High-Risk Isolation Patients: Willing/Equipped to accept High-Risk Isolation Patients [address] [place] [address] -  12/27/2023 11:57 (CT)  -  -  -  -       [facility] Phone: [phone]    negative covid test 48hours prior to admissions.  * COVID-19: NOT able to accept COVID-19 positive patients,* High-Risk Isolation Patients: Willing/Equipped to accept  High-Risk Isolation Patients [facility] [place] [address] -  12/27/2023 11:57 (CT)  -              Patient/family instructed to identify preference.    Preferred Facility: (if more than 1, listed in order of descending preference)  [facility]     If an additional preferred facility not listed above is identified, additional referral to be sent. If above facilities unable to accept, will send additional referrals to in-network providers.

## 2023-12-27 NOTE — NURSING
Pharmacist, Rad called this RN on [employer] to report two PTT results from the lab from the same time.  Charge RNSarah was notified.  During shift change, myself and Eloy cameron RN saw the first pTT result of 34.  Patient's heparin drip was adjusted per Heparin Nomagram.  It was decided that the first result of 34 was the valid result because during sign off there was only one PTT result of 34.  A pTT was ordered for 13:30.

## 2023-12-27 NOTE — RESPIRATORY THERAPY
"RAPID RESPONSE RESPIRATORY THERAPY ETCO2 CHECK         Time of visit: 949     Code Status: Full Code   : 1960  Bed: 1006/1006 A:   MRN: [id number]  Time spent at the bedside: < 15 min    SITUATION    Evaluated patient for: ETCo2 compliance    BACKGROUND    Why is the patient in the hospital?: Thrombosis of femoro-popliteal bypass graft    Patient has a past medical history of Abnormal PFT, Cardiac murmur, Cardiomegaly, CKD (chronic kidney disease), Coronary artery disease, Depression, DVT (deep venous thrombosis), Dyspnea, Edema, Episode of transient neurologic symptoms, Essential hypertension, GI bleed, Heart attack, High cholesterol, Hypertension, Mixed hyperlipidemia, PVD (peripheral vascular disease), Stroke, and Thrombotic stroke involving right middle cerebral artery.    24 Hours Vitals Range:  Temp:  [97.5 °F (36.4 °C)-98.6 °F (37 °C)]   Pulse:  [68-74]   Resp:  [14-19]   BP: (101-195)/(58-88)   SpO2:  [93 %-99 %]     Labs:    Recent Labs     23  0315 23  0906 23  0555   * 137 139   K 4.6 5.1 3.8    102 105   CO2 24 27 24   BUN 27* 17 15   CREATININE 0.8 0.8 0.7   * 100 95        No results for input(s): "PH", "PCO2", "PO2", "HCO3", "POCSATURATED", "BE" in the last 72 hours.    ASSESSMENT/INTERVENTIONS      Last VS   Temp: 97.5 °F (36.4 °C) ( 1054)  Pulse: 72 ( 1054)  Resp: 18 ( 105)  BP: 161/70 ( 1054)  SpO2: 99 % ( 105)    Level of Consciousness: Level of Consciousness (AVPU): alert  Respiratory Effort: Respiratory Effort: Normal, Unlabored Expansion/Accessory Muscle Usage: Expansion/Accessory Muscles/Retractions: no use of accessory muscles, no retractions  All Lung Field Breath Sounds: All Lung Fields Breath Sounds: Anterior:, Lateral:, diminished  Is the ETCO2 monitor on? Yes  Is the patient wearing a cannula? Yes  Are ETCO2 orders placed? Yes  Is the patient on a PCA pump? Yes  ETCO2 monitored: ETCO2 (mmHg): 32 mmHg  Ambu at bedside:  "     Active Orders   Respiratory Care    END TIDAL CO2 MONITOR Q12H     Frequency: Q12H     Number of Occurrences: Until Specified    Incentive spirometry     Frequency: Daily     Number of Occurrences: Until Specified    Oxygen Continuous     Frequency: Continuous     Number of Occurrences: Until Specified     Order Questions:      Device type: Low flow      Device: Nasal Cannula (1- 5 Liters)      LPM: 2      Titrate O2 per Oxygen Titration Protocol: Yes      To maintain SpO2 goal of: >= 90%      Notify MD of: Inability to achieve desired SpO2; Sudden change in patient status and requires 20% increase in FiO2; Patient requires >60% FiO2    Pulse Oximetry Q4H     Frequency: Q4H     Number of Occurrences: Until Specified       RECOMMENDATIONS    We recommend: RRT Recs: Continue POC per primary team.      FOLLOW-UP    Please call back the Rapid Response RT, Amari Zapata RRT at x [phone] for any questions or concerns.

## 2023-12-27 NOTE — ASSESSMENT & PLAN NOTE
63 M with long history of PAD, s/p SFA to above the knee popliteal artery bypass, current smoker, presents as a transfer due to concern for decreased sensation with concern for rest pain secondary to occluded bypass graft now s/p angiogram, thrombolysis, and posterior tibial artery exploration.    Unfortunately, no good options for revascularization of this patients left leg. Will plan on left above knee amputation, timing to be determined. After long discussion with patient this morning he is amenable to that surgery.     -regular diet   -continue aspirin, statin  -Patient on high intesnity heparin, hold heparin on midnight on 12/28   -Pain control with multimodal and PCA  -keep dressing clean and dry.  -L AKA 12/28  -NPO at midnight, discussed NPO status with patient     Dispo:  Patient will need SNF v rehab

## 2023-12-27 NOTE — NURSING
[facility] Plan of Care Note    Dx Thrombosis of femoro-popliteal bypass graft     Shift Events Heparin drip restarted currently at 20u/kg/hr  Straight cathed at 1818. Needs PV bladder scan around midnight . Tele DC d/t pt refusal     Goals of Care: pain management, Heparin drip     Neuro: AAO X 4     Vital Signs: VSS     Respiratory: RA     Diet: Cardiac      Is patient tolerating current diet? yes     GTTS: Heparin @ 20units/kg/hr---   Urine Output/Bowel Movement: adequate urine output with marie catheter     Drains/Tubes/Tube Feeds (include total output/shift): none     Lines: R PIV placed via picc team    Accuchecks: none     Skin: incision to R groin, left ankle      Fall Risk Score: 18     Activity level? Up with assist X 1     Any scheduled procedures? 12/28 aka scheduled     Any safety concerns? Fall precautions/ bleeding precautions     Other:

## 2023-12-27 NOTE — SUBJECTIVE & OBJECTIVE
Medications:  Continuous Infusions:   heparin (porcine) in D5W 24 Units/kg/hr (12/27/23 0736)    hydromorphone in 0.9 % NaCl 6 mg/30 ml       Scheduled Meds:   acetaminophen  1,000 mg Oral TID    aspirin  81 mg Oral Daily    atorvastatin  80 mg Oral Daily    carvediloL  6.25 mg Oral BID WM    ceFAZolin (ANCEF) IVPB  2 g Intravenous Q8H    divalproex  125 mg Oral TID    pantoprazole  40 mg Oral Daily    QUEtiapine  25 mg Oral QHS     PRN Meds:heparin (PORCINE), heparin (PORCINE), hydrALAZINE, HYDROmorphone, hydrOXYzine, labetalol, melatonin, naloxone, ondansetron, promethazine, sodium chloride 0.9%     Objective:     Vital Signs (Most Recent):  Temp: 98.5 °F (36.9 °C) (12/27/23 0727)  Pulse: 73 (12/27/23 0727)  Resp: 18 (12/27/23 0727)  BP: (!) 187/82 (12/27/23 0727)  SpO2: 97 % (12/27/23 0727) Vital Signs (24h Range):  Temp:  [97.6 °F (36.4 °C)-99.8 °F (37.7 °C)] 98.5 °F (36.9 °C)  Pulse:  [68-87] 73  Resp:  [14-19] 18  SpO2:  [93 %-97 %] 97 %  BP: (101-195)/(58-88) 187/82          Physical Exam  Constitutional:       Appearance: Normal appearance.   HENT:      Head: Normocephalic and atraumatic.      Nose: Nose normal.      Mouth/Throat:      Mouth: Mucous membranes are moist.      Pharynx: Oropharynx is clear.   Eyes:      Extraocular Movements: Extraocular movements intact.      Conjunctiva/sclera: Conjunctivae normal.   Cardiovascular:      Rate and Rhythm: Normal rate and regular rhythm.      Comments:     Pulmonary:      Effort: Pulmonary effort is normal.      Breath sounds: Normal breath sounds.   Abdominal:      General: Abdomen is flat.      Palpations: Abdomen is soft.   Musculoskeletal:      Comments: Tender to palpation left foot and calf  Strength diminished in left knee flexion, extension and ankle flexion and extension, chronic  Sensation dimninished in left compared to right in left lower extremity (chronic)   Skin:     General: Skin is warm and dry.      Capillary Refill: Capillary refill takes  less than 2 seconds.      Comments: Large well healed scar from prior medial left leg fasciotomy   L lower leg medial incision c/d/I    Neurological:      General: No focal deficit present.      Mental Status: He is alert.          Significant Labs:  CBC:   Recent Labs   Lab 12/26/23  0945   WBC 9.83   RBC 4.34*   HGB 13.4*   HCT 39.1*   *   MCV 90   MCH 30.9   MCHC 34.3     CMP:   Recent Labs   Lab 12/27/23  0555   GLU 95   CALCIUM 8.7   ALBUMIN 2.6*   PROT 5.9*      K 3.8   CO2 24      BUN 15   CREATININE 0.7   ALKPHOS 87   ALT 20   AST 34   BILITOT 0.7       Significant Diagnostics:  I have reviewed all pertinent imaging results/findings within the past 24 hours.

## 2023-12-27 NOTE — PROGRESS NOTES
[facility]  Vascular Surgery  Progress Note    Patient Name: Jamari Huerta III  MRN: [id number]  Admission Date: 12/21/2023  Primary Care Provider: Nataliya Anderson MD    Subjective:     Interval History: NAEON, patient denies pain to leg, just discomfort his left foot.  Patient will need to be NPO and have heparin drip stopped @ midnight.       Post-Op Info:  Procedure(s) (LRB):  Angiogram Extremity Unilateral (Left)  EXPLORATION, Posterior tibial artery (Left)   4 Days Post-Op     Medications:  Continuous Infusions:   heparin (porcine) in D5W 24 Units/kg/hr (12/27/23 0736)    hydromorphone in 0.9 % NaCl 6 mg/30 ml       Scheduled Meds:   acetaminophen  1,000 mg Oral TID    aspirin  81 mg Oral Daily    atorvastatin  80 mg Oral Daily    carvediloL  6.25 mg Oral BID WM    ceFAZolin (ANCEF) IVPB  2 g Intravenous Q8H    divalproex  125 mg Oral TID    pantoprazole  40 mg Oral Daily    QUEtiapine  25 mg Oral QHS     PRN Meds:heparin (PORCINE), heparin (PORCINE), hydrALAZINE, HYDROmorphone, hydrOXYzine, labetalol, melatonin, naloxone, ondansetron, promethazine, sodium chloride 0.9%     Objective:     Vital Signs (Most Recent):  Temp: 98.5 °F (36.9 °C) (12/27/23 0727)  Pulse: 73 (12/27/23 0727)  Resp: 18 (12/27/23 0727)  BP: (!) 187/82 (12/27/23 0727)  SpO2: 97 % (12/27/23 0727) Vital Signs (24h Range):  Temp:  [97.6 °F (36.4 °C)-99.8 °F (37.7 °C)] 98.5 °F (36.9 °C)  Pulse:  [68-87] 73  Resp:  [14-19] 18  SpO2:  [93 %-97 %] 97 %  BP: (101-195)/(58-88) 187/82          Physical Exam  Constitutional:       Appearance: Normal appearance.   HENT:      Head: Normocephalic and atraumatic.      Nose: Nose normal.      Mouth/Throat:      Mouth: Mucous membranes are moist.      Pharynx: Oropharynx is clear.   Eyes:      Extraocular Movements: Extraocular movements intact.      Conjunctiva/sclera: Conjunctivae normal.   Cardiovascular:      Rate and Rhythm: Normal rate and regular rhythm.      Comments:     Pulmonary:       Effort: Pulmonary effort is normal.      Breath sounds: Normal breath sounds.   Abdominal:      General: Abdomen is flat.      Palpations: Abdomen is soft.   Musculoskeletal:      Comments: Tender to palpation left foot and calf  Strength diminished in left knee flexion, extension and ankle flexion and extension, chronic  Sensation dimninished in left compared to right in left lower extremity (chronic)   Skin:     General: Skin is warm and dry.      Capillary Refill: Capillary refill takes less than 2 seconds.      Comments: Large well healed scar from prior medial left leg fasciotomy   L lower leg medial incision c/d/I    Neurological:      General: No focal deficit present.      Mental Status: He is alert.          Significant Labs:  CBC:   Recent Labs   Lab 12/26/23  0945   WBC 9.83   RBC 4.34*   HGB 13.4*   HCT 39.1*   *   MCV 90   MCH 30.9   MCHC 34.3     CMP:   Recent Labs   Lab 12/27/23  0555   GLU 95   CALCIUM 8.7   ALBUMIN 2.6*   PROT 5.9*      K 3.8   CO2 24      BUN 15   CREATININE 0.7   ALKPHOS 87   ALT 20   AST 34   BILITOT 0.7       Significant Diagnostics:  I have reviewed all pertinent imaging results/findings within the past 24 hours.  Assessment/Plan:     * Thrombosis of femoro-popliteal bypass graft  63 M with long history of PAD, s/p SFA to above the knee popliteal artery bypass, current smoker, presents as a transfer due to concern for decreased sensation with concern for rest pain secondary to occluded bypass graft now s/p angiogram, thrombolysis, and posterior tibial artery exploration.    Unfortunately, no good options for revascularization of this patients left leg. Will plan on left above knee amputation, timing to be determined. After long discussion with patient this morning he is amenable to that surgery.     -regular diet   -continue aspirin, statin  -Patient on high intesnity heparin, hold heparin on midnight on 12/28   -Pain control with multimodal and PCA  -keep  dressing clean and dry.  -L AKA 12/28  -NPO at midnight, discussed NPO status with patient     Dispo:  Patient will need SNF v rehab         Sarah Condon NP  Vascular Surgery  [facility]

## 2023-12-28 ENCOUNTER — ANESTHESIA (OUTPATIENT)
Dept: SURGERY | Facility: HOSPITAL | Age: 63
DRG: 240 | End: 2023-12-28
Payer: MEDICARE

## 2023-12-28 LAB
ALBUMIN SERPL BCP-MCNC: 2.5 G/DL (ref 3.5–5.2)
ALP SERPL-CCNC: 89 U/L (ref 55–135)
ALT SERPL W/O P-5'-P-CCNC: 16 U/L (ref 10–44)
ANION GAP SERPL CALC-SCNC: 9 MMOL/L (ref 8–16)
APTT PPP: 28.3 SEC (ref 21–32)
AST SERPL-CCNC: 25 U/L (ref 10–40)
BASOPHILS # BLD AUTO: 0.04 K/UL (ref 0–0.2)
BASOPHILS # BLD AUTO: 0.05 K/UL (ref 0–0.2)
BASOPHILS NFR BLD: 0.4 % (ref 0–1.9)
BASOPHILS NFR BLD: 0.6 % (ref 0–1.9)
BILIRUB SERPL-MCNC: 0.5 MG/DL (ref 0.1–1)
BUN SERPL-MCNC: 17 MG/DL (ref 8–23)
CALCIUM SERPL-MCNC: 8.7 MG/DL (ref 8.7–10.5)
CHLORIDE SERPL-SCNC: 104 MMOL/L (ref 95–110)
CO2 SERPL-SCNC: 25 MMOL/L (ref 23–29)
CREAT SERPL-MCNC: 0.7 MG/DL (ref 0.5–1.4)
DIFFERENTIAL METHOD BLD: ABNORMAL
DIFFERENTIAL METHOD BLD: ABNORMAL
EOSINOPHIL # BLD AUTO: 0.1 K/UL (ref 0–0.5)
EOSINOPHIL # BLD AUTO: 0.3 K/UL (ref 0–0.5)
EOSINOPHIL NFR BLD: 0.6 % (ref 0–8)
EOSINOPHIL NFR BLD: 3.7 % (ref 0–8)
ERYTHROCYTE [DISTWIDTH] IN BLOOD BY AUTOMATED COUNT: 15.9 % (ref 11.5–14.5)
ERYTHROCYTE [DISTWIDTH] IN BLOOD BY AUTOMATED COUNT: 15.9 % (ref 11.5–14.5)
EST. GFR  (NO RACE VARIABLE): >60 ML/MIN/1.73 M^2
GLUCOSE SERPL-MCNC: 92 MG/DL (ref 70–110)
HCT VFR BLD AUTO: 37.3 % (ref 40–54)
HCT VFR BLD AUTO: 39 % (ref 40–54)
HGB BLD-MCNC: 12.6 G/DL (ref 14–18)
HGB BLD-MCNC: 12.8 G/DL (ref 14–18)
IMM GRANULOCYTES # BLD AUTO: 0.1 K/UL (ref 0–0.04)
IMM GRANULOCYTES # BLD AUTO: 0.12 K/UL (ref 0–0.04)
IMM GRANULOCYTES NFR BLD AUTO: 1.1 % (ref 0–0.5)
IMM GRANULOCYTES NFR BLD AUTO: 1.1 % (ref 0–0.5)
LYMPHOCYTES # BLD AUTO: 0.3 K/UL (ref 1–4.8)
LYMPHOCYTES # BLD AUTO: 0.8 K/UL (ref 1–4.8)
LYMPHOCYTES NFR BLD: 3 % (ref 18–48)
LYMPHOCYTES NFR BLD: 8.7 % (ref 18–48)
MAGNESIUM SERPL-MCNC: 1.9 MG/DL (ref 1.6–2.6)
MCH RBC QN AUTO: 30.1 PG (ref 27–31)
MCH RBC QN AUTO: 30.4 PG (ref 27–31)
MCHC RBC AUTO-ENTMCNC: 32.8 G/DL (ref 32–36)
MCHC RBC AUTO-ENTMCNC: 33.8 G/DL (ref 32–36)
MCV RBC AUTO: 90 FL (ref 82–98)
MCV RBC AUTO: 92 FL (ref 82–98)
MONOCYTES # BLD AUTO: 0.3 K/UL (ref 0.3–1)
MONOCYTES # BLD AUTO: 0.9 K/UL (ref 0.3–1)
MONOCYTES NFR BLD: 2.8 % (ref 4–15)
MONOCYTES NFR BLD: 9.8 % (ref 4–15)
NEUTROPHILS # BLD AUTO: 10.1 K/UL (ref 1.8–7.7)
NEUTROPHILS # BLD AUTO: 6.7 K/UL (ref 1.8–7.7)
NEUTROPHILS NFR BLD: 76.1 % (ref 38–73)
NEUTROPHILS NFR BLD: 92.1 % (ref 38–73)
NRBC BLD-RTO: 0 /100 WBC
NRBC BLD-RTO: 0 /100 WBC
PHOSPHATE SERPL-MCNC: 3.2 MG/DL (ref 2.7–4.5)
PLATELET # BLD AUTO: 117 K/UL (ref 150–450)
PLATELET # BLD AUTO: 120 K/UL (ref 150–450)
PMV BLD AUTO: 10.3 FL (ref 9.2–12.9)
PMV BLD AUTO: 11 FL (ref 9.2–12.9)
POTASSIUM SERPL-SCNC: 4 MMOL/L (ref 3.5–5.1)
PROT SERPL-MCNC: 5.9 G/DL (ref 6–8.4)
RBC # BLD AUTO: 4.15 M/UL (ref 4.6–6.2)
RBC # BLD AUTO: 4.25 M/UL (ref 4.6–6.2)
SODIUM SERPL-SCNC: 138 MMOL/L (ref 136–145)
WBC # BLD AUTO: 10.96 K/UL (ref 3.9–12.7)
WBC # BLD AUTO: 8.81 K/UL (ref 3.9–12.7)

## 2023-12-28 PROCEDURE — 0Y6D0Z3 DETACHMENT AT LEFT UPPER LEG, LOW, OPEN APPROACH: ICD-10-PCS | Performed by: SURGERY

## 2023-12-28 PROCEDURE — 25000003 PHARM REV CODE 250: Performed by: STUDENT IN AN ORGANIZED HEALTH CARE EDUCATION/TRAINING PROGRAM

## 2023-12-28 PROCEDURE — 88307 TISSUE EXAM BY PATHOLOGIST: CPT | Performed by: STUDENT IN AN ORGANIZED HEALTH CARE EDUCATION/TRAINING PROGRAM

## 2023-12-28 PROCEDURE — 63600175 PHARM REV CODE 636 W HCPCS

## 2023-12-28 PROCEDURE — 37000008 HC ANESTHESIA 1ST 15 MINUTES: Performed by: SURGERY

## 2023-12-28 PROCEDURE — 63600175 PHARM REV CODE 636 W HCPCS: Performed by: STUDENT IN AN ORGANIZED HEALTH CARE EDUCATION/TRAINING PROGRAM

## 2023-12-28 PROCEDURE — 71000015 HC POSTOP RECOV 1ST HR: Performed by: SURGERY

## 2023-12-28 PROCEDURE — 85025 COMPLETE CBC W/AUTO DIFF WBC: CPT

## 2023-12-28 PROCEDURE — 36000710: Performed by: SURGERY

## 2023-12-28 PROCEDURE — 20600001 HC STEP DOWN PRIVATE ROOM

## 2023-12-28 PROCEDURE — 63600175 PHARM REV CODE 636 W HCPCS: Performed by: NURSE ANESTHETIST, CERTIFIED REGISTERED

## 2023-12-28 PROCEDURE — 37000009 HC ANESTHESIA EA ADD 15 MINS: Performed by: SURGERY

## 2023-12-28 PROCEDURE — 88307 TISSUE EXAM BY PATHOLOGIST: CPT | Mod: 26,,, | Performed by: STUDENT IN AN ORGANIZED HEALTH CARE EDUCATION/TRAINING PROGRAM

## 2023-12-28 PROCEDURE — 64448 NJX AA&/STRD FEM NRV NFS IMG: CPT | Mod: 59,LT,, | Performed by: ANESTHESIOLOGY

## 2023-12-28 PROCEDURE — 63600175 PHARM REV CODE 636 W HCPCS: Performed by: ANESTHESIOLOGY

## 2023-12-28 PROCEDURE — 99900035 HC TECH TIME PER 15 MIN (STAT)

## 2023-12-28 PROCEDURE — D9220A PRA ANESTHESIA: ICD-10-PCS | Mod: CRNA,,, | Performed by: NURSE ANESTHETIST, CERTIFIED REGISTERED

## 2023-12-28 PROCEDURE — 25000003 PHARM REV CODE 250: Performed by: NURSE ANESTHETIST, CERTIFIED REGISTERED

## 2023-12-28 PROCEDURE — 64445: ICD-10-PCS | Mod: 59,LT,, | Performed by: ANESTHESIOLOGY

## 2023-12-28 PROCEDURE — 25000003 PHARM REV CODE 250

## 2023-12-28 PROCEDURE — 94761 N-INVAS EAR/PLS OXIMETRY MLT: CPT

## 2023-12-28 PROCEDURE — 36000711: Performed by: SURGERY

## 2023-12-28 PROCEDURE — 64448 NJX AA&/STRD FEM NRV NFS IMG: CPT | Performed by: STUDENT IN AN ORGANIZED HEALTH CARE EDUCATION/TRAINING PROGRAM

## 2023-12-28 PROCEDURE — 36415 COLL VENOUS BLD VENIPUNCTURE: CPT | Performed by: STUDENT IN AN ORGANIZED HEALTH CARE EDUCATION/TRAINING PROGRAM

## 2023-12-28 PROCEDURE — 71000033 HC RECOVERY, INTIAL HOUR: Performed by: SURGERY

## 2023-12-28 PROCEDURE — 80053 COMPREHEN METABOLIC PANEL: CPT | Performed by: STUDENT IN AN ORGANIZED HEALTH CARE EDUCATION/TRAINING PROGRAM

## 2023-12-28 PROCEDURE — D9220A PRA ANESTHESIA: ICD-10-PCS | Mod: ANES,,, | Performed by: STUDENT IN AN ORGANIZED HEALTH CARE EDUCATION/TRAINING PROGRAM

## 2023-12-28 PROCEDURE — 63600175 PHARM REV CODE 636 W HCPCS: Performed by: SURGERY

## 2023-12-28 PROCEDURE — 64445 NJX AA&/STRD SCIATIC NRV IMG: CPT | Performed by: STUDENT IN AN ORGANIZED HEALTH CARE EDUCATION/TRAINING PROGRAM

## 2023-12-28 PROCEDURE — 64445 NJX AA&/STRD SCIATIC NRV IMG: CPT | Mod: 59,LT,, | Performed by: ANESTHESIOLOGY

## 2023-12-28 PROCEDURE — 83735 ASSAY OF MAGNESIUM: CPT

## 2023-12-28 PROCEDURE — 27000221 HC OXYGEN, UP TO 24 HOURS

## 2023-12-28 PROCEDURE — 27201423 OPTIME MED/SURG SUP & DEVICES STERILE SUPPLY: Performed by: SURGERY

## 2023-12-28 PROCEDURE — D9220A PRA ANESTHESIA: Mod: CRNA,,, | Performed by: NURSE ANESTHETIST, CERTIFIED REGISTERED

## 2023-12-28 PROCEDURE — 85730 THROMBOPLASTIN TIME PARTIAL: CPT

## 2023-12-28 PROCEDURE — 84100 ASSAY OF PHOSPHORUS: CPT

## 2023-12-28 PROCEDURE — 27880 AMPUTATION OF LOWER LEG: CPT | Mod: 58,LT,, | Performed by: SURGERY

## 2023-12-28 PROCEDURE — 85025 COMPLETE CBC W/AUTO DIFF WBC: CPT | Mod: 91 | Performed by: SURGERY

## 2023-12-28 PROCEDURE — D9220A PRA ANESTHESIA: Mod: ANES,,, | Performed by: STUDENT IN AN ORGANIZED HEALTH CARE EDUCATION/TRAINING PROGRAM

## 2023-12-28 PROCEDURE — 71000016 HC POSTOP RECOV ADDL HR: Performed by: SURGERY

## 2023-12-28 PROCEDURE — 64448 L FEMORAL PNC: ICD-10-PCS | Mod: 59,LT,, | Performed by: ANESTHESIOLOGY

## 2023-12-28 RX ORDER — ONDANSETRON 2 MG/ML
INJECTION INTRAMUSCULAR; INTRAVENOUS
Status: DISCONTINUED | OUTPATIENT
Start: 2023-12-28 | End: 2023-12-28

## 2023-12-28 RX ORDER — MIDAZOLAM HYDROCHLORIDE 1 MG/ML
INJECTION, SOLUTION INTRAMUSCULAR; INTRAVENOUS
Status: DISCONTINUED | OUTPATIENT
Start: 2023-12-28 | End: 2023-12-28

## 2023-12-28 RX ORDER — OXYCODONE HYDROCHLORIDE 10 MG/1
10 TABLET ORAL
Status: DISCONTINUED | OUTPATIENT
Start: 2023-12-28 | End: 2023-12-29

## 2023-12-28 RX ORDER — METHOCARBAMOL 500 MG/1
500 TABLET, FILM COATED ORAL EVERY 6 HOURS
Status: DISCONTINUED | OUTPATIENT
Start: 2023-12-28 | End: 2023-12-28

## 2023-12-28 RX ORDER — FENTANYL CITRATE 50 UG/ML
25-200 INJECTION, SOLUTION INTRAMUSCULAR; INTRAVENOUS
Status: DISCONTINUED | OUTPATIENT
Start: 2023-12-28 | End: 2023-12-28 | Stop reason: HOSPADM

## 2023-12-28 RX ORDER — SODIUM CHLORIDE 0.9 % (FLUSH) 0.9 %
10 SYRINGE (ML) INJECTION
Status: DISCONTINUED | OUTPATIENT
Start: 2023-12-28 | End: 2023-12-28 | Stop reason: HOSPADM

## 2023-12-28 RX ORDER — HEPARIN SODIUM 5000 [USP'U]/ML
5000 INJECTION, SOLUTION INTRAVENOUS; SUBCUTANEOUS EVERY 8 HOURS
Status: DISCONTINUED | OUTPATIENT
Start: 2023-12-28 | End: 2024-01-05 | Stop reason: HOSPADM

## 2023-12-28 RX ORDER — HYDROMORPHONE HYDROCHLORIDE 1 MG/ML
0.5 INJECTION, SOLUTION INTRAMUSCULAR; INTRAVENOUS; SUBCUTANEOUS
Status: DISCONTINUED | OUTPATIENT
Start: 2023-12-28 | End: 2023-12-29

## 2023-12-28 RX ORDER — ROPIVACAINE HYDROCHLORIDE 2 MG/ML
0.1 INJECTION, SOLUTION EPIDURAL; INFILTRATION; PERINEURAL CONTINUOUS
Status: DISCONTINUED | OUTPATIENT
Start: 2023-12-28 | End: 2024-01-01

## 2023-12-28 RX ORDER — HYDROMORPHONE HYDROCHLORIDE 1 MG/ML
0.2 INJECTION, SOLUTION INTRAMUSCULAR; INTRAVENOUS; SUBCUTANEOUS EVERY 5 MIN PRN
Status: DISCONTINUED | OUTPATIENT
Start: 2023-12-28 | End: 2023-12-28 | Stop reason: HOSPADM

## 2023-12-28 RX ORDER — PROPOFOL 10 MG/ML
VIAL (ML) INTRAVENOUS
Status: DISCONTINUED | OUTPATIENT
Start: 2023-12-28 | End: 2023-12-28

## 2023-12-28 RX ORDER — CEFAZOLIN SODIUM 1 G/3ML
INJECTION, POWDER, FOR SOLUTION INTRAMUSCULAR; INTRAVENOUS
Status: DISCONTINUED | OUTPATIENT
Start: 2023-12-28 | End: 2023-12-28

## 2023-12-28 RX ORDER — ROPIVACAINE HYDROCHLORIDE 5 MG/ML
INJECTION, SOLUTION EPIDURAL; INFILTRATION; PERINEURAL
Status: COMPLETED | OUTPATIENT
Start: 2023-12-28 | End: 2023-12-28

## 2023-12-28 RX ORDER — DEXMEDETOMIDINE HYDROCHLORIDE 100 UG/ML
INJECTION, SOLUTION INTRAVENOUS
Status: DISCONTINUED | OUTPATIENT
Start: 2023-12-28 | End: 2023-12-28

## 2023-12-28 RX ORDER — HALOPERIDOL 5 MG/ML
0.5 INJECTION INTRAMUSCULAR EVERY 10 MIN PRN
Status: DISCONTINUED | OUTPATIENT
Start: 2023-12-28 | End: 2023-12-28 | Stop reason: HOSPADM

## 2023-12-28 RX ORDER — PREGABALIN 150 MG/1
150 CAPSULE ORAL NIGHTLY
Status: DISCONTINUED | OUTPATIENT
Start: 2023-12-28 | End: 2023-12-30

## 2023-12-28 RX ORDER — METHOCARBAMOL 500 MG/1
1000 TABLET, FILM COATED ORAL EVERY 6 HOURS
Status: DISCONTINUED | OUTPATIENT
Start: 2023-12-28 | End: 2024-01-05 | Stop reason: HOSPADM

## 2023-12-28 RX ORDER — OXYCODONE HYDROCHLORIDE 5 MG/1
5 TABLET ORAL
Status: DISCONTINUED | OUTPATIENT
Start: 2023-12-28 | End: 2024-01-01

## 2023-12-28 RX ORDER — FENTANYL CITRATE 50 UG/ML
INJECTION, SOLUTION INTRAMUSCULAR; INTRAVENOUS
Status: DISCONTINUED | OUTPATIENT
Start: 2023-12-28 | End: 2023-12-28

## 2023-12-28 RX ORDER — MIDAZOLAM HYDROCHLORIDE 1 MG/ML
.5-4 INJECTION INTRAMUSCULAR; INTRAVENOUS
Status: DISCONTINUED | OUTPATIENT
Start: 2023-12-28 | End: 2023-12-28 | Stop reason: HOSPADM

## 2023-12-28 RX ORDER — MIDAZOLAM HYDROCHLORIDE 1 MG/ML
INJECTION INTRAMUSCULAR; INTRAVENOUS
Status: COMPLETED
Start: 2023-12-28 | End: 2023-12-28

## 2023-12-28 RX ORDER — DEXAMETHASONE SODIUM PHOSPHATE 4 MG/ML
INJECTION, SOLUTION INTRA-ARTICULAR; INTRALESIONAL; INTRAMUSCULAR; INTRAVENOUS; SOFT TISSUE
Status: DISCONTINUED | OUTPATIENT
Start: 2023-12-28 | End: 2023-12-28

## 2023-12-28 RX ORDER — FENTANYL CITRATE 50 UG/ML
INJECTION, SOLUTION INTRAMUSCULAR; INTRAVENOUS
Status: COMPLETED
Start: 2023-12-28 | End: 2023-12-28

## 2023-12-28 RX ADMIN — PROPOFOL 120 MG: 10 INJECTION, EMULSION INTRAVENOUS at 10:12

## 2023-12-28 RX ADMIN — DIVALPROEX SODIUM 125 MG: 125 TABLET, DELAYED RELEASE ORAL at 04:12

## 2023-12-28 RX ADMIN — CEFAZOLIN 2 G: 2 INJECTION, POWDER, FOR SOLUTION INTRAMUSCULAR; INTRAVENOUS at 10:12

## 2023-12-28 RX ADMIN — FENTANYL CITRATE 50 MCG: 50 INJECTION INTRAMUSCULAR; INTRAVENOUS at 08:12

## 2023-12-28 RX ADMIN — ONDANSETRON 4 MG: 2 INJECTION INTRAMUSCULAR; INTRAVENOUS at 12:12

## 2023-12-28 RX ADMIN — MIDAZOLAM HYDROCHLORIDE 2 MG: 1 INJECTION INTRAMUSCULAR; INTRAVENOUS at 08:12

## 2023-12-28 RX ADMIN — DEXMEDETOMIDINE 4 MCG: 100 INJECTION, SOLUTION, CONCENTRATE INTRAVENOUS at 10:12

## 2023-12-28 RX ADMIN — CEFAZOLIN 2 G: 2 INJECTION, POWDER, FOR SOLUTION INTRAMUSCULAR; INTRAVENOUS at 06:12

## 2023-12-28 RX ADMIN — ACETAMINOPHEN 1000 MG: 325 TABLET ORAL at 04:12

## 2023-12-28 RX ADMIN — HEPARIN SODIUM 5000 UNITS: 5000 INJECTION INTRAVENOUS; SUBCUTANEOUS at 10:12

## 2023-12-28 RX ADMIN — ACETAMINOPHEN 1000 MG: 325 TABLET ORAL at 10:12

## 2023-12-28 RX ADMIN — DEXAMETHASONE SODIUM PHOSPHATE 4 MG: 4 INJECTION, SOLUTION INTRAMUSCULAR; INTRAVENOUS at 10:12

## 2023-12-28 RX ADMIN — ATORVASTATIN CALCIUM 80 MG: 40 TABLET, FILM COATED ORAL at 04:12

## 2023-12-28 RX ADMIN — DEXMEDETOMIDINE 8 MCG: 100 INJECTION, SOLUTION, CONCENTRATE INTRAVENOUS at 10:12

## 2023-12-28 RX ADMIN — DEXMEDETOMIDINE 12 MCG: 100 INJECTION, SOLUTION, CONCENTRATE INTRAVENOUS at 12:12

## 2023-12-28 RX ADMIN — OXYCODONE HYDROCHLORIDE 10 MG: 10 TABLET ORAL at 05:12

## 2023-12-28 RX ADMIN — DIVALPROEX SODIUM 125 MG: 125 TABLET, DELAYED RELEASE ORAL at 10:12

## 2023-12-28 RX ADMIN — FENTANYL CITRATE 100 MCG: 50 INJECTION, SOLUTION INTRAMUSCULAR; INTRAVENOUS at 01:12

## 2023-12-28 RX ADMIN — FENTANYL CITRATE 50 MCG: 50 INJECTION, SOLUTION INTRAMUSCULAR; INTRAVENOUS at 10:12

## 2023-12-28 RX ADMIN — SODIUM CHLORIDE: 0.9 INJECTION, SOLUTION INTRAVENOUS at 09:12

## 2023-12-28 RX ADMIN — METHOCARBAMOL 1000 MG: 500 TABLET ORAL at 05:12

## 2023-12-28 RX ADMIN — PANTOPRAZOLE SODIUM 40 MG: 40 TABLET, DELAYED RELEASE ORAL at 04:12

## 2023-12-28 RX ADMIN — ROPIVACAINE HYDROCHLORIDE 0.1 ML/HR: 2 INJECTION, SOLUTION EPIDURAL; INFILTRATION at 01:12

## 2023-12-28 RX ADMIN — ROPIVACAINE HYDROCHLORIDE 20 ML: 5 INJECTION EPIDURAL; INFILTRATION; PERINEURAL at 08:12

## 2023-12-28 RX ADMIN — CEFAZOLIN 2 G: 2 INJECTION, POWDER, FOR SOLUTION INTRAMUSCULAR; INTRAVENOUS at 04:12

## 2023-12-28 RX ADMIN — CEFAZOLIN 2 G: 330 INJECTION, POWDER, FOR SOLUTION INTRAMUSCULAR; INTRAVENOUS at 10:12

## 2023-12-28 RX ADMIN — CARVEDILOL 6.25 MG: 6.25 TABLET, FILM COATED ORAL at 05:12

## 2023-12-28 RX ADMIN — MIDAZOLAM HYDROCHLORIDE 2 MG: 1 INJECTION, SOLUTION INTRAMUSCULAR; INTRAVENOUS at 09:12

## 2023-12-28 RX ADMIN — MIDAZOLAM 2 MG: 1 INJECTION INTRAMUSCULAR; INTRAVENOUS at 08:12

## 2023-12-28 RX ADMIN — ROPIVACAINE HYDROCHLORIDE 30 ML: 5 INJECTION, SOLUTION EPIDURAL; INFILTRATION; PERINEURAL at 08:12

## 2023-12-28 RX ADMIN — QUETIAPINE FUMARATE 25 MG: 25 TABLET ORAL at 10:12

## 2023-12-28 RX ADMIN — PREGABALIN 150 MG: 150 CAPSULE ORAL at 10:12

## 2023-12-28 NOTE — TRANSFER OF CARE
"Anesthesia Transfer of Care Note    Patient: Jamari Huerta III    Procedure(s) Performed: Procedure(s) (LRB):  AMPUTATION, ABOVE KNEE (Left)    Patient location: PACU    Anesthesia Type: general    Transport from OR: Transported from OR on 6-10 L/min O2 by face mask with adequate spontaneous ventilation    Post pain: adequate analgesia    Post assessment: no apparent anesthetic complications    Post vital signs: stable    Level of consciousness: sedated    Nausea/Vomiting: no nausea/vomiting    Complications: none    Transfer of care protocol was followed      Last vitals: Visit Vitals  /60   Pulse 68   Temp 36.9 °C (98.4 °F) (Temporal)   Resp 16   Ht 5' 10" (1.778 m)   Wt 84 kg (185 lb 3 oz)   SpO2 (!) 91%   BMI 26.57 kg/m²     "

## 2023-12-28 NOTE — SUBJECTIVE & OBJECTIVE
Medications:  Continuous Infusions:   heparin (porcine) in D5W Stopped (12/28/23 0001)    hydromorphone in 0.9 % NaCl 6 mg/30 ml       Scheduled Meds:   acetaminophen  1,000 mg Oral TID    aspirin  81 mg Oral Daily    atorvastatin  80 mg Oral Daily    carvediloL  6.25 mg Oral BID WM    ceFAZolin (ANCEF) IVPB  2 g Intravenous Q8H    divalproex  125 mg Oral TID    pantoprazole  40 mg Oral Daily    QUEtiapine  25 mg Oral QHS     PRN Meds:heparin (PORCINE), heparin (PORCINE), hydrALAZINE, HYDROmorphone, hydrOXYzine, labetalol, melatonin, naloxone, ondansetron, promethazine, sodium chloride 0.9%     Objective:     Vital Signs (Most Recent):  Temp: 98.3 °F (36.8 °C) (12/28/23 0532)  Pulse: 73 (12/28/23 0532)  Resp: 18 (12/28/23 0532)  BP: (!) 177/80 (12/28/23 0532)  SpO2: (!) 94 % (12/28/23 0532) Vital Signs (24h Range):  Temp:  [97.5 °F (36.4 °C)-98.6 °F (37 °C)] 98.3 °F (36.8 °C)  Pulse:  [70-77] 73  Resp:  [17-18] 18  SpO2:  [93 %-99 %] 94 %  BP: (135-187)/(70-82) 177/80          Physical Exam  Constitutional:       Appearance: Normal appearance.   HENT:      Head: Normocephalic and atraumatic.      Nose: Nose normal.      Mouth/Throat:      Mouth: Mucous membranes are moist.      Pharynx: Oropharynx is clear.   Eyes:      Extraocular Movements: Extraocular movements intact.      Conjunctiva/sclera: Conjunctivae normal.   Cardiovascular:      Rate and Rhythm: Normal rate and regular rhythm.      Comments:     Pulmonary:      Effort: Pulmonary effort is normal.      Breath sounds: Normal breath sounds.   Abdominal:      General: Abdomen is flat.      Palpations: Abdomen is soft.   Musculoskeletal:      Comments: Tender to palpation left foot and calf  Strength diminished in left knee flexion, extension and ankle flexion and extension, chronic  Sensation dimninished in left compared to right in left lower extremity (chronic)   Skin:     General: Skin is warm and dry.      Capillary Refill: Capillary refill takes less  than 2 seconds.      Comments: Large well healed scar from prior medial left leg fasciotomy   L lower leg medial incision c/d/I    Neurological:      General: No focal deficit present.      Mental Status: He is alert.          Significant Labs:  All pertinent labs from the last 24 hours have been reviewed.    Significant Diagnostics:  I have reviewed all pertinent imaging results/findings within the past 24 hours.

## 2023-12-28 NOTE — ANESTHESIA PROCEDURE NOTES
L Femoral PNC    Patient location during procedure: pre-op   Block not for primary anesthetic.  Reason for block: at surgeon's request and post-op pain management   Post-op Pain Location: L Leg Pain   Start time: 12/28/2023 8:13 AM  Timeout: 12/28/2023 8:12 AM   End time: 12/28/2023 8:40 AM    Staffing  Authorizing Provider: Froylan Mukherjee MD  Performing Provider: Yehuda Harper MD    Staffing  Performed by: Yehuda Harper MD  Authorized by: Froylan Mukherjee MD    Preanesthetic Checklist  Completed: patient identified, IV checked, site marked, risks and benefits discussed, surgical consent, monitors and equipment checked, pre-op evaluation and timeout performed  Peripheral Block  Patient position: supine  Prep: ChloraPrep and site prepped and draped  Patient monitoring: heart rate, cardiac monitor, continuous pulse ox, continuous capnometry and frequent blood pressure checks  Block type: femoral  Laterality: left  Injection technique: continuous  Needle  Needle type: Tuohy   Needle gauge: 17 G  Needle length: 3.5 in  Needle localization: anatomical landmarks and ultrasound guidance  Catheter type: spring wound  Catheter size: 19 G  Test dose: lidocaine 1.5% with Epi 1-to-200,000 and negative   -ultrasound image captured on disc.  Assessment  Injection assessment: negative aspiration, negative parasthesia and local visualized surrounding nerve  Paresthesia pain: none  Heart rate change: no  Slow fractionated injection: yes  Pain Tolerance: comfortable throughout block and no complaints  Medications:    Medications: ropivacaine (NAROPIN) injection 0.5% - Perineural   20 mL - 12/28/2023 8:30:00 AM    Additional Notes  VSS.  [facility] RN monitoring vitals throughout procedure.  Patient tolerated procedure well.

## 2023-12-28 NOTE — NURSING
Patient transported to the OR via stretcher with PCa pump.      0815 OR staff returned PCA pump, dilaudid wasted with witness via Pyxis.

## 2023-12-28 NOTE — ANESTHESIA PROCEDURE NOTES
L Sciatic SS    Patient location during procedure: pre-op   Block not for primary anesthetic.  Reason for block: at surgeon's request and post-op pain management   Post-op Pain Location: L Leg Pain   Start time: 12/28/2023 8:13 AM  Timeout: 12/28/2023 8:12 AM   End time: 12/28/2023 8:40 AM    Staffing  Authorizing Provider: Froylan Mukherjee MD  Performing Provider: Yehuda Harper MD    Staffing  Performed by: Yehuda Harper MD  Authorized by: Froylan Mukherjee MD    Preanesthetic Checklist  Completed: patient identified, IV checked, site marked, risks and benefits discussed, surgical consent, monitors and equipment checked, pre-op evaluation and timeout performed  Peripheral Block  Prep: ChloraPrep  Patient monitoring: heart rate, cardiac monitor, continuous pulse ox, continuous capnometry and frequent blood pressure checks  Block type: sciatic  Laterality: left  Injection technique: single shot  Needle  Needle type: Stimuplex   Needle gauge: 20 G  Needle length: 4 in  Needle localization: nerve stimulator and anatomical landmarks     Assessment  Injection assessment: negative aspiration and negative parasthesia  Paresthesia pain: none  Heart rate change: no  Slow fractionated injection: yes  Pain Tolerance: comfortable throughout block and no complaints  Medications:    Medications: ropivacaine (NAROPIN) injection 0.5% - Perineural   30 mL - 12/28/2023 8:20:00 AM    Additional Notes  Performed subgluteal.    VSS.  [facility] RN monitoring vitals throughout procedure.  Patient tolerated procedure well.

## 2023-12-28 NOTE — BRIEF OP NOTE
[facility] - Surgery ([facility])  Brief Operative Note    SUMMARY     Surgery Date: 12/28/2023     Surgeon(s) and Role:     * NATALIE Barnes II, MD - Primary     * Amador Mccarty MD - Resident - Assisting     * Ludin Chun MD - Resident - Assisting     * Jomar Ratliff MD - Resident - Assisting        Pre-op Diagnosis:  Thrombosis of femoro-popliteal bypass graft, initial encounter [T82.868A]  Critical lower limb ischemia [I70.229]  PVD (peripheral vascular disease) [I73.9]    Post-op Diagnosis:  Post-Op Diagnosis Codes:     * Thrombosis of femoro-popliteal bypass graft, initial encounter [T82.868A]     * Critical lower limb ischemia [I70.229]     * PVD (peripheral vascular disease) [I73.9]    Procedure(s) (LRB):  AMPUTATION, ABOVE KNEE (Left)    Anesthesia: General/MAC    Implants:  * No implants in log *    Operative Findings: L AKA performed    Estimated Blood Loss: 75 cc    Estimated Blood Loss has been documented.         Specimens:   Specimen (24h ago, onward)       Start     Ordered    12/28/23 1123  Specimen to Pathology, Surgery Other  Once        Comments: Pre-op Diagnosis: Thrombosis of femoro-popliteal bypass graft, initial encounter [T82.868A]Critical lower limb ischemia [I70.229]PVD (peripheral vascular disease) [I73.9]Procedure(s):AMPUTATION, ABOVE KNEE Number of specimens: 1Name of specimens: 1- Left leg above knee amputation     References:    Click here for ordering Quick Tip   Question Answer Comment   Procedure Type: Other    Specimen Class: Routine/Screening    Which provider would you like to cc? NATALIE BARNES II    Release to patient Immediate        12/28/23 1127                    [id number]    Ludin Chun MD  General Surgery PGY-1

## 2023-12-28 NOTE — NURSING
Patient returned from PACU via bed with perineural infusion pump at 0.1ml/hr to left subgluteal nerve.  Patient desatted to 88 on room air.  Patient now on 3L with O2 sat at 90%  and very drowsy.  O2 increased to 5L, O2 sat to 95%.  BP and pulse stable and documented.  Temp normal.

## 2023-12-28 NOTE — PROGRESS NOTES
Patient's Dilaudid PCA and IV pole walked back up to his inpatient room 1006 by Ceferino GALDAMEZ and given to patient's inpatient nurse.

## 2023-12-28 NOTE — PROGRESS NOTES
[facility] - [facility]  Vascular Surgery  Progress Note    Patient Name: Jamari Huerta III  MRN: [id number]  Admission Date: 12/21/2023  Primary Care Provider: Nataliya Anderson MD    Subjective:     Interval History: NPO at midnight. Pt reports frustration with call button. Has not been keeping his EtCO2 monitor on so PCA not working well. Reports pain has been uncontrolled due to this. Marked this AM. Afebrile overnight.    Post-Op Info:  Procedure(s) (LRB):  Angiogram Extremity Unilateral (Left)  EXPLORATION, Posterior tibial artery (Left)   5 Days Post-Op     Medications:  Continuous Infusions:   heparin (porcine) in D5W Stopped (12/28/23 0001)    hydromorphone in 0.9 % NaCl 6 mg/30 ml       Scheduled Meds:   acetaminophen  1,000 mg Oral TID    aspirin  81 mg Oral Daily    atorvastatin  80 mg Oral Daily    carvediloL  6.25 mg Oral BID WM    ceFAZolin (ANCEF) IVPB  2 g Intravenous Q8H    divalproex  125 mg Oral TID    pantoprazole  40 mg Oral Daily    QUEtiapine  25 mg Oral QHS     PRN Meds:heparin (PORCINE), heparin (PORCINE), hydrALAZINE, HYDROmorphone, hydrOXYzine, labetalol, melatonin, naloxone, ondansetron, promethazine, sodium chloride 0.9%     Objective:     Vital Signs (Most Recent):  Temp: 98.3 °F (36.8 °C) (12/28/23 0532)  Pulse: 73 (12/28/23 0532)  Resp: 18 (12/28/23 0532)  BP: (!) 177/80 (12/28/23 0532)  SpO2: (!) 94 % (12/28/23 0532) Vital Signs (24h Range):  Temp:  [97.5 °F (36.4 °C)-98.6 °F (37 °C)] 98.3 °F (36.8 °C)  Pulse:  [70-77] 73  Resp:  [17-18] 18  SpO2:  [93 %-99 %] 94 %  BP: (135-187)/(70-82) 177/80          Physical Exam  Constitutional:       Appearance: Normal appearance.   HENT:      Head: Normocephalic and atraumatic.      Nose: Nose normal.      Mouth/Throat:      Mouth: Mucous membranes are moist.      Pharynx: Oropharynx is clear.   Eyes:      Extraocular Movements: Extraocular movements intact.      Conjunctiva/sclera: Conjunctivae normal.   Cardiovascular:      Rate and Rhythm: Normal  rate and regular rhythm.      Comments:     Pulmonary:      Effort: Pulmonary effort is normal.      Breath sounds: Normal breath sounds.   Abdominal:      General: Abdomen is flat.      Palpations: Abdomen is soft.   Musculoskeletal:      Comments: Tender to palpation left foot and calf  Strength diminished in left knee flexion, extension and ankle flexion and extension, chronic  Sensation dimninished in left compared to right in left lower extremity (chronic)   Skin:     General: Skin is warm and dry.      Capillary Refill: Capillary refill takes less than 2 seconds.      Comments: Large well healed scar from prior medial left leg fasciotomy   L lower leg medial incision c/d/I    Neurological:      General: No focal deficit present.      Mental Status: He is alert.          Significant Labs:  All pertinent labs from the last 24 hours have been reviewed.    Significant Diagnostics:  I have reviewed all pertinent imaging results/findings within the past 24 hours.  Assessment/Plan:     * Thrombosis of femoro-popliteal bypass graft  63 M with long history of PAD, s/p SFA to above the knee popliteal artery bypass, current smoker, presents as a transfer due to concern for decreased sensation with concern for rest pain secondary to occluded bypass graft now s/p angiogram, thrombolysis, and posterior tibial artery exploration. Unfortunately, no good options for revascularization of this patients left leg. After long discussion with patient this morning he is amenable to that surgery.     - To OR today for L AKA, marked, consented  - was NPO at midnight, okay for diet after surgery  - continue aspirin, statin  - Pain control with multimodal and PCA  - keep dressing clean and dry    Dispo:  Patient will need SNF v rehab most likely after surgery        uLdin Chun MD  Vascular Surgery  [facility]

## 2023-12-28 NOTE — ANESTHESIA PROCEDURE NOTES
Intubation    Date/Time: 12/28/2023 10:04 AM    Performed by: Deion Wilson CRNA  Authorized by: Vickey Gerard MD    Intubation:     Induction:  Intravenous    Intubated:  Postinduction    Mask Ventilation:  Not attempted    Attempts:  1    Attempted By:  CRNA    Difficult Airway Encountered?: No      Complications:  None    Airway Device:  Supraglottic airway/LMA    Airway Device Size:  5.0    Placement Verified By:  Capnometry    Complicating Factors:  None    Findings Post-Intubation:  BS equal bilateral and atraumatic/condition of teeth unchanged

## 2023-12-28 NOTE — PROGRESS NOTES
Patient transported to PreOp [facility] 23 via bed to do a regional nerve block. Patient brought with his Dilaudid PCA attached. PCA unhooked from patient and charge nurse notified. IV removed from his left hand due to leaking IV and swollen hand.

## 2023-12-28 NOTE — NURSING TRANSFER
Nursing Transfer Note      12/28/2023       Nurse giving handoff:Zayra GALDAMEZ    Nurse receiving handoff:Stacie RAMOS    Reason patient is being transferred: post procedure    Transfer To: room 1006    Transfer via bed    Transfer with Perineural infusion    Transported by PCT Abdiaziz and PCT Fabiana    Transfer Vital Signs: see flowsheet    Telemetry: Box Number n/a    Order for Tele Monitor? No    Additional Lines: Guido Catheter    4eyes on Skin: yes    Medicines sent: None    Any special needs or follow-up needed: routine    Patient belongings transferred with patient:  None    Chart send with patient: Yes    Notified: sister    Patient reassessed at: 12/28/2023 1500 (date, time)

## 2023-12-28 NOTE — ASSESSMENT & PLAN NOTE
63 M with long history of PAD, s/p SFA to above the knee popliteal artery bypass, current smoker, presents as a transfer due to concern for decreased sensation with concern for rest pain secondary to occluded bypass graft now s/p angiogram, thrombolysis, and posterior tibial artery exploration. Unfortunately, no good options for revascularization of this patients left leg. After long discussion with patient this morning he is amenable to that surgery.     - To OR today for L AKA, marked, consented  - was NPO at midnight, okay for diet after surgery  - continue aspirin, statin  - Pain control with multimodal and PCA  - keep dressing clean and dry    Dispo:  Patient will need SNF v rehab most likely after surgery

## 2023-12-29 LAB
ALBUMIN SERPL BCP-MCNC: 2.4 G/DL (ref 3.5–5.2)
ALP SERPL-CCNC: 93 U/L (ref 55–135)
ALT SERPL W/O P-5'-P-CCNC: 18 U/L (ref 10–44)
ANION GAP SERPL CALC-SCNC: 6 MMOL/L (ref 8–16)
AST SERPL-CCNC: 40 U/L (ref 10–40)
BASOPHILS # BLD AUTO: 0.04 K/UL (ref 0–0.2)
BASOPHILS NFR BLD: 0.4 % (ref 0–1.9)
BILIRUB SERPL-MCNC: 0.4 MG/DL (ref 0.1–1)
BUN SERPL-MCNC: 23 MG/DL (ref 8–23)
CALCIUM SERPL-MCNC: 8.3 MG/DL (ref 8.7–10.5)
CHLORIDE SERPL-SCNC: 105 MMOL/L (ref 95–110)
CO2 SERPL-SCNC: 27 MMOL/L (ref 23–29)
CREAT SERPL-MCNC: 0.8 MG/DL (ref 0.5–1.4)
DIFFERENTIAL METHOD BLD: ABNORMAL
EOSINOPHIL # BLD AUTO: 0.1 K/UL (ref 0–0.5)
EOSINOPHIL NFR BLD: 0.9 % (ref 0–8)
ERYTHROCYTE [DISTWIDTH] IN BLOOD BY AUTOMATED COUNT: 15.8 % (ref 11.5–14.5)
EST. GFR  (NO RACE VARIABLE): >60 ML/MIN/1.73 M^2
GLUCOSE SERPL-MCNC: 123 MG/DL (ref 70–110)
HCT VFR BLD AUTO: 33.5 % (ref 40–54)
HGB BLD-MCNC: 11 G/DL (ref 14–18)
IMM GRANULOCYTES # BLD AUTO: 0.1 K/UL (ref 0–0.04)
IMM GRANULOCYTES NFR BLD AUTO: 1.1 % (ref 0–0.5)
LYMPHOCYTES # BLD AUTO: 0.7 K/UL (ref 1–4.8)
LYMPHOCYTES NFR BLD: 7.4 % (ref 18–48)
MAGNESIUM SERPL-MCNC: 1.9 MG/DL (ref 1.6–2.6)
MCH RBC QN AUTO: 31.3 PG (ref 27–31)
MCHC RBC AUTO-ENTMCNC: 32.8 G/DL (ref 32–36)
MCV RBC AUTO: 95 FL (ref 82–98)
MONOCYTES # BLD AUTO: 1.2 K/UL (ref 0.3–1)
MONOCYTES NFR BLD: 12.8 % (ref 4–15)
NEUTROPHILS # BLD AUTO: 7.3 K/UL (ref 1.8–7.7)
NEUTROPHILS NFR BLD: 77.4 % (ref 38–73)
NRBC BLD-RTO: 0 /100 WBC
PHOSPHATE SERPL-MCNC: 2.4 MG/DL (ref 2.7–4.5)
PLATELET # BLD AUTO: 139 K/UL (ref 150–450)
PMV BLD AUTO: 11.2 FL (ref 9.2–12.9)
POTASSIUM SERPL-SCNC: 4.4 MMOL/L (ref 3.5–5.1)
PROT SERPL-MCNC: 5.5 G/DL (ref 6–8.4)
RBC # BLD AUTO: 3.52 M/UL (ref 4.6–6.2)
SODIUM SERPL-SCNC: 138 MMOL/L (ref 136–145)
WBC # BLD AUTO: 9.48 K/UL (ref 3.9–12.7)

## 2023-12-29 PROCEDURE — 63600175 PHARM REV CODE 636 W HCPCS: Performed by: STUDENT IN AN ORGANIZED HEALTH CARE EDUCATION/TRAINING PROGRAM

## 2023-12-29 PROCEDURE — 25000003 PHARM REV CODE 250: Performed by: STUDENT IN AN ORGANIZED HEALTH CARE EDUCATION/TRAINING PROGRAM

## 2023-12-29 PROCEDURE — 97530 THERAPEUTIC ACTIVITIES: CPT

## 2023-12-29 PROCEDURE — 97110 THERAPEUTIC EXERCISES: CPT

## 2023-12-29 PROCEDURE — 97165 OT EVAL LOW COMPLEX 30 MIN: CPT

## 2023-12-29 PROCEDURE — 80053 COMPREHEN METABOLIC PANEL: CPT | Performed by: STUDENT IN AN ORGANIZED HEALTH CARE EDUCATION/TRAINING PROGRAM

## 2023-12-29 PROCEDURE — 25000003 PHARM REV CODE 250

## 2023-12-29 PROCEDURE — 97535 SELF CARE MNGMENT TRAINING: CPT

## 2023-12-29 PROCEDURE — 20600001 HC STEP DOWN PRIVATE ROOM

## 2023-12-29 PROCEDURE — 84100 ASSAY OF PHOSPHORUS: CPT

## 2023-12-29 PROCEDURE — 97162 PT EVAL MOD COMPLEX 30 MIN: CPT

## 2023-12-29 PROCEDURE — 83735 ASSAY OF MAGNESIUM: CPT

## 2023-12-29 PROCEDURE — 85025 COMPLETE CBC W/AUTO DIFF WBC: CPT | Performed by: STUDENT IN AN ORGANIZED HEALTH CARE EDUCATION/TRAINING PROGRAM

## 2023-12-29 PROCEDURE — 36415 COLL VENOUS BLD VENIPUNCTURE: CPT | Performed by: STUDENT IN AN ORGANIZED HEALTH CARE EDUCATION/TRAINING PROGRAM

## 2023-12-29 RX ADMIN — CARVEDILOL 6.25 MG: 6.25 TABLET, FILM COATED ORAL at 04:12

## 2023-12-29 RX ADMIN — METHOCARBAMOL 1000 MG: 500 TABLET ORAL at 06:12

## 2023-12-29 RX ADMIN — DIVALPROEX SODIUM 125 MG: 125 TABLET, DELAYED RELEASE ORAL at 09:12

## 2023-12-29 RX ADMIN — CARVEDILOL 6.25 MG: 6.25 TABLET, FILM COATED ORAL at 06:12

## 2023-12-29 RX ADMIN — ATORVASTATIN CALCIUM 80 MG: 40 TABLET, FILM COATED ORAL at 09:12

## 2023-12-29 RX ADMIN — METHOCARBAMOL 1000 MG: 500 TABLET ORAL at 05:12

## 2023-12-29 RX ADMIN — ASPIRIN 81 MG CHEWABLE TABLET 81 MG: 81 TABLET CHEWABLE at 09:12

## 2023-12-29 RX ADMIN — ACETAMINOPHEN 1000 MG: 325 TABLET ORAL at 09:12

## 2023-12-29 RX ADMIN — QUETIAPINE FUMARATE 25 MG: 25 TABLET ORAL at 09:12

## 2023-12-29 RX ADMIN — HEPARIN SODIUM 5000 UNITS: 5000 INJECTION INTRAVENOUS; SUBCUTANEOUS at 02:12

## 2023-12-29 RX ADMIN — CEFAZOLIN 2 G: 2 INJECTION, POWDER, FOR SOLUTION INTRAMUSCULAR; INTRAVENOUS at 11:12

## 2023-12-29 RX ADMIN — CEFAZOLIN 2 G: 2 INJECTION, POWDER, FOR SOLUTION INTRAMUSCULAR; INTRAVENOUS at 02:12

## 2023-12-29 RX ADMIN — PANTOPRAZOLE SODIUM 40 MG: 40 TABLET, DELAYED RELEASE ORAL at 04:12

## 2023-12-29 RX ADMIN — DIVALPROEX SODIUM 125 MG: 125 TABLET, DELAYED RELEASE ORAL at 03:12

## 2023-12-29 RX ADMIN — CEFAZOLIN 2 G: 2 INJECTION, POWDER, FOR SOLUTION INTRAMUSCULAR; INTRAVENOUS at 06:12

## 2023-12-29 RX ADMIN — HEPARIN SODIUM 5000 UNITS: 5000 INJECTION INTRAVENOUS; SUBCUTANEOUS at 06:12

## 2023-12-29 RX ADMIN — METHOCARBAMOL 1000 MG: 500 TABLET ORAL at 11:12

## 2023-12-29 RX ADMIN — PREGABALIN 150 MG: 150 CAPSULE ORAL at 09:12

## 2023-12-29 RX ADMIN — OXYCODONE HYDROCHLORIDE 5 MG: 5 TABLET ORAL at 11:12

## 2023-12-29 RX ADMIN — HEPARIN SODIUM 5000 UNITS: 5000 INJECTION INTRAVENOUS; SUBCUTANEOUS at 09:12

## 2023-12-29 RX ADMIN — HYDRALAZINE HYDROCHLORIDE 10 MG: 20 INJECTION, SOLUTION INTRAMUSCULAR; INTRAVENOUS at 05:12

## 2023-12-29 RX ADMIN — METHOCARBAMOL 1000 MG: 500 TABLET ORAL at 12:12

## 2023-12-29 NOTE — ANESTHESIA POST-OP PAIN MANAGEMENT
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    Surgery:  L AKA    Post Op Day #: 1    Catheter type: perineural  femoral    Infusion type: Ropivacaine 0.2%  7q3h IB + [id number] DB    Problem List:    Active Hospital Problems    Diagnosis  POA    *Thrombosis of femoro-popliteal bypass graft [T82.868A]  Yes     SFA to above knee pop bypass        Resolved Hospital Problems   No resolved problems to display.       Subjective:     General appearance of alert, oriented, no complaints   Pain with rest: 2    Numbers   Pain with movement: 5    Numbers   Side Effects    1. Pruritis No    2. Nausea No    3. Motor Blockade No, 0=Ability to raise lower extremities off bed    4. Sedation No, 1=awake and alert    Objective:     Catheter level L Femoral   Catheter site clean, dry, intact      Vitals   Vitals:    12/29/23 0742   BP: (!) 143/65   Pulse: 83   Resp: 18   Temp: 36.6 °C (97.9 °F)        Labs    No results displayed because visit has over 200 results.           Meds   Current Facility-Administered Medications   Medication Dose Route Frequency Provider Last Rate Last Admin    acetaminophen tablet 1,000 mg  1,000 mg Oral TID LEROY Mcdonnell MD   1,000 mg at 12/28/23 2246    aspirin chewable tablet 81 mg  81 mg Oral Daily Denny Sow MD   81 mg at 12/27/23 0850    atorvastatin tablet 80 mg  80 mg Oral Daily Denny Sow MD   80 mg at 12/28/23 1600    carvediloL tablet 6.25 mg  6.25 mg Oral BID WM Denny Sow MD   6.25 mg at 12/29/23 0645    ceFAZolin 2 g in dextrose 5 % in water (D5W) 50 mL IVPB (MB+)  2 g Intravenous Q8H Denny Sow MD   Stopped at 12/29/23 0717    divalproex EC tablet 125 mg  125 mg Oral TID Denny Sow MD   125 mg at 12/28/23 2247    heparin (porcine)  injection 5,000 Units  5,000 Units Subcutaneous Q8H Jomar Ratliff MD   5,000 Units at 12/29/23 0644    hydrALAZINE injection 10 mg  10 mg Intravenous Q6H PRN Denny Sow MD   10 mg at 12/27/23 2020    HYDROmorphone injection 0.5 mg  0.5 mg Intravenous Q3H PRN Brandi Haney MD        hydrOXYzine HCL tablet 25 mg  25 mg Oral TID PRN Denny Sow MD   25 mg at 12/26/23 1321    labetalol 20 mg/4 mL (5 mg/mL) IV syring  10 mg Intravenous Q6H PRN Denny Sow MD   10 mg at 12/25/23 2256    melatonin tablet 6 mg  6 mg Oral Nightly PRN Denny Sow MD   6 mg at 12/27/23 2020    methocarbamoL tablet 1,000 mg  1,000 mg Oral Q6H Brandi Haney MD   1,000 mg at 12/29/23 0645    ondansetron disintegrating tablet 8 mg  8 mg Oral Q8H PRN Denny Sow MD        oxyCODONE immediate release tablet 5 mg  5 mg Oral Q3H PRN Brandi Haney MD        And    oxyCODONE immediate release tablet Tab 10 mg  10 mg Oral Q3H PRN Brandi Haney MD   10 mg at 12/28/23 1726    pantoprazole EC tablet 40 mg  40 mg Oral Daily Denny Sow MD   40 mg at 12/28/23 1632    pregabalin capsule 150 mg  150 mg Oral QHS Yehuda Harper MD   150 mg at 12/28/23 2247    promethazine tablet 25 mg  25 mg Oral Q6H PRN Denny Sow MD        QUEtiapine tablet 25 mg  25 mg Oral QHS Denny Sow MD   25 mg at 12/28/23 2247    ROPIvacaine (PF) 2 mg/ml (0.2%) solution  0.1 mL/hr Perineural Continuous Yehuda Harper MD 0.1 mL/hr at 12/28/23 1320 0.1 mL/hr at 12/28/23 1320    sodium chloride 0.9% flush 10 mL  10 mL Intravenous Q6H PRN Denny Sow MD            Anticoagulant dose Heparin 5000U SQ q8h.    Assessment:     Was on Dilaudid PCA prior to surgeyr for pain control of LLE. After sciatic single injection and femoral catheter placement, pain control is adequate post op.    Plan:     Patient doing well, continue present treatment.    1) Continue  Femoral PNC Ropivacaine infusion at current rate 7q3h IB w [id number]    2) Continue multimodals:   - Tylenol 1g TID  - Robaxin 1g QID  - Lyrica 150 qhs    3) No PRN's required overnight  - Oxy 5 q3h PRN   - D/c Oxy 10's and IV dilaudid      Case discussed with staff, Dr. Mukherjee; final recommendations per attestation above.     Thank you for your consult and allowing us to participate in the care of this patient. We will continue to follow along. Please call the Acute Pain Service/Anesthesia if you have any questions or concerns.     Brandi Haney, PGY4  Department of Anesthesiology  [facility]  12/29/2023 8:31 AM

## 2023-12-29 NOTE — PT/OT/SLP EVAL
Physical Therapy Evaluation  Co-evaluation with OT due to acuity of condition, level of skilled assist needed for assessment of safety with mobility.   Patient Name:  Jamari Huerta III   MRN:  [id number]    Recommendations:     Discharge Recommendations: Moderate Intensity Therapy   Discharge Equipment Recommendations: wheelchair, bath bench   Barriers to discharge: Inaccessible home, Decreased caregiver support, and patient below functional baseline    Assessment:     Jamari Huerta III is a 63 y.o. male admitted with a medical diagnosis of Thrombosis of femoro-popliteal bypass graft.  He presents with the following impairments/functional limitations: weakness, impaired self care skills, impaired functional mobility, impaired endurance, gait instability, impaired cognition, decreased upper extremity function, decreased lower extremity function, decreased coordination, decreased safety awareness, pain, decreased ROM, impaired skin, edema, impaired cardiopulmonary response to activity, impaired fine motor, orthopedic precautions. The patient has chronic L hemiplegia due to CVA in June, the patient has poor safety awareness, moves impulsively with poor motor planning in spite of therapist cuing. He performed stand pivot t/f to BSC on R with minimum assistance, therapists changed bed linens. Patient attempting to perform squat pivot to L back to bed prematurely, he was unaware that he had had BM, pt attempting to reach for therapist's neck for support during t/f with poor placement of R LE. Patient educated on importance of following therapists' cues for safety with mobility. Educated patient on new AKA positioning precautions and HEP but patient with poor attention to material, printed handout left at bedside. The patient is not safe to return home due to fall risk and lack of assist at home. Patient currently demonstrates a need for moderate intensity therapy on a daily basis post acute secondary to a decline in  "functional status due to surgical procedure, new AKA, chronic L SW.     Rehab Prognosis: Fair; patient would benefit from acute skilled PT services to address these deficits and reach maximum level of function.    Recent Surgery: Procedure(s) (LRB):  AMPUTATION, ABOVE KNEE (Left) 1 Day Post-Op    Plan:     During this hospitalization, patient to be seen 4 x/week to address the identified rehab impairments via gait training, therapeutic activities, therapeutic exercises, neuromuscular re-education and progress toward the following goals:    Plan of Care Expires:  01/28/24    Subjective     Chief Complaint: "I have to find my glasses, I need to stop sleeping with my glasses on", patient cursing and looking for glasses, found by OT inside of pillow case at end of session  Patient/Family Comments/goals: [place] [facility] [place]/Comfort:  Pain Rating 1: 10/10  Location - Side 1: Left  Location - Orientation 1: generalized  Location 1: leg  Pain Addressed 1: Reposition, Distraction  Pain Rating Post-Intervention 1: 7/10    Patients cultural, spiritual, Rastafarian conflicts given the current situation: no    Living Environment:  The patietn lives alone in a converted garage apt, 2 small steps to enter, WIS GB. Does not drive.   Prior to admission, patients level of function was modified independent with SPC for gait.  Equipment used at home: cane, straight.  DME owned (not currently used): none.  Upon discharge, patient will have limited assistance on DC.    Objective:     Communicated with RN prior to session.  Patient found HOB elevated with bed alarm, peripheral IV, oxygen, perineural catheter, marie catheter  upon PT entry to room. Patient's bed soiled with many pieces of egg from breakfast. Patient finding and throwing food out of the bed onto the floor in spite of therapist cues to stop and that therapy would assist him with linen change.     General Precautions: Standard, fall  Orthopedic " Precautions:LLE non weight bearing   Braces: N/A  Respiratory Status: Nasal cannula, flow 2 L/min, patient not wearing NC, therapists assisted him with donning it    Exams:    Cognitive Exam  Patient is A&O x4 and follows 100% of one -step commands    Fine Motor Coordination   -       L hemiplegia     Postural Exam Patient presented with the following abnormalities:    -       Rounded shoulders  -       Forward head  -       Kyphosis  -       Posterior pelvic tilt  -       Weight shift posterior and R   Sensation    -       Light touch intact JUAN LE   Skin Integrity/Edema     -       Skin integrity: L residual limb dressed  -       Edema: L LE, L wrist, hand   R LE ROM WNL   R LE Strength 3+/5 hip flexion, 4/5 knee ext/flex, and ankle DF/PF   L LE ROM Hip ROM limited by pain   L LE Strength  3-/5 hip flexion, hip abd/adduction       Balance   Static Sitting stand by assistance    Dynamic Sitting contact guard assist    Static Standing minimum assistance HHA   Dynamic Standing       NA        Functional Mobility:    Bed Mobility  Rolling to R and L: minimum assistance with HR, cued for sequencing (patient tangled in lines)  Supine to Sit on the R side:  stand by assistance HOB elevated and use of HR, patient spontaneously attempting to return to supine before lines were organized in spite of therapist instruction to wait  Sit to supine: minimum assistance    Transfers Sit to Stand:  minimum assistance with R HHA    Stand pivot bed to bedside commode on R: minimum assistance    Squat pivot to bedside commode to bed on L: moderate assistance x2; patient initiating t/f before therapist was ready, patient's R foot not planted on the floor, patient reaching around therapist's neck for support, leaning towards L side, minimal functional use of L UE with t/f          AM-PAC 6 CLICK MOBILITY  Total Score:14       Treatment & Education:  Patient educated on:  -role of therapy  -goals of session  -PT POC  -benefits of out of  "bed mobility and consequences of immobility  -calling for staff assist to mobilize safely  Patient agreeable to mobilize with therapy.      Post-op AKA education:   -Post-op course and therapy progression  -Importance of positioning of residual limb to maintain ROM for eventual success with use of prosthesis: avoiding prolonged hip flexion, avoiding propping limb on pillows under thigh, avoiding prolonged elevation of HOB  -Hip extension stretching- encouraged low grade prolonged hip/knee extension stretching by flattening HOB and gentle overpressure through thigh  -HEP for strengthening/stretching JUAN LE  -Patient given print handout of HEP, and above education topics on positioning and stretching  -Patient verbalized understanding and agreement; continue to re-inforce AKA education     Performed supine and seated therex:   -hip extension stretching with HOB flat in supine, gentle overpressure 30" x2; cued to keep HOB flat for 15 min intervals for prolonged stretching 3-4x/day  -Supine bridging R LE, 10 reps, cued for full ROM  -Seated and supine L hip flexion, abd/adduction; 10 reps ea  -LAQ R LE, cued for full ROM and eccentric lowering, 10 reps    Performed stand pivot t/f to [facility] [place], patient educated on foot and hand placement, sequencing. PT changed bed linens. Patient unaware he had had BM, assisted with pericare, moderate assistance for partial stand with multidirectional instability, patient attempting to use R UE for support and pericare in spite of therapist cues to use UE for support and balance. Patient then spontaneously attempting to t/f from BSC to bed with poor R LE placement, R lean, sitting prematurely on bed.     Patient safe to transfer with RN assist x2 people, RN alerted to patient performance with therapy.     Patient left HOB elevated in spite of therapist encouragement to keep HOB flat for R hip flexor stretching with all lines intact, call button in reach, bed alarm on, and RN notified, PCA in " reach.    GOALS:   Multidisciplinary Problems       Physical Therapy Goals          Problem: Physical Therapy    Goal Priority Disciplines Outcome Goal Variances Interventions   Physical Therapy Goal     PT, PT/OT Ongoing, Progressing     Description: Goals to be met by: 1/10     Patient will increase functional independence with mobility by performin. Supine to sit with Modified Lily  2. Sit to supine with Modified Lily  3. Sit to stand transfer with Contact Guard Assistance  4. Bed to chair transfer with Stand-by Assistance using squat pivot technique.   5. Gait  x 3 feet with Moderate Assistance using LRAD.   6. Wheelchair propulsion x50 feet with contact guard Assistance using JUAN upper extremities and right leg.                          History:     Past Medical History:   Diagnosis Date    Abnormal PFT     Cardiac murmur     Cardiomegaly     CKD (chronic kidney disease)     Coronary artery disease     Depression     DVT (deep venous thrombosis)     left lower leg    Dyspnea     Edema     Episode of transient neurologic symptoms 2019    Essential hypertension 04/15/2014    GI bleed     Heart attack 2023    approxiamate date    High cholesterol     Hypertension     Mixed hyperlipidemia 04/15/2014    PVD (peripheral vascular disease)     Stroke 2023    aprroximate date    Thrombotic stroke involving right middle cerebral artery 2019       Past Surgical History:   Procedure Laterality Date    ABOVE-KNEE AMPUTATION Left 2023    Procedure: AMPUTATION, ABOVE KNEE;  Surgeon: NATALIE Mitchell II, MD;  Location: [facility] OR [address] [place] [address];  Service: Vascular;  Laterality: Left;    ANGIOGRAPHY OF LOWER EXTREMITY Left 2023    Procedure: Angiogram Extremity Unilateral;  Surgeon: Wes Cobian MD;  Location: [facility] OR [address] [place] [address];  Service: Vascular;  Laterality: Left;  125.58 mGy  22.7890 Gycm2  2.0 min  95 ml dye    AORTIC VALVE SURGERY       -    EXPLORATION OF FEMORAL  ARTERY Left 12/23/2023    Procedure: EXPLORATION, Posterior tibial artery;  Surgeon: Wes Cobian MD;  Location: [facility] OR [address] [place] [address];  Service: Vascular;  Laterality: Left;    PTA, ILIAC ARTERY  12/22/2023    Procedure: PTA, Iliac Artery;  Surgeon: NATALIE Mitchell II, MD;  Location: [facility] CATH LAB;  Service: Vascular;;    THROMBOLYSIS, PERIPHERAL BLOOD VESSEL Left 12/22/2023    Procedure: Thrombolysis-peripheral;  Surgeon: NATALIE Mitchell II, MD;  Location: [facility] CATH LAB;  Service: Vascular;  Laterality: Left;    VASCULAR SURGERY      left lower leg       Time Tracking:     PT Received On: 12/29/23  PT Start Time: 0933     PT Stop Time: 1014  PT Total Time (min): 41 min     Billable Minutes: Evaluation 10, Therapeutic Activity 15, and Therapeutic Exercise 16      12/29/2023

## 2023-12-29 NOTE — PT/OT/SLP EVAL
Occupational Therapy   Evaluation    Name: Jamari Huerta III  MRN: [id number]  Admitting Diagnosis: Thrombosis of femoro-popliteal bypass graft  Recent Surgery: Procedure(s) (LRB):  AMPUTATION, ABOVE KNEE (Left) 1 Day Post-Op    Recommendations:     Discharge Recommendations: Moderate Intensity Therapy  Discharge Equipment Recommendations:  wheelchair, bath bench  Barriers to discharge:  Decreased caregiver support    Assessment:   Co-TX with PT for pt safety and max participation    Jamari Huerta III is a 63 y.o. male with a medical diagnosis of Thrombosis of femoro-popliteal bypass graft.  He presents with pain in LLE (residual limb) s/p L AKA, in addition to residual L sided weakness 2/2 previous CVA. Performance deficits affecting function: weakness, impaired self care skills, impaired functional mobility, impaired endurance, gait instability, impaired cognition, decreased upper extremity function, decreased lower extremity function, decreased coordination, decreased safety awareness, pain, decreased ROM, impaired skin, edema, impaired cardiopulmonary response to activity, impaired fine motor, orthopedic precautions.  PTA, pt reports being Indp with ADLs and IADLs before onset of dx. Upon eval, pt requires assistance to manage opening and manipulating caps for bottles and other UE bilateral tasks. Pt is impulsive and requires max redirection for safety.     Rehab Prognosis: Good; patient would benefit from acute skilled OT services to address these deficits and reach maximum level of function.       Plan:     Patient to be seen 4 x/week to address the above listed problems via self-care/home management, therapeutic activities, therapeutic exercises, neuromuscular re-education  Plan of Care Expires: 01/28/24  Plan of Care Reviewed with: patient    Subjective     Chief Complaint: L LE  Patient/Family Comments/goals: return to plof    Occupational Profile:  Living Environment: Pt lives alone in an 1st floor apt with  leveled entry. Pt has walk-in shower with grab bars  Previous level of function: Indp  Roles and Routines: retired; no longer driving  Equipment Used at Home: cane, straight  Assistance upon Discharge:     Pain/Comfort:  Pain Rating 1: 10/10  Location - Side 1: Left  Location - Orientation 1: generalized  Location 1: leg  Pain Addressed 1: Reposition, Distraction  Pain Rating Post-Intervention 1: 7/10    Patients cultural, spiritual, Restorationism conflicts given the current situation: no    Objective:     Communicated with: Nsg prior to session.  Patient found supine with bed alarm, oxygen, peripheral IV, perineural catheter, marie catheter upon OT entry to room.    General Precautions: Standard, fall  Orthopedic Precautions: LLE non weight bearing  Braces: N/A  Respiratory Status: Nasal cannula, flow 2 L/min    Occupational Performance:    Bed Mobility:    Patient completed Rolling/Turning to Right with minimum assistance  Patient completed Scooting/Bridging with moderate assistance  Patient completed Supine to Sit with stand by assistance  Patient completed Sit to Supine with minimum assistance    Functional Mobility/Transfers:  Patient completed Sit <> Stand Transfer with minimum assistance  with  hand-held assist   Patient completed BSC > Bed Transfer using Squat Pivot technique with moderate assistance and of 2 persons with hand-held assist  Patient completed Bed >BSC Transfer Stand Pivot technique with minimum assistance with  hand-held assist    Activities of Daily Living:  Grooming: stand by assistance seated oral hygiene at bedside using mouthwash to rinse and spit into small basin  Toileting: stand by assistance using bedside commode    Cognitive/Visual Perceptual:  A&O x4 and simple 1 step command following    Physical Exam:  Upper Extremity Range of Motion:     -       Right Upper Extremity: WFL  -       Left Upper Extremity: Deficits: needs AAROM or PROM to move through full arc 2/2 to previous CVA  Upper  Extremity Strength:    -       Right Upper Extremity: WFL  -       Left Upper Extremity: Deficits: weakness 2/2 CVA   Strength:    -       Right Upper Extremity: WFL  -       Left Upper Extremity: Deficits: weakness 2/2 cva      AMPAC 6 Click ADL:  AMPAC Total Score: 17    Treatment & Education:  Pt educated on role and purpose of therapy  Pt educated on safety and command following  Pt educated on self advocacy and calling for assistance    Patient left supine with all lines intact, call button in reach, and nsg notified    GOALS:   Multidisciplinary Problems       Occupational Therapy Goals          Problem: Occupational Therapy    Goal Priority Disciplines Outcome Interventions   Occupational Therapy Goal     OT, PT/OT Ongoing, Progressing    Description: Goals to be met by: 1/28/2024     Patient will increase functional independence with ADLs by performing:    LE Dressing with Stand-by Assistance.  Grooming while standing at sink with Set-up Assistance and Supervision.  Toileting from toilet with Supervision for hygiene and clothing management.   Toilet transfer to toilet with Stand-by Assistance.                         History:     Past Medical History:   Diagnosis Date    Abnormal PFT     Cardiac murmur     Cardiomegaly     CKD (chronic kidney disease)     Coronary artery disease     Depression     DVT (deep venous thrombosis)     left lower leg    Dyspnea     Edema     Episode of transient neurologic symptoms 05/14/2019    Essential hypertension 04/15/2014    GI bleed     Heart attack 06/01/2023    approxiamate date    High cholesterol     Hypertension     Mixed hyperlipidemia 04/15/2014    PVD (peripheral vascular disease)     Stroke 06/01/2023    aprroximate date    Thrombotic stroke involving right middle cerebral artery 05/14/2019         Past Surgical History:   Procedure Laterality Date    ABOVE-KNEE AMPUTATION Left 12/28/2023    Procedure: AMPUTATION, ABOVE KNEE;  Surgeon: NATALIE Mitchell II,  MD;  Location: [address];  Service: Vascular;  Laterality: Left;    ANGIOGRAPHY OF LOWER EXTREMITY Left 12/23/2023    Procedure: Angiogram Extremity Unilateral;  Surgeon: Wes Cobian MD;  Location: [address];  Service: Vascular;  Laterality: Left;  125.58 mGy  22.7890 Gycm2  2.0 min  95 ml dye    AORTIC VALVE SURGERY      1982 -    EXPLORATION OF FEMORAL ARTERY Left 12/23/2023    Procedure: EXPLORATION, Posterior tibial artery;  Surgeon: Wes Cobian MD;  Location: [address];  Service: Vascular;  Laterality: Left;    PTA, ILIAC ARTERY  12/22/2023    Procedure: PTA, Iliac Artery;  Surgeon: NATALIE Mitchell II, MD;  Location: [facility] CATH LAB;  Service: Vascular;;    THROMBOLYSIS, PERIPHERAL BLOOD VESSEL Left 12/22/2023    Procedure: Thrombolysis-peripheral;  Surgeon: NATALIE Mitchell II, MD;  Location: [facility] CATH LAB;  Service: Vascular;  Laterality: Left;    VASCULAR SURGERY      left lower leg       Time Tracking:     OT Date of Treatment: 12/29/23  OT Start Time: 0933  OT Stop Time: 1014  OT Total Time (min): 41 min    Billable Minutes:Evaluation 10  Self Care/Home Management 31    12/29/2023

## 2023-12-29 NOTE — SUBJECTIVE & OBJECTIVE
Medications:  Continuous Infusions:   ROPIvacaine (PF) 2 mg/ml (0.2%) 0.1 mL/hr (12/28/23 1320)     Scheduled Meds:   acetaminophen  1,000 mg Oral TID    aspirin  81 mg Oral Daily    atorvastatin  80 mg Oral Daily    carvediloL  6.25 mg Oral BID WM    ceFAZolin (ANCEF) IVPB  2 g Intravenous Q8H    divalproex  125 mg Oral TID    heparin (porcine)  5,000 Units Subcutaneous Q8H    methocarbamoL  1,000 mg Oral Q6H    pantoprazole  40 mg Oral Daily    pregabalin  150 mg Oral QHS    QUEtiapine  25 mg Oral QHS     PRN Meds:hydrALAZINE, HYDROmorphone, hydrOXYzine, labetalol, melatonin, ondansetron, oxyCODONE **AND** oxyCODONE, promethazine, sodium chloride 0.9%     Objective:     Vital Signs (Most Recent):  Temp: 97.7 °F (36.5 °C) (12/29/23 0543)  Pulse: 81 (12/29/23 0543)  Resp: 18 (12/29/23 0543)  BP: (!) 159/70 (12/29/23 0645)  SpO2: (!) 94 % (12/29/23 0543) Vital Signs (24h Range):  Temp:  [97.5 °F (36.4 °C)-99.1 °F (37.3 °C)] 97.7 °F (36.5 °C)  Pulse:  [67-81] 81  Resp:  [13-23] 18  SpO2:  [90 %-100 %] 94 %  BP: (124-225)/(58-90) 159/70          Physical Exam  Constitutional:       Appearance: Normal appearance.   HENT:      Head: Normocephalic and atraumatic.      Nose: Nose normal.      Mouth/Throat:      Mouth: Mucous membranes are moist.      Pharynx: Oropharynx is clear.   Eyes:      Extraocular Movements: Extraocular movements intact.      Conjunctiva/sclera: Conjunctivae normal.   Cardiovascular:      Rate and Rhythm: Normal rate and regular rhythm.      Comments:     Pulmonary:      Effort: Pulmonary effort is normal.      Breath sounds: Normal breath sounds.   Abdominal:      General: Abdomen is flat.      Palpations: Abdomen is soft.   Musculoskeletal:         General: Normal range of motion.      Comments: L AKA   Skin:     General: Skin is warm and dry.      Capillary Refill: Capillary refill takes less than 2 seconds.      Comments: L AKA wrapped in surgical dressing, no strike through    Neurological:       General: No focal deficit present.      Mental Status: He is alert.          Significant Labs:  CBC:   Recent Labs   Lab 12/28/23  1337   WBC 10.96   RBC 4.15*   HGB 12.6*   HCT 37.3*   *   MCV 90   MCH 30.4   MCHC 33.8     CMP:   Recent Labs   Lab 12/29/23  0544   *   CALCIUM 8.3*   ALBUMIN 2.4*   PROT 5.5*      K 4.4   CO2 27      BUN 23   CREATININE 0.8   ALKPHOS 93   ALT 18   AST 40   BILITOT 0.4       Significant Diagnostics:  I have reviewed all pertinent imaging results/findings within the past 24 hours.

## 2023-12-29 NOTE — ASSESSMENT & PLAN NOTE
63 M with long history of PAD, s/p SFA to above the knee popliteal artery bypass, current smoker, presents as a transfer due to concern for decreased sensation with concern for rest pain secondary to occluded bypass graft now s/p angiogram, thrombolysis, and posterior tibial artery exploration. Unfortunately, no good options for revascularization of this patients left leg. After long discussion with patient this morning he is amenable to that surgery.     - OBI LUA 12/28  - continue aspirin, statin  - Ropivacaine in pnc per anesthesia   - MM pain control   - keep dressing clean and dry, will take down dressing tomorrow     Dispo:  Patient will need SNF early next week, appreciate SW assistance

## 2023-12-29 NOTE — PLAN OF CARE
[facility]  Discharge Reassessment    Primary Care Provider: Nataliya Anderson MD    Expected Discharge Date: 1/3/2024    Reassessment (most recent)       Discharge Reassessment - 12/29/23 1544          Discharge Reassessment    Assessment Type Discharge Planning Reassessment     Did the patient's condition or plan change since previous assessment? No     Discharge Plan discussed with: Patient     Communicated MYLENE with patient/caregiver Yes     Discharge Plan A Skilled Nursing Facility     Discharge Plan B Skilled Nursing Facility     Transition of Care Barriers None     Why the patient remains in the hospital Requires continued medical care                   Spoke with patient. Patient would like SNF in [place] or [place].

## 2023-12-29 NOTE — PLAN OF CARE
Problem: Occupational Therapy  Goal: Occupational Therapy Goal  Description: Goals to be met by: 1/28/2024     Patient will increase functional independence with ADLs by performing:    LE Dressing with Stand-by Assistance.  Grooming while standing at sink with Set-up Assistance and Supervision.  Toileting from toilet with Supervision for hygiene and clothing management.   Toilet transfer to toilet with Stand-by Assistance.    Outcome: Ongoing, Progressing

## 2023-12-29 NOTE — PLAN OF CARE
[facility] Plan of Care Note  Dx thrombosis of fem-pop/right AKA    Shift Events patient ambulated to bedside toilet and moved bowels but was very unsteady and impulsive according to physical therapy    Goals of Care: ambulate without falling, control pain, discharge to SNF when bed available    Neuro: WDL    Vital Signs: WDL    Respiratory: WDL    Diet: regular    Is patient tolerating current diet? yes    GTTS: ropivacaine continuously    Urine Output/Bowel Movement: usually continent of bowel and bladder    Drains/Tubes/Tube Feeds (include total output/shift): no    Lines: PIV      Accuchecks:no    Skin: see flowchart    Fall Risk Score: 16    Activity level? Oob with 2 person assist    Any scheduled procedures? PT/OT    Any safety concerns? Fall risk, precautions already taken. Bed alarm activated    Other: N/A

## 2023-12-29 NOTE — PLAN OF CARE
Problem: Physical Therapy  Goal: Physical Therapy Goal  Description: Goals to be met by: 1/10     Patient will increase functional independence with mobility by performin. Supine to sit with Modified [place]  2. Sit to supine with Modified [place]  3. Sit to stand transfer with Contact Guard Assistance  4. Bed to chair transfer with Stand-by Assistance using squat pivot technique.   5. Gait  x 3 feet with Moderate Assistance using LRAD.   6. Wheelchair propulsion x50 feet with contact guard Assistance using JUAN upper extremities and right leg.     Outcome: Ongoing, Progressing   Evaluation completed, initiated plan of care.   Kari Lassiter, PT  2023

## 2023-12-29 NOTE — PROGRESS NOTES
[facility]  Vascular Surgery  Progress Note    Patient Name: Jamari Huerta III  MRN: [id number]  Admission Date: 12/21/2023  Primary Care Provider: Nataliya Anderson MD    Subjective:     Interval History: Patient sleeping on rounds, pain well controlled, arouses easily.  Surgical dressing in place to LUE, c/d/I.  Pain cath in place.  Will take down dressing tomorrow.     Post-Op Info:  Procedure(s) (LRB):  AMPUTATION, ABOVE KNEE (Left)   1 Day Post-Op     Medications:  Continuous Infusions:   ROPIvacaine (PF) 2 mg/ml (0.2%) 0.1 mL/hr (12/28/23 1320)     Scheduled Meds:   acetaminophen  1,000 mg Oral TID    aspirin  81 mg Oral Daily    atorvastatin  80 mg Oral Daily    carvediloL  6.25 mg Oral BID WM    ceFAZolin (ANCEF) IVPB  2 g Intravenous Q8H    divalproex  125 mg Oral TID    heparin (porcine)  5,000 Units Subcutaneous Q8H    methocarbamoL  1,000 mg Oral Q6H    pantoprazole  40 mg Oral Daily    pregabalin  150 mg Oral QHS    QUEtiapine  25 mg Oral QHS     PRN Meds:hydrALAZINE, HYDROmorphone, hydrOXYzine, labetalol, melatonin, ondansetron, oxyCODONE **AND** oxyCODONE, promethazine, sodium chloride 0.9%     Objective:     Vital Signs (Most Recent):  Temp: 97.7 °F (36.5 °C) (12/29/23 0543)  Pulse: 81 (12/29/23 0543)  Resp: 18 (12/29/23 0543)  BP: (!) 159/70 (12/29/23 0645)  SpO2: (!) 94 % (12/29/23 0543) Vital Signs (24h Range):  Temp:  [97.5 °F (36.4 °C)-99.1 °F (37.3 °C)] 97.7 °F (36.5 °C)  Pulse:  [67-81] 81  Resp:  [13-23] 18  SpO2:  [90 %-100 %] 94 %  BP: (124-225)/(58-90) 159/70          Physical Exam  Constitutional:       Appearance: Normal appearance.   HENT:      Head: Normocephalic and atraumatic.      Nose: Nose normal.      Mouth/Throat:      Mouth: Mucous membranes are moist.      Pharynx: Oropharynx is clear.   Eyes:      Extraocular Movements: Extraocular movements intact.      Conjunctiva/sclera: Conjunctivae normal.   Cardiovascular:      Rate and Rhythm: Normal rate and regular rhythm.       Comments:     Pulmonary:      Effort: Pulmonary effort is normal.      Breath sounds: Normal breath sounds.   Abdominal:      General: Abdomen is flat.      Palpations: Abdomen is soft.   Musculoskeletal:         General: Normal range of motion.      Comments: L AKA   Skin:     General: Skin is warm and dry.      Capillary Refill: Capillary refill takes less than 2 seconds.      Comments: OBI LUA wrapped in surgical dressing, no strike through    Neurological:      General: No focal deficit present.      Mental Status: He is alert.          Significant Labs:  CBC:   Recent Labs   Lab 12/28/23  1337   WBC 10.96   RBC 4.15*   HGB 12.6*   HCT 37.3*   *   MCV 90   MCH 30.4   MCHC 33.8     CMP:   Recent Labs   Lab 12/29/23  0544   *   CALCIUM 8.3*   ALBUMIN 2.4*   PROT 5.5*      K 4.4   CO2 27      BUN 23   CREATININE 0.8   ALKPHOS 93   ALT 18   AST 40   BILITOT 0.4       Significant Diagnostics:  I have reviewed all pertinent imaging results/findings within the past 24 hours.  Assessment/Plan:     * Thrombosis of femoro-popliteal bypass graft  63 M with long history of PAD, s/p SFA to above the knee popliteal artery bypass, current smoker, presents as a transfer due to concern for decreased sensation with concern for rest pain secondary to occluded bypass graft now s/p angiogram, thrombolysis, and posterior tibial artery exploration. Unfortunately, no good options for revascularization of this patients left leg. After long discussion with patient this morning he is amenable to that surgery.     - OBI LUA 12/28  - continue aspirin, statin  - Ropivacaine in pnc per anesthesia   - MM pain control   - keep dressing clean and dry, will take down dressing tomorrow     Dispo:  Patient will need SNF early next week, appreciate SW assistance         Sarah Condon NP  Vascular Surgery  [facility]

## 2023-12-30 LAB
ALBUMIN SERPL BCP-MCNC: 2.4 G/DL (ref 3.5–5.2)
ALP SERPL-CCNC: 114 U/L (ref 55–135)
ALT SERPL W/O P-5'-P-CCNC: 42 U/L (ref 10–44)
ANION GAP SERPL CALC-SCNC: 7 MMOL/L (ref 8–16)
AST SERPL-CCNC: 72 U/L (ref 10–40)
BASOPHILS # BLD AUTO: 0.06 K/UL (ref 0–0.2)
BASOPHILS NFR BLD: 0.6 % (ref 0–1.9)
BILIRUB SERPL-MCNC: 0.4 MG/DL (ref 0.1–1)
BUN SERPL-MCNC: 24 MG/DL (ref 8–23)
CALCIUM SERPL-MCNC: 8.2 MG/DL (ref 8.7–10.5)
CHLORIDE SERPL-SCNC: 106 MMOL/L (ref 95–110)
CO2 SERPL-SCNC: 23 MMOL/L (ref 23–29)
CREAT SERPL-MCNC: 0.7 MG/DL (ref 0.5–1.4)
DIFFERENTIAL METHOD BLD: ABNORMAL
EOSINOPHIL # BLD AUTO: 0.1 K/UL (ref 0–0.5)
EOSINOPHIL NFR BLD: 1.4 % (ref 0–8)
ERYTHROCYTE [DISTWIDTH] IN BLOOD BY AUTOMATED COUNT: 15.9 % (ref 11.5–14.5)
EST. GFR  (NO RACE VARIABLE): >60 ML/MIN/1.73 M^2
GLUCOSE SERPL-MCNC: 142 MG/DL (ref 70–110)
HCT VFR BLD AUTO: 31.8 % (ref 40–54)
HGB BLD-MCNC: 10.2 G/DL (ref 14–18)
IMM GRANULOCYTES # BLD AUTO: 0.18 K/UL (ref 0–0.04)
IMM GRANULOCYTES NFR BLD AUTO: 1.9 % (ref 0–0.5)
LYMPHOCYTES # BLD AUTO: 0.8 K/UL (ref 1–4.8)
LYMPHOCYTES NFR BLD: 8 % (ref 18–48)
MAGNESIUM SERPL-MCNC: 1.8 MG/DL (ref 1.6–2.6)
MCH RBC QN AUTO: 30.6 PG (ref 27–31)
MCHC RBC AUTO-ENTMCNC: 32.1 G/DL (ref 32–36)
MCV RBC AUTO: 96 FL (ref 82–98)
MONOCYTES # BLD AUTO: 1.3 K/UL (ref 0.3–1)
MONOCYTES NFR BLD: 13.3 % (ref 4–15)
NEUTROPHILS # BLD AUTO: 7.3 K/UL (ref 1.8–7.7)
NEUTROPHILS NFR BLD: 74.8 % (ref 38–73)
NRBC BLD-RTO: 0 /100 WBC
PHOSPHATE SERPL-MCNC: 1.9 MG/DL (ref 2.7–4.5)
PLATELET # BLD AUTO: 150 K/UL (ref 150–450)
PMV BLD AUTO: 10.9 FL (ref 9.2–12.9)
POTASSIUM SERPL-SCNC: 4.3 MMOL/L (ref 3.5–5.1)
PROT SERPL-MCNC: 5.8 G/DL (ref 6–8.4)
RBC # BLD AUTO: 3.33 M/UL (ref 4.6–6.2)
SODIUM SERPL-SCNC: 136 MMOL/L (ref 136–145)
WBC # BLD AUTO: 9.72 K/UL (ref 3.9–12.7)

## 2023-12-30 PROCEDURE — 25000003 PHARM REV CODE 250: Performed by: STUDENT IN AN ORGANIZED HEALTH CARE EDUCATION/TRAINING PROGRAM

## 2023-12-30 PROCEDURE — 25000003 PHARM REV CODE 250

## 2023-12-30 PROCEDURE — 63600175 PHARM REV CODE 636 W HCPCS: Performed by: STUDENT IN AN ORGANIZED HEALTH CARE EDUCATION/TRAINING PROGRAM

## 2023-12-30 PROCEDURE — 20600001 HC STEP DOWN PRIVATE ROOM

## 2023-12-30 PROCEDURE — 83735 ASSAY OF MAGNESIUM: CPT

## 2023-12-30 PROCEDURE — 85025 COMPLETE CBC W/AUTO DIFF WBC: CPT | Performed by: STUDENT IN AN ORGANIZED HEALTH CARE EDUCATION/TRAINING PROGRAM

## 2023-12-30 PROCEDURE — 80053 COMPREHEN METABOLIC PANEL: CPT | Performed by: STUDENT IN AN ORGANIZED HEALTH CARE EDUCATION/TRAINING PROGRAM

## 2023-12-30 PROCEDURE — 84100 ASSAY OF PHOSPHORUS: CPT

## 2023-12-30 RX ORDER — PREGABALIN 50 MG/1
100 CAPSULE ORAL EVERY 8 HOURS
Status: DISCONTINUED | OUTPATIENT
Start: 2023-12-30 | End: 2024-01-05 | Stop reason: HOSPADM

## 2023-12-30 RX ADMIN — METHOCARBAMOL 1000 MG: 500 TABLET ORAL at 11:12

## 2023-12-30 RX ADMIN — PANTOPRAZOLE SODIUM 40 MG: 40 TABLET, DELAYED RELEASE ORAL at 05:12

## 2023-12-30 RX ADMIN — POTASSIUM PHOSPHATE, MONOBASIC POTASSIUM PHOSPHATE, DIBASIC 20 MMOL: 224; 236 INJECTION, SOLUTION, CONCENTRATE INTRAVENOUS at 09:12

## 2023-12-30 RX ADMIN — CEFAZOLIN 2 G: 2 INJECTION, POWDER, FOR SOLUTION INTRAMUSCULAR; INTRAVENOUS at 11:12

## 2023-12-30 RX ADMIN — OXYCODONE HYDROCHLORIDE 5 MG: 5 TABLET ORAL at 04:12

## 2023-12-30 RX ADMIN — DIVALPROEX SODIUM 125 MG: 125 TABLET, DELAYED RELEASE ORAL at 08:12

## 2023-12-30 RX ADMIN — HEPARIN SODIUM 5000 UNITS: 5000 INJECTION INTRAVENOUS; SUBCUTANEOUS at 06:12

## 2023-12-30 RX ADMIN — ROPIVACAINE HYDROCHLORIDE 0.1 ML/HR: 2 INJECTION, SOLUTION EPIDURAL; INFILTRATION at 02:12

## 2023-12-30 RX ADMIN — CEFAZOLIN 2 G: 2 INJECTION, POWDER, FOR SOLUTION INTRAMUSCULAR; INTRAVENOUS at 03:12

## 2023-12-30 RX ADMIN — DIVALPROEX SODIUM 125 MG: 125 TABLET, DELAYED RELEASE ORAL at 03:12

## 2023-12-30 RX ADMIN — HEPARIN SODIUM 5000 UNITS: 5000 INJECTION INTRAVENOUS; SUBCUTANEOUS at 09:12

## 2023-12-30 RX ADMIN — METHOCARBAMOL 1000 MG: 500 TABLET ORAL at 06:12

## 2023-12-30 RX ADMIN — ACETAMINOPHEN 1000 MG: 325 TABLET ORAL at 08:12

## 2023-12-30 RX ADMIN — METHOCARBAMOL 1000 MG: 500 TABLET ORAL at 05:12

## 2023-12-30 RX ADMIN — HYDRALAZINE HYDROCHLORIDE 10 MG: 20 INJECTION, SOLUTION INTRAMUSCULAR; INTRAVENOUS at 04:12

## 2023-12-30 RX ADMIN — PREGABALIN 100 MG: 50 CAPSULE ORAL at 09:12

## 2023-12-30 RX ADMIN — ONDANSETRON 8 MG: 8 TABLET, ORALLY DISINTEGRATING ORAL at 09:12

## 2023-12-30 RX ADMIN — CARVEDILOL 6.25 MG: 6.25 TABLET, FILM COATED ORAL at 06:12

## 2023-12-30 RX ADMIN — PREGABALIN 100 MG: 50 CAPSULE ORAL at 02:12

## 2023-12-30 RX ADMIN — HEPARIN SODIUM 5000 UNITS: 5000 INJECTION INTRAVENOUS; SUBCUTANEOUS at 02:12

## 2023-12-30 RX ADMIN — DIVALPROEX SODIUM 125 MG: 125 TABLET, DELAYED RELEASE ORAL at 09:12

## 2023-12-30 RX ADMIN — ACETAMINOPHEN 1000 MG: 325 TABLET ORAL at 03:12

## 2023-12-30 RX ADMIN — ACETAMINOPHEN 1000 MG: 325 TABLET ORAL at 09:12

## 2023-12-30 RX ADMIN — QUETIAPINE FUMARATE 25 MG: 25 TABLET ORAL at 09:12

## 2023-12-30 RX ADMIN — ATORVASTATIN CALCIUM 80 MG: 40 TABLET, FILM COATED ORAL at 08:12

## 2023-12-30 RX ADMIN — ASPIRIN 81 MG CHEWABLE TABLET 81 MG: 81 TABLET CHEWABLE at 08:12

## 2023-12-30 RX ADMIN — CEFAZOLIN 2 G: 2 INJECTION, POWDER, FOR SOLUTION INTRAMUSCULAR; INTRAVENOUS at 06:12

## 2023-12-30 RX ADMIN — POTASSIUM PHOSPHATE, MONOBASIC POTASSIUM PHOSPHATE, DIBASIC 20 MMOL: 224; 236 INJECTION, SOLUTION, CONCENTRATE INTRAVENOUS at 02:12

## 2023-12-30 RX ADMIN — CARVEDILOL 6.25 MG: 6.25 TABLET, FILM COATED ORAL at 05:12

## 2023-12-30 NOTE — SUBJECTIVE & OBJECTIVE
Medications:  Continuous Infusions:   ROPIvacaine (PF) 2 mg/ml (0.2%) 0.1 mL/hr (12/30/23 0231)     Scheduled Meds:   acetaminophen  1,000 mg Oral TID    aspirin  81 mg Oral Daily    atorvastatin  80 mg Oral Daily    carvediloL  6.25 mg Oral BID WM    ceFAZolin (ANCEF) IVPB  2 g Intravenous Q8H    divalproex  125 mg Oral TID    heparin (porcine)  5,000 Units Subcutaneous Q8H    methocarbamoL  1,000 mg Oral Q6H    pantoprazole  40 mg Oral Daily    potassium phosphate IVPB  20 mmol Intravenous Q4H    pregabalin  150 mg Oral QHS    QUEtiapine  25 mg Oral QHS     PRN Meds:hydrALAZINE, hydrOXYzine, labetalol, melatonin, ondansetron, oxyCODONE **AND** [DISCONTINUED] oxyCODONE, promethazine, sodium chloride 0.9%     Objective:     Vital Signs (Most Recent):  Temp: 98.7 °F (37.1 °C) (12/30/23 0735)  Pulse: 81 (12/30/23 0735)  Resp: 18 (12/30/23 0735)  BP: (!) 146/72 (12/30/23 0735)  SpO2: 98 % (12/30/23 0735) Vital Signs (24h Range):  Temp:  [98.1 °F (36.7 °C)-99.2 °F (37.3 °C)] 98.7 °F (37.1 °C)  Pulse:  [77-83] 81  Resp:  [17-20] 18  SpO2:  [94 %-98 %] 98 %  BP: (137-189)/(63-83) 146/72          Physical Exam  Vitals reviewed.   Constitutional:       Appearance: Normal appearance.   Cardiovascular:      Rate and Rhythm: Normal rate and regular rhythm.   Pulmonary:      Effort: Pulmonary effort is normal. No respiratory distress.   Abdominal:      Palpations: Abdomen is soft.      Tenderness: There is no abdominal tenderness.   Musculoskeletal:      Comments: S/p left AKA. Incision healing appropriately. Re dressed with gauze and ace wrap.    Skin:     General: Skin is warm.   Neurological:      General: No focal deficit present.      Mental Status: He is alert and oriented to person, place, and time.          Significant Labs:  CBC:   Recent Labs   Lab 12/30/23  0549   WBC 9.72   RBC 3.33*   HGB 10.2*   HCT 31.8*      MCV 96   MCH 30.6   MCHC 32.1     CMP:   Recent Labs   Lab 12/30/23  0549   *   CALCIUM  8.2*   ALBUMIN 2.4*   PROT 5.8*      K 4.3   CO2 23      BUN 24*   CREATININE 0.7   ALKPHOS 114   ALT 42   AST 72*   BILITOT 0.4

## 2023-12-30 NOTE — ANESTHESIA POSTPROCEDURE EVALUATION
Anesthesia Post Evaluation    Patient: Jamari Huerta III    Procedure(s) Performed: Procedure(s) (LRB):  AMPUTATION, ABOVE KNEE (Left)    Final Anesthesia Type: general      Patient location during evaluation: PACU  Patient participation: Yes- Able to Participate  Level of consciousness: awake and alert  Post-procedure vital signs: reviewed and stable  Pain management: adequate  Airway patency: patent    PONV status at discharge: No PONV  Anesthetic complications: no      Cardiovascular status: blood pressure returned to baseline  Respiratory status: unassisted  Hydration status: euvolemic  Follow-up not needed.              Vitals Value Taken Time   /72 12/30/23 0735   Temp 37.1 °C (98.7 °F) 12/30/23 0735   Pulse 81 12/30/23 0735   Resp 18 12/30/23 0735   SpO2 98 % 12/30/23 0735         Event Time   Out of Recovery 12/28/2023 14:00:00         Pain/Yari Score: Pain Rating Prior to Med Admin: 10 (12/30/2023  8:54 AM)  Pain Rating Post Med Admin: 0 (12/30/2023  9:54 AM)  Yari Score: 10 (12/29/2023  8:00 PM)

## 2023-12-30 NOTE — PROGRESS NOTES
[facility]  Vascular Surgery  Progress Note    Patient Name: Jamari Huerta III  MRN: [id number]  Admission Date: 12/21/2023  Primary Care Provider: Nataliya Anderson MD    Subjective:     Interval History: NAEON. Feeling well this morning. Pain well controlled with pnc. AVSS. Incision healing well. Low phos, otherwise labs stable.    Post-Op Info:  Procedure(s) (LRB):  AMPUTATION, ABOVE KNEE (Left)   2 Days Post-Op     Medications:  Continuous Infusions:   ROPIvacaine (PF) 2 mg/ml (0.2%) 0.1 mL/hr (12/30/23 0231)     Scheduled Meds:   acetaminophen  1,000 mg Oral TID    aspirin  81 mg Oral Daily    atorvastatin  80 mg Oral Daily    carvediloL  6.25 mg Oral BID WM    ceFAZolin (ANCEF) IVPB  2 g Intravenous Q8H    divalproex  125 mg Oral TID    heparin (porcine)  5,000 Units Subcutaneous Q8H    methocarbamoL  1,000 mg Oral Q6H    pantoprazole  40 mg Oral Daily    potassium phosphate IVPB  20 mmol Intravenous Q4H    pregabalin  150 mg Oral QHS    QUEtiapine  25 mg Oral QHS     PRN Meds:hydrALAZINE, hydrOXYzine, labetalol, melatonin, ondansetron, oxyCODONE **AND** [DISCONTINUED] oxyCODONE, promethazine, sodium chloride 0.9%     Objective:     Vital Signs (Most Recent):  Temp: 98.7 °F (37.1 °C) (12/30/23 0735)  Pulse: 81 (12/30/23 0735)  Resp: 18 (12/30/23 0735)  BP: (!) 146/72 (12/30/23 0735)  SpO2: 98 % (12/30/23 0735) Vital Signs (24h Range):  Temp:  [98.1 °F (36.7 °C)-99.2 °F (37.3 °C)] 98.7 °F (37.1 °C)  Pulse:  [77-83] 81  Resp:  [17-20] 18  SpO2:  [94 %-98 %] 98 %  BP: (137-189)/(63-83) 146/72          Physical Exam  Vitals reviewed.   Constitutional:       Appearance: Normal appearance.   Cardiovascular:      Rate and Rhythm: Normal rate and regular rhythm.   Pulmonary:      Effort: Pulmonary effort is normal. No respiratory distress.   Abdominal:      Palpations: Abdomen is soft.      Tenderness: There is no abdominal tenderness.   Musculoskeletal:      Comments: S/p left AKA. Incision healing appropriately.  Re dressed with gauze and ace wrap.    Skin:     General: Skin is warm.   Neurological:      General: No focal deficit present.      Mental Status: He is alert and oriented to person, place, and time.          Significant Labs:  CBC:   Recent Labs   Lab 12/30/23  0549   WBC 9.72   RBC 3.33*   HGB 10.2*   HCT 31.8*      MCV 96   MCH 30.6   MCHC 32.1     CMP:   Recent Labs   Lab 12/30/23  0549   *   CALCIUM 8.2*   ALBUMIN 2.4*   PROT 5.8*      K 4.3   CO2 23      BUN 24*   CREATININE 0.7   ALKPHOS 114   ALT 42   AST 72*   BILITOT 0.4       Assessment/Plan:     * Thrombosis of femoro-popliteal bypass graft  63 M with long history of PAD, s/p SFA to above the knee popliteal artery bypass, current smoker, presents as a transfer due to concern for decreased sensation with concern for rest pain secondary to occluded bypass graft now s/p angiogram, thrombolysis, and posterior tibial artery exploration. Unfortunately, no good options for revascularization of this patients left leg. After long discussion with patient this morning he is amenable to that surgery. Now s/p left AKA on 12/28.     - Replace phos  - L AKA stump healing appropriately  - continue aspirin, statin  - Ropivacaine in pnc per anesthesia   - MM pain control   - Replace dressing as needed  - Stump protector    Dispo: Patient will need SNF early next week, appreciate SW assistance         Amador Mccarty MD  Vascular Surgery  [facility]

## 2023-12-30 NOTE — OP NOTE
OPERATIVE REPORT    Patient: Jamari Huerta III  Surgeon(s) and Role:     * NATALIE Mitchell II, MD - Primary     * Amador Mccarty MD - Resident - Assisting     * Ludin Chun MD - Resident - Assisting     * Jomar Ratliff MD - Resident - Assisting           Pre-op Diagnosis:  Thrombosis of femoro-popliteal bypass graft, initial encounter [T82.868A]  Critical lower limb ischemia [I70.229]  PVD (peripheral vascular disease) [I73.9]     Post-op Diagnosis:  Post-Op Diagnosis Codes:     * Thrombosis of femoro-popliteal bypass graft, initial encounter [T82.868A]     * Critical lower limb ischemia [I70.229]     * PVD (peripheral vascular disease) [I73.9]     Procedure(s) (LRB):  AMPUTATION, ABOVE KNEE (Left)     Anesthesia: General/MAC      Indications for Procedure:  Jamari Huerta III is a 63 y.o. male with a history of a left femoral to popliteal artery bypass with graft who had presented with an occluded bypass graft.  He underwent attempted catheter-directed thrombolysis which was unable to be performed due to significant clot burden and likely chronicity and disadvantaged outflow. Cut down on left posterior tibial artery was performed and the artery was too diminutive for bypass target to revasc his left leg.  He had continued rest pain.  He was offered a left above-knee amputation. All risks, benefits, and alternatives were discussed and the patient understood and wished to proceed and gave written consent.     Operative Findings: L AKA performed in the standard fashion.      Procedure in Detail:  After informed consent was obtained the anesthesia team administered a  left lower extremity regional block in the pre-operative area. The patient tolerated this well and was taken to the OR in supine position. Appropriate hemodynamic monitors were put in place and the anesthesia team provided moderate sedation. The patient's  left leg was prepped and draped circumferentially in sterile fashion. An impervious  sterile stockinet was placed over the patient's foot and lower leg. Pre-operative antibiotics were administered. A time out was performed confirming the appropriate patient identification, procedure, site/laterality, and administration of pre-op antibiotics. Using a skin marker, we marked our planned [place] incision on the patient's left thigh. A tourniquet was inflated initially in the upper thigh. We began by incising the skin along the anterior thigh using a 10-blade. The incision was deepened and extended through the fascia and thigh extensor muscles with electrocautery. The femoral vessels were identified and exposed circumferentially. The superficial femoral artery and thrombosed bypass was suture ligated proximally and distally with silk ties. The femoral vein was ligated proximally and distally with 2-0 silk ties. The remaining anterior and lateral muscles were divided with electrocautery and the femur was exposed circumferentially. A georgie-ostial elevator and dry lap pad were used to retract the tissue surrounding the bone superiorly. A lap pad was passed behind the femur and brought superiorly to protect the posterior tissues. A power saw was used to divide the femur. Next the distal femur was retracted with a bone hook and the remaining posterior muscle, fascia, and skin was amputated free with an amputation knife. We then released the tourniquet on the upper thigh. Bleeding vessels in the posterior flap were controlled with silk-sutures. Other bleeding was controlled with electrocautery. Satisfied with our hemostasis we then thoroughly irrigated with antibiotic irrigation. A 2-0 vicryl suture was used to purse-string the deep muscle around the tip of the femur. Next the fascia was closed with interrupted 2-0 vicryl sutures. Finally the skin was closed with interrupted vertical mattress 3-0 nylons and staples. The incision was dressed with xeroform gauze, 4x4 gauze, kerlix, and ACE wrap. All sponge,  needle, and instrument counts were correct at the end of the case. The patient tolerated the procedure well and was transported to PACU for recovery.    Estimated blood loss: 75ml    Complications: none    NATALIE Mitchell II, MD, VI  Vascular Surgery  [facility]

## 2023-12-30 NOTE — PLAN OF CARE
[facility] Plan of Care Note  Dx Thrombosis of femoro-popliteal bypass graft    Shift Events     Goals of Care: Pain & nausea control,     Neuro: A & O x 4    Vital Signs: WDL     Respiratory: WDL    Diet: Reg    Is patient tolerating current diet? yes    GTTS: none    Urine Output/Bowel Movement: Pt uses urinal, but spills in bed /    Drains/Tubes/Tube Feeds (include total output/shift): none    Lines: 20g LFA    Accuchecks:none    Skin: WDL other than LAKA    Fall Risk Score: 16    Activity level? X 2 assist    Any scheduled procedures? none    Any safety concerns? none    Other: none

## 2023-12-30 NOTE — ASSESSMENT & PLAN NOTE
63 M with long history of PAD, s/p SFA to above the knee popliteal artery bypass, current smoker, presents as a transfer due to concern for decreased sensation with concern for rest pain secondary to occluded bypass graft now s/p angiogram, thrombolysis, and posterior tibial artery exploration. Unfortunately, no good options for revascularization of this patients left leg. After long discussion with patient this morning he is amenable to that surgery. Now s/p left AKA on 12/28.     - Replace phos  - L AKA stump healing appropriately  - continue aspirin, statin  - Ropivacaine in pnc per anesthesia   - MM pain control   - Replace dressing as needed  - Stump protector    Dispo: Patient will need SNF early next week, appreciate SW assistance

## 2023-12-30 NOTE — CARE UPDATE
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     Surgery:  Procedure(s) (LRB):  AMPUTATION, ABOVE KNEE (Left)    Post Op Day #: 2    Catheter type: Perineural Adductor Canal    Infusion type: Ropivacaine 0.2%  8 ml/hr basal    Problem List:    Active Hospital Problems    Diagnosis  POA    *Thrombosis of femoro-popliteal bypass graft [T82.868A]  Yes     SFA to above knee pop bypass        Resolved Hospital Problems   No resolved problems to display.       Subjective:     General appearance of alert, oriented, no complaints   Pain with rest: 3    Numbers   Pain with movement: 7    Numbers   Side Effects    1. Pruritis No    2. Nausea Yes    3. Motor Blockade No, 0=Ability to raise lower extremities off bed    4. Sedation No, 1=awake and alert    Schedule Medications:    acetaminophen  1,000 mg Oral TID    aspirin  81 mg Oral Daily    atorvastatin  80 mg Oral Daily    carvediloL  6.25 mg Oral BID WM    ceFAZolin (ANCEF) IVPB  2 g Intravenous Q8H    divalproex  125 mg Oral TID    heparin (porcine)  5,000 Units Subcutaneous Q8H    methocarbamoL  1,000 mg Oral Q6H    pantoprazole  40 mg Oral Daily    potassium phosphate IVPB  20 mmol Intravenous Q4H    pregabalin  100 mg Oral Q8H    QUEtiapine  25 mg Oral QHS        Continuous Infusions:   ROPIvacaine (PF) 2 mg/ml (0.2%) 0.1 mL/hr (12/30/23 0231)        PRN Medications:  hydrALAZINE, hydrOXYzine, labetalol, melatonin, ondansetron, oxyCODONE **AND** [DISCONTINUED] oxyCODONE, promethazine, sodium chloride 0.9%       Antibiotics:  Antibiotics (From admission, onward)      Start     Stop Route Frequency Ordered    12/22/23 1500  ceFAZolin 2 g in dextrose 5 % in water (D5W) 50 mL IVPB (MB+)         -- IV Every 8 hours (non-standard times)  12/22/23 1230             Objective:     Catheter site clean, dry, intact    Vital Signs (Most Recent):  Temp: 37.1 °C (98.7 °F) (12/30/23 0735)  Pulse: 81 (12/30/23 0735)  Resp: 18 (12/30/23 0735)  BP: (!) 146/72 (12/30/23 0735)  SpO2: 98 % (12/30/23 0735) Vital Signs Range (Last 24H):  Temp:  [36.7 °C (98.1 °F)-37.3 °C (99.2 °F)]   Pulse:  [77-83]   Resp:  [17-20]   BP: (137-189)/(63-83)   SpO2:  [94 %-98 %]      I & O (Last 24H):  Intake/Output Summary (Last 24 hours) at 12/30/2023 1015  Last data filed at 12/30/2023 0512  Gross per 24 hour   Intake 1400 ml   Output 2025 ml   Net -625 ml       Physical Exam:    GA: Alert, comfortable, no acute distress.   Pulmonary: Clear to auscultation A/P/L. No wheezing, crackles, or rhonchi.  Cardiac: RRR S1 & S2 w/o rubs/murmurs/gallops.   Abdominal:Bowel sounds present. No tenderness to palpation or distension. No appreciable hepatosplenomegaly.   Skin: No jaundice, rashes, or visible lesions.    Laboratory:  CBC:   Recent Labs     12/29/23  0544 12/30/23  0549   WBC 9.48 9.72   RBC 3.52* 3.33*   HGB 11.0* 10.2*   HCT 33.5* 31.8*   * 150   MCV 95 96   MCH 31.3* 30.6   MCHC 32.8 32.1       BMP:   Recent Labs     12/29/23  0544 12/29/23  0545 12/30/23  0549     --  136   K 4.4  --  4.3   CO2 27  --  23     --  106   BUN 23  --  24*   CREATININE 0.8  --  0.7   *  --  142*   MG  --  1.9 1.8   PHOS  --  2.4* 1.9*   CALCIUM 8.3*  --  8.2*       Recent Labs     12/27/23  1045 12/27/23  2246 12/28/23  0254   APTT 43.9* 39.2* 28.3       Assessment:    Pain control adequate    Plan:    Continue Femoral PNC Ropivacaine infusion at current rate 7q3h IB w [id number]   Change Lyrica to 100mg q8h  Continue multimodals:   - Tylenol 1g TID  - Robaxin 1g QID  - Lyrica 150 qhs  - Oxy 5 q3h PRN

## 2023-12-31 LAB
ALBUMIN SERPL BCP-MCNC: 2.3 G/DL (ref 3.5–5.2)
ALP SERPL-CCNC: 101 U/L (ref 55–135)
ALT SERPL W/O P-5'-P-CCNC: 29 U/L (ref 10–44)
ANION GAP SERPL CALC-SCNC: 7 MMOL/L (ref 8–16)
AST SERPL-CCNC: 44 U/L (ref 10–40)
BASOPHILS # BLD AUTO: 0.07 K/UL (ref 0–0.2)
BASOPHILS NFR BLD: 0.8 % (ref 0–1.9)
BILIRUB SERPL-MCNC: 0.5 MG/DL (ref 0.1–1)
BUN SERPL-MCNC: 18 MG/DL (ref 8–23)
CALCIUM SERPL-MCNC: 8.7 MG/DL (ref 8.7–10.5)
CHLORIDE SERPL-SCNC: 105 MMOL/L (ref 95–110)
CO2 SERPL-SCNC: 25 MMOL/L (ref 23–29)
CREAT SERPL-MCNC: 0.6 MG/DL (ref 0.5–1.4)
DIFFERENTIAL METHOD BLD: ABNORMAL
EOSINOPHIL # BLD AUTO: 0.3 K/UL (ref 0–0.5)
EOSINOPHIL NFR BLD: 3.7 % (ref 0–8)
ERYTHROCYTE [DISTWIDTH] IN BLOOD BY AUTOMATED COUNT: 16.1 % (ref 11.5–14.5)
EST. GFR  (NO RACE VARIABLE): >60 ML/MIN/1.73 M^2
GLUCOSE SERPL-MCNC: 112 MG/DL (ref 70–110)
HCT VFR BLD AUTO: 29 % (ref 40–54)
HGB BLD-MCNC: 9.7 G/DL (ref 14–18)
IMM GRANULOCYTES # BLD AUTO: 0.22 K/UL (ref 0–0.04)
IMM GRANULOCYTES NFR BLD AUTO: 2.6 % (ref 0–0.5)
LYMPHOCYTES # BLD AUTO: 0.8 K/UL (ref 1–4.8)
LYMPHOCYTES NFR BLD: 9.1 % (ref 18–48)
MAGNESIUM SERPL-MCNC: 2 MG/DL (ref 1.6–2.6)
MCH RBC QN AUTO: 30.8 PG (ref 27–31)
MCHC RBC AUTO-ENTMCNC: 33.4 G/DL (ref 32–36)
MCV RBC AUTO: 92 FL (ref 82–98)
MONOCYTES # BLD AUTO: 1 K/UL (ref 0.3–1)
MONOCYTES NFR BLD: 11.9 % (ref 4–15)
NEUTROPHILS # BLD AUTO: 6.1 K/UL (ref 1.8–7.7)
NEUTROPHILS NFR BLD: 71.9 % (ref 38–73)
NRBC BLD-RTO: 0 /100 WBC
PHOSPHATE SERPL-MCNC: 3.1 MG/DL (ref 2.7–4.5)
PLATELET # BLD AUTO: 157 K/UL (ref 150–450)
PMV BLD AUTO: 10.9 FL (ref 9.2–12.9)
POTASSIUM SERPL-SCNC: 4.5 MMOL/L (ref 3.5–5.1)
PROT SERPL-MCNC: 5.7 G/DL (ref 6–8.4)
RBC # BLD AUTO: 3.15 M/UL (ref 4.6–6.2)
SODIUM SERPL-SCNC: 137 MMOL/L (ref 136–145)
WBC # BLD AUTO: 8.48 K/UL (ref 3.9–12.7)

## 2023-12-31 PROCEDURE — 25000003 PHARM REV CODE 250

## 2023-12-31 PROCEDURE — 99231 SBSQ HOSP IP/OBS SF/LOW 25: CPT | Mod: ,,, | Performed by: ANESTHESIOLOGY

## 2023-12-31 PROCEDURE — 97530 THERAPEUTIC ACTIVITIES: CPT

## 2023-12-31 PROCEDURE — 36415 COLL VENOUS BLD VENIPUNCTURE: CPT | Performed by: STUDENT IN AN ORGANIZED HEALTH CARE EDUCATION/TRAINING PROGRAM

## 2023-12-31 PROCEDURE — 85025 COMPLETE CBC W/AUTO DIFF WBC: CPT | Performed by: STUDENT IN AN ORGANIZED HEALTH CARE EDUCATION/TRAINING PROGRAM

## 2023-12-31 PROCEDURE — 99231 PR SUBSEQUENT HOSPITAL CARE,LEVL I: ICD-10-PCS | Mod: ,,, | Performed by: ANESTHESIOLOGY

## 2023-12-31 PROCEDURE — 25000003 PHARM REV CODE 250: Performed by: STUDENT IN AN ORGANIZED HEALTH CARE EDUCATION/TRAINING PROGRAM

## 2023-12-31 PROCEDURE — 63600175 PHARM REV CODE 636 W HCPCS: Performed by: STUDENT IN AN ORGANIZED HEALTH CARE EDUCATION/TRAINING PROGRAM

## 2023-12-31 PROCEDURE — 84100 ASSAY OF PHOSPHORUS: CPT

## 2023-12-31 PROCEDURE — 20600001 HC STEP DOWN PRIVATE ROOM

## 2023-12-31 PROCEDURE — 83735 ASSAY OF MAGNESIUM: CPT

## 2023-12-31 PROCEDURE — 80053 COMPREHEN METABOLIC PANEL: CPT | Performed by: STUDENT IN AN ORGANIZED HEALTH CARE EDUCATION/TRAINING PROGRAM

## 2023-12-31 RX ADMIN — ATORVASTATIN CALCIUM 80 MG: 40 TABLET, FILM COATED ORAL at 09:12

## 2023-12-31 RX ADMIN — ROPIVACAINE HYDROCHLORIDE 0.1 ML/HR: 2 INJECTION, SOLUTION EPIDURAL; INFILTRATION at 01:12

## 2023-12-31 RX ADMIN — CARVEDILOL 6.25 MG: 6.25 TABLET, FILM COATED ORAL at 09:12

## 2023-12-31 RX ADMIN — HEPARIN SODIUM 5000 UNITS: 5000 INJECTION INTRAVENOUS; SUBCUTANEOUS at 02:12

## 2023-12-31 RX ADMIN — ACETAMINOPHEN 1000 MG: 325 TABLET ORAL at 09:12

## 2023-12-31 RX ADMIN — HEPARIN SODIUM 5000 UNITS: 5000 INJECTION INTRAVENOUS; SUBCUTANEOUS at 06:12

## 2023-12-31 RX ADMIN — ONDANSETRON 8 MG: 8 TABLET, ORALLY DISINTEGRATING ORAL at 09:12

## 2023-12-31 RX ADMIN — ACETAMINOPHEN 1000 MG: 325 TABLET ORAL at 02:12

## 2023-12-31 RX ADMIN — DIVALPROEX SODIUM 125 MG: 125 TABLET, DELAYED RELEASE ORAL at 09:12

## 2023-12-31 RX ADMIN — HEPARIN SODIUM 5000 UNITS: 5000 INJECTION INTRAVENOUS; SUBCUTANEOUS at 09:12

## 2023-12-31 RX ADMIN — PREGABALIN 100 MG: 50 CAPSULE ORAL at 06:12

## 2023-12-31 RX ADMIN — METHOCARBAMOL 1000 MG: 500 TABLET ORAL at 05:12

## 2023-12-31 RX ADMIN — CEFAZOLIN 2 G: 2 INJECTION, POWDER, FOR SOLUTION INTRAMUSCULAR; INTRAVENOUS at 06:12

## 2023-12-31 RX ADMIN — DIVALPROEX SODIUM 125 MG: 125 TABLET, DELAYED RELEASE ORAL at 02:12

## 2023-12-31 RX ADMIN — METHOCARBAMOL 1000 MG: 500 TABLET ORAL at 06:12

## 2023-12-31 RX ADMIN — CEFAZOLIN 2 G: 2 INJECTION, POWDER, FOR SOLUTION INTRAMUSCULAR; INTRAVENOUS at 02:12

## 2023-12-31 RX ADMIN — PREGABALIN 100 MG: 50 CAPSULE ORAL at 09:12

## 2023-12-31 RX ADMIN — CARVEDILOL 6.25 MG: 6.25 TABLET, FILM COATED ORAL at 05:12

## 2023-12-31 RX ADMIN — METHOCARBAMOL 1000 MG: 500 TABLET ORAL at 11:12

## 2023-12-31 RX ADMIN — ASPIRIN 81 MG CHEWABLE TABLET 81 MG: 81 TABLET CHEWABLE at 09:12

## 2023-12-31 RX ADMIN — PANTOPRAZOLE SODIUM 40 MG: 40 TABLET, DELAYED RELEASE ORAL at 05:12

## 2023-12-31 RX ADMIN — METHOCARBAMOL 1000 MG: 500 TABLET ORAL at 12:12

## 2023-12-31 RX ADMIN — PREGABALIN 100 MG: 50 CAPSULE ORAL at 02:12

## 2023-12-31 RX ADMIN — QUETIAPINE FUMARATE 25 MG: 25 TABLET ORAL at 09:12

## 2023-12-31 RX ADMIN — CEFAZOLIN 2 G: 2 INJECTION, POWDER, FOR SOLUTION INTRAMUSCULAR; INTRAVENOUS at 11:12

## 2023-12-31 NOTE — ADDENDUM NOTE
Addendum  created 12/31/23 1613 by Alli Montes MD    Charge Capture section accepted, Cosign clinical note with attestation

## 2023-12-31 NOTE — ASSESSMENT & PLAN NOTE
63 M with long history of PAD, s/p SFA to above the knee popliteal artery bypass, current smoker, presents as a transfer due to concern for decreased sensation with concern for rest pain secondary to occluded bypass graft now s/p angiogram, thrombolysis, and posterior tibial artery exploration. Unfortunately, no good options for revascularization of this patients left leg. After long discussion with patient this morning he is amenable to that surgery. Now s/p left AKA on 12/28. Stump healing appropriately.    - Continue aspirin, statin  - Ropivacaine in pnc per anesthesia   - MM pain control   - Replace dressing as needed  - Stump protector    Dispo: Patient will need SNF early next week, appreciate SW assistance

## 2023-12-31 NOTE — SUBJECTIVE & OBJECTIVE
Medications:  Continuous Infusions:   ROPIvacaine (PF) 2 mg/ml (0.2%) 0.1 mL/hr (12/30/23 0231)     Scheduled Meds:   acetaminophen  1,000 mg Oral TID    aspirin  81 mg Oral Daily    atorvastatin  80 mg Oral Daily    carvediloL  6.25 mg Oral BID WM    ceFAZolin (ANCEF) IVPB  2 g Intravenous Q8H    divalproex  125 mg Oral TID    heparin (porcine)  5,000 Units Subcutaneous Q8H    methocarbamoL  1,000 mg Oral Q6H    pantoprazole  40 mg Oral Daily    pregabalin  100 mg Oral Q8H    QUEtiapine  25 mg Oral QHS     PRN Meds:hydrALAZINE, hydrOXYzine, labetalol, melatonin, ondansetron, oxyCODONE **AND** [DISCONTINUED] oxyCODONE, promethazine, sodium chloride 0.9%     Objective:     Vital Signs (Most Recent):  Temp: 98.4 °F (36.9 °C) (12/31/23 0000)  Pulse: 66 (12/31/23 0000)  Resp: 18 (12/31/23 0000)  BP: (!) 151/69 (12/31/23 0000)  SpO2: 95 % (12/31/23 0000) Vital Signs (24h Range):  Temp:  [98.2 °F (36.8 °C)-98.4 °F (36.9 °C)] 98.4 °F (36.9 °C)  Pulse:  [66-86] 66  Resp:  [18] 18  SpO2:  [95 %-99 %] 95 %  BP: (136-151)/(65-69) 151/69          Physical Exam  Vitals reviewed.   Constitutional:       Appearance: Normal appearance.   Cardiovascular:      Rate and Rhythm: Normal rate and regular rhythm.   Pulmonary:      Effort: Pulmonary effort is normal. No respiratory distress.   Abdominal:      Palpations: Abdomen is soft.      Tenderness: There is no abdominal tenderness.   Musculoskeletal:      Comments: S/p left AKA. Incision healing appropriately. Re dressed with gauze and ace wrap.    Skin:     General: Skin is warm.   Neurological:      General: No focal deficit present.      Mental Status: He is alert and oriented to person, place, and time.          Significant Labs:  CBC:   Recent Labs   Lab 12/31/23  0530   WBC 8.48   RBC 3.15*   HGB 9.7*   HCT 29.0*      MCV 92   MCH 30.8   MCHC 33.4       CMP:   Recent Labs   Lab 12/31/23  0531   *   CALCIUM 8.7   ALBUMIN 2.3*   PROT 5.7*      K 4.5   CO2 25       BUN 18   CREATININE 0.6   ALKPHOS 101   ALT 29   AST 44*   BILITOT 0.5

## 2023-12-31 NOTE — PT/OT/SLP PROGRESS
Occupational Therapy   Treatment    Name: Jamari Huerta III  MRN: [id number]  Admitting Diagnosis:  Thrombosis of femoro-popliteal bypass graft  3 Days Post-Op    Recommendations:     Discharge Recommendations: Moderate Intensity Therapy  Discharge Equipment Recommendations:  wheelchair, bath bench  Barriers to discharge:  Decreased caregiver support    Assessment:     Jamari Huerta III is a 63 y.o. male with a medical diagnosis of Thrombosis of femoro-popliteal bypass graft.  He presents with left hemiparesis from prior CVA and now s/p left AKA on 12/28/23. Pt with impulsivity and impaired insight making him a fall risk at this time. He has poor social support and will require additional assistance post acute care. Performance deficits affecting function are weakness, impaired endurance, impaired self care skills, impaired functional mobility, gait instability, decreased upper extremity function, decreased lower extremity function, impaired balance, decreased safety awareness, pain.     Rehab Prognosis:  Good; patient would benefit from acute skilled OT services to address these deficits and reach maximum level of function.       Plan:     Patient to be seen 4 x/week to address the above listed problems via self-care/home management, therapeutic activities, therapeutic exercises, neuromuscular re-education  Plan of Care Expires: 01/28/24  Plan of Care Reviewed with: patient, family    Subjective     Chief Complaint: having a hard time getting a cough out today  Patient/Family Comments/goals: unable to report  Pain/Comfort:  Pain Rating 1: 0/10  Pain Addressed 1: Pre-medicate for activity  Pain Rating Post-Intervention 1: 0/10    Objective:     Communicated with: RN prior to session.  Patient found HOB elevated with bed alarm, peripheral IV, perineural catheter upon OT entry to room.    General Precautions: Standard, fall    Orthopedic Precautions:LLE non weight bearing/amputation   Braces: N/A  Respiratory Status:  Room air     Occupational Performance:     Bed Mobility:    Patient completed Rolling/Turning to Right with contact guard assistance and with side rail  Patient completed Supine to Sit with contact guard assistance and with side rail     Functional Mobility/Transfers:  Patient completed Sit <> Stand Transfer with minimum assistance  with  hand-held assist   Patient completed Bed <> Chair Transfer using Step Transfer technique with minimum assistance with hand-held assist    Activities of Daily Living:  Feeding:  independence with set up  Grooming: modified independence (right hand dominant) with set up while seated in chair  Upper Body Dressing: minimum assistance loli technique to doff soiled gown and don clean gown  Lower Body Dressing: maximal assistance to don right sock    [facility] 6 Click ADL: 18    Treatment & Education:  -Pt alert and agreeable to therapy session  -edu on OT role in care and importance of activity and mobility for healing process   -discussed use of call light for staff assistance and need for assistance for all transfers and OOB activity     Patient left up in chair with all lines intact, call button in reach, chair alarm on, RN notified, and sister in law present    GOALS:   Multidisciplinary Problems       Occupational Therapy Goals          Problem: Occupational Therapy    Goal Priority Disciplines Outcome Interventions   Occupational Therapy Goal     OT, PT/OT Ongoing, Progressing    Description: Goals to be met by: 1/28/2024     Patient will increase functional independence with ADLs by performing:    LE Dressing with Stand-by Assistance.  Grooming while standing at sink with Set-up Assistance and Supervision.  Toileting from toilet with Supervision for hygiene and clothing management.   Toilet transfer to toilet with Stand-by Assistance.                         Time Tracking:     OT Date of Treatment: 12/31/23  OT Start Time: 1040  OT Stop Time: 1104  OT Total Time (min): 24  min    Billable Minutes:Therapeutic Activity 24    OT/BRUCE: OT          12/31/2023

## 2023-12-31 NOTE — PROGRESS NOTES
[facility]  Vascular Surgery  Progress Note    Patient Name: Jamari Huerta III  MRN: [id number]  Admission Date: 12/21/2023  Primary Care Provider: Nataliya Anderson MD    Subjective:     Interval History: NAEON. Feeling well this morning. Pain well controlled. Tolerated dressing change. AVSS. Stump healing well. Lab work stable.    Post-Op Info:  Procedure(s) (LRB):  AMPUTATION, ABOVE KNEE (Left)   3 Days Post-Op     Medications:  Continuous Infusions:   ROPIvacaine (PF) 2 mg/ml (0.2%) 0.1 mL/hr (12/30/23 0231)     Scheduled Meds:   acetaminophen  1,000 mg Oral TID    aspirin  81 mg Oral Daily    atorvastatin  80 mg Oral Daily    carvediloL  6.25 mg Oral BID WM    ceFAZolin (ANCEF) IVPB  2 g Intravenous Q8H    divalproex  125 mg Oral TID    heparin (porcine)  5,000 Units Subcutaneous Q8H    methocarbamoL  1,000 mg Oral Q6H    pantoprazole  40 mg Oral Daily    pregabalin  100 mg Oral Q8H    QUEtiapine  25 mg Oral QHS     PRN Meds:hydrALAZINE, hydrOXYzine, labetalol, melatonin, ondansetron, oxyCODONE **AND** [DISCONTINUED] oxyCODONE, promethazine, sodium chloride 0.9%     Objective:     Vital Signs (Most Recent):  Temp: 98.4 °F (36.9 °C) (12/31/23 0000)  Pulse: 66 (12/31/23 0000)  Resp: 18 (12/31/23 0000)  BP: (!) 151/69 (12/31/23 0000)  SpO2: 95 % (12/31/23 0000) Vital Signs (24h Range):  Temp:  [98.2 °F (36.8 °C)-98.4 °F (36.9 °C)] 98.4 °F (36.9 °C)  Pulse:  [66-86] 66  Resp:  [18] 18  SpO2:  [95 %-99 %] 95 %  BP: (136-151)/(65-69) 151/69         Physical Exam  Vitals reviewed.   Constitutional:       Appearance: Normal appearance.   Cardiovascular:      Rate and Rhythm: Normal rate and regular rhythm.   Pulmonary:      Effort: Pulmonary effort is normal. No respiratory distress.   Abdominal:      Palpations: Abdomen is soft.      Tenderness: There is no abdominal tenderness.   Musculoskeletal:      Comments: S/p left AKA. Incision healing appropriately. Re dressed with gauze and ace wrap.    Skin:     General:  Skin is warm.   Neurological:      General: No focal deficit present.      Mental Status: He is alert and oriented to person, place, and time.          Significant Labs:  CBC:   Recent Labs   Lab 12/31/23  0530   WBC 8.48   RBC 3.15*   HGB 9.7*   HCT 29.0*      MCV 92   MCH 30.8   MCHC 33.4       CMP:   Recent Labs   Lab 12/31/23  0531   *   CALCIUM 8.7   ALBUMIN 2.3*   PROT 5.7*      K 4.5   CO2 25      BUN 18   CREATININE 0.6   ALKPHOS 101   ALT 29   AST 44*   BILITOT 0.5         Assessment/Plan:     * Thrombosis of femoro-popliteal bypass graft  63 M with long history of PAD, s/p SFA to above the knee popliteal artery bypass, current smoker, presents as a transfer due to concern for decreased sensation with concern for rest pain secondary to occluded bypass graft now s/p angiogram, thrombolysis, and posterior tibial artery exploration. Unfortunately, no good options for revascularization of this patients left leg. After long discussion with patient this morning he is amenable to that surgery. Now s/p left AKA on 12/28. Stump healing appropriately.    - Continue aspirin, statin  - Ropivacaine in pnc per anesthesia   - MM pain control   - Replace dressing as needed  - Stump protector    Dispo: Patient will need SNF early next week, appreciate SW assistance         Amador Mccarty MD  Vascular Surgery  [facility]   n/a

## 2023-12-31 NOTE — ANESTHESIA POST-OP PAIN MANAGEMENT
Acute Pain Service Progress Note    Jamari Huerta III is a 63 y.o., male, [id number]. Jamari Huerta III is a 63 y.o., male, [id number] w PMH of CAD, hx CVA 6 mo prior with residual left-sided deficits, CKD, LLE DVT in 2002 who presented with a fem pop bypass occlusion in [facility] and is now s/p L AKA.     Surgery:  L AKA    Post Op Day #: 3    Catheter type: perineural  femoral    Infusion type: Ropivacaine 0.2%  7q3h IB + [id number] DB     Problem List:    Active Hospital Problems    Diagnosis  POA    *Thrombosis of femoro-popliteal bypass graft [T82.868A]  Yes     SFA to above knee pop bypass        Resolved Hospital Problems   No resolved problems to display.       Subjective:     General appearance of  sleeping   Pain with rest: 3    Numbers   Pain with movement: 4    Numbers   Side Effects    1. Pruritis No    2. Nausea No    3. Motor Blockade No, 0=Ability to raise lower extremities off bed    4. Sedation No, S=sleep, easy to arouse    Objective:                   Catheter level L Femoral              Catheter site clean, dry, intact      Vitals   Vitals:    12/31/23 0000   BP: (!) 151/69   Pulse: 66   Resp: 18   Temp: 36.9 °C (98.4 °F)        Labs    No results displayed because visit has over 200 results.           Meds   Current Facility-Administered Medications   Medication Dose Route Frequency Provider Last Rate Last Admin    acetaminophen tablet 1,000 mg  1,000 mg Oral TID LEROY Mcdonnell MD   1,000 mg at 12/31/23 0905    aspirin chewable tablet 81 mg  81 mg Oral Daily Denny Sow MD   81 mg at 12/31/23 0906    atorvastatin tablet 80 mg  80 mg Oral Daily Denny Sow MD   80 mg at 12/31/23 0905    carvediloL tablet 6.25 mg  6.25 mg Oral BID WM Denny Sow MD   6.25 mg at 12/31/23 0905    ceFAZolin 2 g in dextrose 5 % in water (D5W) 50 mL IVPB (MB+)  2 g Intravenous Q8H Denny Sow MD   Stopped at 12/31/23 0705    divalproex EC tablet 125 mg  125 mg Oral TID [facility] [facility]  Denny COMER MD   125 mg at 12/31/23 0905    heparin (porcine) injection 5,000 Units  5,000 Units Subcutaneous Q8H Jomar Ratliff MD   5,000 Units at 12/31/23 0632    hydrALAZINE injection 10 mg  10 mg Intravenous Q6H PRN Denny Sow MD   10 mg at 12/30/23 0451    hydrOXYzine HCL tablet 25 mg  25 mg Oral TID PRN Denny Sow MD   25 mg at 12/26/23 1321    labetalol 20 mg/4 mL (5 mg/mL) IV syring  10 mg Intravenous Q6H PRN Denny Sow MD   10 mg at 12/25/23 2256    melatonin tablet 6 mg  6 mg Oral Nightly PRN Denny Sow MD   6 mg at 12/27/23 2020    methocarbamoL tablet 1,000 mg  1,000 mg Oral Q6H Brandi Haney MD   1,000 mg at 12/31/23 0632    ondansetron disintegrating tablet 8 mg  8 mg Oral Q8H PRN Denny Sow MD   8 mg at 12/30/23 0929    oxyCODONE immediate release tablet 5 mg  5 mg Oral Q3H PRN Brandi Haney MD   5 mg at 12/30/23 0457    pantoprazole EC tablet 40 mg  40 mg Oral Daily Denny Sow MD   40 mg at 12/30/23 1714    pregabalin capsule 100 mg  100 mg Oral Q8H Kvng Osullivan MD   100 mg at 12/31/23 0632    promethazine tablet 25 mg  25 mg Oral Q6H PRN Denny Sow MD        QUEtiapine tablet 25 mg  25 mg Oral QHS Denny Sow MD   25 mg at 12/30/23 2148    ROPIvacaine (PF) 2 mg/ml (0.2%) solution  0.1 mL/hr Perineural Continuous Yehuda Harper MD 0.1 mL/hr at 12/30/23 0231 0.1 mL/hr at 12/30/23 0231    sodium chloride 0.9% flush 10 mL  10 mL Intravenous Q6H PRN Denny Sow MD            Anticoagulant dose Heparin 5000U SQ q8h.     Assessment:     Pain control adequate    Plan:                 Patient doing well, continue present treatment.     1) Continue Femoral PNC Ropivacaine infusion at current rate 7q3h IB w [id number] DB     2) Continue multimodals:   - Tylenol 1g TID  - Robaxin 1g QID  - Lyrica 150 qhs      Kvng Osullivan, PGY2  Department of Anesthesiology  [facility]  [place]

## 2023-12-31 NOTE — NURSING
"RN demonstrated to patient how to use the Incentive Spirometer.  RN left patient's room and at that point patient was yelling, "help."  Patient was on his left side in front of the reclining chair.  Patient reported no pain or injury and denied falling onto his stump.    Charge RN was notified.  Patient was picked upp off the floor by 3 nurses and put back into the chair.  MD to examine patient.  No injuries or diagnotic testing were ordered.  Patient helped back to bed.  Still denying any pain or discomfort.  "

## 2024-01-01 LAB
BASOPHILS # BLD AUTO: 0.07 K/UL (ref 0–0.2)
BASOPHILS NFR BLD: 0.8 % (ref 0–1.9)
DIFFERENTIAL METHOD BLD: ABNORMAL
EOSINOPHIL # BLD AUTO: 0.3 K/UL (ref 0–0.5)
EOSINOPHIL NFR BLD: 3.6 % (ref 0–8)
ERYTHROCYTE [DISTWIDTH] IN BLOOD BY AUTOMATED COUNT: 16 % (ref 11.5–14.5)
HCT VFR BLD AUTO: 31.6 % (ref 40–54)
HGB BLD-MCNC: 10.1 G/DL (ref 14–18)
IMM GRANULOCYTES # BLD AUTO: 0.18 K/UL (ref 0–0.04)
IMM GRANULOCYTES NFR BLD AUTO: 2.2 % (ref 0–0.5)
LYMPHOCYTES # BLD AUTO: 0.7 K/UL (ref 1–4.8)
LYMPHOCYTES NFR BLD: 8.3 % (ref 18–48)
MAGNESIUM SERPL-MCNC: 2 MG/DL (ref 1.6–2.6)
MCH RBC QN AUTO: 30.8 PG (ref 27–31)
MCHC RBC AUTO-ENTMCNC: 32 G/DL (ref 32–36)
MCV RBC AUTO: 96 FL (ref 82–98)
MONOCYTES # BLD AUTO: 0.8 K/UL (ref 0.3–1)
MONOCYTES NFR BLD: 9 % (ref 4–15)
NEUTROPHILS # BLD AUTO: 6.3 K/UL (ref 1.8–7.7)
NEUTROPHILS NFR BLD: 76.1 % (ref 38–73)
NRBC BLD-RTO: 0 /100 WBC
PHOSPHATE SERPL-MCNC: 3.3 MG/DL (ref 2.7–4.5)
PLATELET # BLD AUTO: 185 K/UL (ref 150–450)
PMV BLD AUTO: 11.3 FL (ref 9.2–12.9)
RBC # BLD AUTO: 3.28 M/UL (ref 4.6–6.2)
WBC # BLD AUTO: 8.3 K/UL (ref 3.9–12.7)

## 2024-01-01 PROCEDURE — 25000003 PHARM REV CODE 250: Performed by: STUDENT IN AN ORGANIZED HEALTH CARE EDUCATION/TRAINING PROGRAM

## 2024-01-01 PROCEDURE — 25000003 PHARM REV CODE 250

## 2024-01-01 PROCEDURE — 63600175 PHARM REV CODE 636 W HCPCS: Performed by: STUDENT IN AN ORGANIZED HEALTH CARE EDUCATION/TRAINING PROGRAM

## 2024-01-01 PROCEDURE — 20600001 HC STEP DOWN PRIVATE ROOM

## 2024-01-01 PROCEDURE — 36415 COLL VENOUS BLD VENIPUNCTURE: CPT

## 2024-01-01 PROCEDURE — 83735 ASSAY OF MAGNESIUM: CPT

## 2024-01-01 PROCEDURE — 84100 ASSAY OF PHOSPHORUS: CPT

## 2024-01-01 PROCEDURE — 94761 N-INVAS EAR/PLS OXIMETRY MLT: CPT

## 2024-01-01 PROCEDURE — 85025 COMPLETE CBC W/AUTO DIFF WBC: CPT | Performed by: STUDENT IN AN ORGANIZED HEALTH CARE EDUCATION/TRAINING PROGRAM

## 2024-01-01 RX ORDER — OXYCODONE HYDROCHLORIDE 5 MG/1
5 TABLET ORAL
Status: DISCONTINUED | OUTPATIENT
Start: 2024-01-01 | End: 2024-01-05 | Stop reason: HOSPADM

## 2024-01-01 RX ADMIN — METHOCARBAMOL 1000 MG: 500 TABLET ORAL at 11:01

## 2024-01-01 RX ADMIN — DIVALPROEX SODIUM 125 MG: 125 TABLET, DELAYED RELEASE ORAL at 08:01

## 2024-01-01 RX ADMIN — PREGABALIN 100 MG: 50 CAPSULE ORAL at 02:01

## 2024-01-01 RX ADMIN — ACETAMINOPHEN 1000 MG: 325 TABLET ORAL at 02:01

## 2024-01-01 RX ADMIN — CEFAZOLIN 2 G: 2 INJECTION, POWDER, FOR SOLUTION INTRAMUSCULAR; INTRAVENOUS at 02:01

## 2024-01-01 RX ADMIN — DIVALPROEX SODIUM 125 MG: 125 TABLET, DELAYED RELEASE ORAL at 02:01

## 2024-01-01 RX ADMIN — METHOCARBAMOL 1000 MG: 500 TABLET ORAL at 06:01

## 2024-01-01 RX ADMIN — CEFAZOLIN 2 G: 2 INJECTION, POWDER, FOR SOLUTION INTRAMUSCULAR; INTRAVENOUS at 06:01

## 2024-01-01 RX ADMIN — QUETIAPINE FUMARATE 25 MG: 25 TABLET ORAL at 09:01

## 2024-01-01 RX ADMIN — OXYCODONE HYDROCHLORIDE 5 MG: 5 TABLET ORAL at 02:01

## 2024-01-01 RX ADMIN — CARVEDILOL 6.25 MG: 6.25 TABLET, FILM COATED ORAL at 05:01

## 2024-01-01 RX ADMIN — PANTOPRAZOLE SODIUM 40 MG: 40 TABLET, DELAYED RELEASE ORAL at 05:01

## 2024-01-01 RX ADMIN — CEFAZOLIN 2 G: 2 INJECTION, POWDER, FOR SOLUTION INTRAMUSCULAR; INTRAVENOUS at 11:01

## 2024-01-01 RX ADMIN — ACETAMINOPHEN 1000 MG: 325 TABLET ORAL at 08:01

## 2024-01-01 RX ADMIN — ACETAMINOPHEN 1000 MG: 325 TABLET ORAL at 09:01

## 2024-01-01 RX ADMIN — CARVEDILOL 6.25 MG: 6.25 TABLET, FILM COATED ORAL at 06:01

## 2024-01-01 RX ADMIN — ATORVASTATIN CALCIUM 80 MG: 40 TABLET, FILM COATED ORAL at 11:01

## 2024-01-01 RX ADMIN — HEPARIN SODIUM 5000 UNITS: 5000 INJECTION INTRAVENOUS; SUBCUTANEOUS at 06:01

## 2024-01-01 RX ADMIN — PREGABALIN 100 MG: 50 CAPSULE ORAL at 09:01

## 2024-01-01 RX ADMIN — ASPIRIN 81 MG CHEWABLE TABLET 81 MG: 81 TABLET CHEWABLE at 08:01

## 2024-01-01 RX ADMIN — DIVALPROEX SODIUM 125 MG: 125 TABLET, DELAYED RELEASE ORAL at 09:01

## 2024-01-01 RX ADMIN — HEPARIN SODIUM 5000 UNITS: 5000 INJECTION INTRAVENOUS; SUBCUTANEOUS at 02:01

## 2024-01-01 RX ADMIN — HEPARIN SODIUM 5000 UNITS: 5000 INJECTION INTRAVENOUS; SUBCUTANEOUS at 09:01

## 2024-01-01 RX ADMIN — METHOCARBAMOL 1000 MG: 500 TABLET ORAL at 05:01

## 2024-01-01 RX ADMIN — OXYCODONE HYDROCHLORIDE 5 MG: 5 TABLET ORAL at 08:01

## 2024-01-01 RX ADMIN — PREGABALIN 100 MG: 50 CAPSULE ORAL at 06:01

## 2024-01-01 NOTE — ASSESSMENT & PLAN NOTE
63 M with long history of PAD, s/p SFA to above the knee popliteal artery bypass, current smoker, presents as a transfer due to concern for decreased sensation with concern for rest pain secondary to occluded bypass graft now s/p angiogram, thrombolysis, and posterior tibial artery exploration. Unfortunately, no good options for revascularization of this patients left leg. After long discussion with patient this morning he is amenable to that surgery. Now s/p left AKA on 12/28. Stump healing appropriately.    - Continue aspirin, statin  - Ropivacaine in pnc per anesthesia, ok to discontinue from our perspective  - MM PO/IV pain control   - Replace dressing as needed  - Stump protector    Dispo: Patient will need SNF early next week, appreciate SW assistance

## 2024-01-01 NOTE — SUBJECTIVE & OBJECTIVE
Medications:  Continuous Infusions:   ROPIvacaine (PF) 2 mg/ml (0.2%) 0.1 mL/hr (12/31/23 1321)     Scheduled Meds:   acetaminophen  1,000 mg Oral TID    aspirin  81 mg Oral Daily    atorvastatin  80 mg Oral Daily    carvediloL  6.25 mg Oral BID WM    ceFAZolin (ANCEF) IVPB  2 g Intravenous Q8H    divalproex  125 mg Oral TID    heparin (porcine)  5,000 Units Subcutaneous Q8H    methocarbamoL  1,000 mg Oral Q6H    pantoprazole  40 mg Oral Daily    pregabalin  100 mg Oral Q8H    QUEtiapine  25 mg Oral QHS     PRN Meds:hydrALAZINE, hydrOXYzine, labetalol, melatonin, ondansetron, oxyCODONE **AND** [DISCONTINUED] oxyCODONE, promethazine, sodium chloride 0.9%     Objective:     Vital Signs (Most Recent):  Temp: 98.2 °F (36.8 °C) (01/01/24 1146)  Pulse: 71 (01/01/24 1146)  Resp: 18 (01/01/24 1146)  BP: (!) 144/67 (01/01/24 1146)  SpO2: (!) 90 % (01/01/24 1146) Vital Signs (24h Range):  Temp:  [97.7 °F (36.5 °C)-99.9 °F (37.7 °C)] 98.2 °F (36.8 °C)  Pulse:  [69-81] 71  Resp:  [18] 18  SpO2:  [90 %-96 %] 90 %  BP: (144-169)/(67-87) 144/67          Physical Exam  Vitals reviewed.   Constitutional:       Appearance: Normal appearance.   Cardiovascular:      Rate and Rhythm: Normal rate and regular rhythm.   Pulmonary:      Effort: Pulmonary effort is normal. No respiratory distress.   Abdominal:      Palpations: Abdomen is soft.      Tenderness: There is no abdominal tenderness.   Musculoskeletal:      Comments: S/p left AKA. Incision healing appropriately. Re dressed with gauze and ace wrap.    Skin:     General: Skin is warm.   Neurological:      General: No focal deficit present.      Mental Status: He is alert and oriented to person, place, and time.          Significant Labs:  CBC:   Recent Labs   Lab 01/01/24  0425   WBC 8.30   RBC 3.28*   HGB 10.1*   HCT 31.6*      MCV 96   MCH 30.8   MCHC 32.0       CMP:   Recent Labs   Lab 12/31/23  0531   *   CALCIUM 8.7   ALBUMIN 2.3*   PROT 5.7*      K 4.5    CO2 25      BUN 18   CREATININE 0.6   ALKPHOS 101   ALT 29   AST 44*   BILITOT 0.5

## 2024-01-01 NOTE — PROGRESS NOTES
[facility]  Vascular Surgery  Progress Note    Patient Name: Jamari Huerta III  MRN: [id number]  Admission Date: 12/21/2023  Primary Care Provider: Nataliya Anderson MD    Subjective:     Interval History: NAEON. Feeling well this morning. Pain well controlled. AVSS. Incision healing well. Lab work stable.    Post-Op Info:  Procedure(s) (LRB):  AMPUTATION, ABOVE KNEE (Left)   4 Days Post-Op     Medications:  Continuous Infusions:   ROPIvacaine (PF) 2 mg/ml (0.2%) 0.1 mL/hr (12/31/23 1321)     Scheduled Meds:   acetaminophen  1,000 mg Oral TID    aspirin  81 mg Oral Daily    atorvastatin  80 mg Oral Daily    carvediloL  6.25 mg Oral BID WM    ceFAZolin (ANCEF) IVPB  2 g Intravenous Q8H    divalproex  125 mg Oral TID    heparin (porcine)  5,000 Units Subcutaneous Q8H    methocarbamoL  1,000 mg Oral Q6H    pantoprazole  40 mg Oral Daily    pregabalin  100 mg Oral Q8H    QUEtiapine  25 mg Oral QHS     PRN Meds:hydrALAZINE, hydrOXYzine, labetalol, melatonin, ondansetron, oxyCODONE **AND** [DISCONTINUED] oxyCODONE, promethazine, sodium chloride 0.9%     Objective:     Vital Signs (Most Recent):  Temp: 98.2 °F (36.8 °C) (01/01/24 1146)  Pulse: 71 (01/01/24 1146)  Resp: 18 (01/01/24 1146)  BP: (!) 144/67 (01/01/24 1146)  SpO2: (!) 90 % (01/01/24 1146) Vital Signs (24h Range):  Temp:  [97.7 °F (36.5 °C)-99.9 °F (37.7 °C)] 98.2 °F (36.8 °C)  Pulse:  [69-81] 71  Resp:  [18] 18  SpO2:  [90 %-96 %] 90 %  BP: (144-169)/(67-87) 144/67         Physical Exam  Vitals reviewed.   Constitutional:       Appearance: Normal appearance.   Cardiovascular:      Rate and Rhythm: Normal rate and regular rhythm.   Pulmonary:      Effort: Pulmonary effort is normal. No respiratory distress.   Abdominal:      Palpations: Abdomen is soft.      Tenderness: There is no abdominal tenderness.   Musculoskeletal:      Comments: S/p left AKA. Incision healing appropriately. Re dressed with gauze and ace wrap.    Skin:     General: Skin is warm.    Neurological:      General: No focal deficit present.      Mental Status: He is alert and oriented to person, place, and time.          Significant Labs:  CBC:   Recent Labs   Lab 01/01/24  0425   WBC 8.30   RBC 3.28*   HGB 10.1*   HCT 31.6*      MCV 96   MCH 30.8   MCHC 32.0       CMP:   Recent Labs   Lab 12/31/23  0531   *   CALCIUM 8.7   ALBUMIN 2.3*   PROT 5.7*      K 4.5   CO2 25      BUN 18   CREATININE 0.6   ALKPHOS 101   ALT 29   AST 44*   BILITOT 0.5         Assessment/Plan:     * Thrombosis of femoro-popliteal bypass graft  63 M with long history of PAD, s/p SFA to above the knee popliteal artery bypass, current smoker, presents as a transfer due to concern for decreased sensation with concern for rest pain secondary to occluded bypass graft now s/p angiogram, thrombolysis, and posterior tibial artery exploration. Unfortunately, no good options for revascularization of this patients left leg. After long discussion with patient this morning he is amenable to that surgery. Now s/p left AKA on 12/28. Stump healing appropriately.    - Continue aspirin, statin  - Ropivacaine in pnc per anesthesia, ok to discontinue from our perspective  - MM PO/IV pain control   - Replace dressing as needed  - Stump protector    Dispo: Patient will need SNF early next week, appreciate SW assistance         Amador Mccarty MD  Vascular Surgery  [facility]

## 2024-01-01 NOTE — ANESTHESIA POST-OP PAIN MANAGEMENT
Acute Pain Service Progress Note    Jamari Huerta III is a 63 y.o., male, [id number]. Jamari Huerta III is a 63 y.o., male, [id number] w PMH of CAD, hx CVA 6 mo prior with residual left-sided deficits, CKD, LLE DVT in 2002 who presented with a fem pop bypass occlusion in [facility] and is now s/p L AKA.     Surgery:  L AKA    Post Op Day #: 4    Catheter type: perineural  femoral    Infusion type: Ropivacaine 0.2%  7q3h IB + [id number] DB. Paused and pulled 1/1/24    Problem List:    Active Hospital Problems    Diagnosis  POA    *Thrombosis of femoro-popliteal bypass graft [T82.868A]  Yes     SFA to above knee pop bypass        Resolved Hospital Problems   No resolved problems to display.       Subjective:     General appearance of alert, oriented   Pain with rest: 1    Numbers   Pain with movement: 4    Numbers   Side Effects    1. Pruritis No    2. Nausea No    3. Motor Blockade No, 0=Ability to raise lower extremities off bed    4. Sedation No, 1=awake and alert    Objective:                   Catheter level L Femoral              Catheter site clean, dry, intact      Vitals   Vitals:    01/01/24 1146   BP: (!) 144/67   Pulse: 71   Resp: 18   Temp: 36.8 °C (98.2 °F)          Labs    No results displayed because visit has over 200 results.           Meds   Current Facility-Administered Medications   Medication Dose Route Frequency Provider Last Rate Last Admin    acetaminophen tablet 1,000 mg  1,000 mg Oral TID LEROY Mcdonnell MD   1,000 mg at 01/01/24 0848    aspirin chewable tablet 81 mg  81 mg Oral Daily Denny Sow MD   81 mg at 01/01/24 0848    atorvastatin tablet 80 mg  80 mg Oral Daily Denny Sow MD   80 mg at 01/01/24 1149    carvediloL tablet 6.25 mg  6.25 mg Oral BID WM Denny Sow MD   6.25 mg at 01/01/24 0645    ceFAZolin 2 g in dextrose 5 % in water (D5W) 50 mL IVPB (MB+)  2 g Intravenous Q8H Denny Sow MD   Stopped at 01/01/24 0714    divalproex EC tablet 125 mg  125  mg Oral TID Denny Sow MD   125 mg at 01/01/24 0848    heparin (porcine) injection 5,000 Units  5,000 Units Subcutaneous Q8H Jomar Ratliff MD   5,000 Units at 01/01/24 0642    hydrALAZINE injection 10 mg  10 mg Intravenous Q6H PRN Denny Sow MD   10 mg at 12/30/23 0451    hydrOXYzine HCL tablet 25 mg  25 mg Oral TID PRN Denny Sow MD   25 mg at 12/26/23 1321    labetalol 20 mg/4 mL (5 mg/mL) IV syring  10 mg Intravenous Q6H PRN Denny Sow MD   10 mg at 12/25/23 2256    melatonin tablet 6 mg  6 mg Oral Nightly PRN Denny Sow MD   6 mg at 12/27/23 2020    methocarbamoL tablet 1,000 mg  1,000 mg Oral Q6H Brandi Haney MD   1,000 mg at 01/01/24 1149    ondansetron disintegrating tablet 8 mg  8 mg Oral Q8H PRN Denny Sow MD   8 mg at 12/31/23 2126    oxyCODONE immediate release tablet 5 mg  5 mg Oral Q3H PRN Yashira Patel DO        pantoprazole EC tablet 40 mg  40 mg Oral Daily Denny Sow MD   40 mg at 12/31/23 1703    pregabalin capsule 100 mg  100 mg Oral Q8H Kvng Osullivan MD   100 mg at 01/01/24 0641    promethazine tablet 25 mg  25 mg Oral Q6H PRN Denny Sow MD        QUEtiapine tablet 25 mg  25 mg Oral QHS Denny Sow MD   25 mg at 12/31/23 2125    ROPIvacaine (PF) 2 mg/ml (0.2%) solution  0.1 mL/hr Perineural Continuous Yehuda Harper MD 0.1 mL/hr at 12/31/23 1321 0.1 mL/hr at 12/31/23 1321    sodium chloride 0.9% flush 10 mL  10 mL Intravenous Q6H PRN Denny Sow MD            Anticoagulant dose Heparin 5000U SQ q8h.     Assessment:     Pain control adequate    Plan:                 Patient doing well. PNC paused this morning at approximately 7am. Pain control adequate when reassessed this afternoon at 1:30pm. PNC pulled. Discussed recommendation for oral pain medication if patient is in pain. Patient agreed with pain.      1) Pulled Femoral PNC Ropivacaine. Blue tip  visualized on cath. No complications.      2) Continue multimodals:   - Tylenol 1g TID  - Robaxin 1g QID  - Lyrica 150 qhs  - Oxycodone 5mg PRN mod/severe pain      Thank you for allowing us to participate in Mr. Huerta's care. Will sign off. Please do not hesitate to contact us with any questions.       Yashira Patel, PGY2  Department of Anesthesiology  [facility]

## 2024-01-02 LAB
BASOPHILS # BLD AUTO: 0.07 K/UL (ref 0–0.2)
BASOPHILS NFR BLD: 0.8 % (ref 0–1.9)
DIFFERENTIAL METHOD BLD: ABNORMAL
EOSINOPHIL # BLD AUTO: 0.3 K/UL (ref 0–0.5)
EOSINOPHIL NFR BLD: 3.3 % (ref 0–8)
ERYTHROCYTE [DISTWIDTH] IN BLOOD BY AUTOMATED COUNT: 16.2 % (ref 11.5–14.5)
HCT VFR BLD AUTO: 34 % (ref 40–54)
HGB BLD-MCNC: 10.5 G/DL (ref 14–18)
IMM GRANULOCYTES # BLD AUTO: 0.21 K/UL (ref 0–0.04)
IMM GRANULOCYTES NFR BLD AUTO: 2.4 % (ref 0–0.5)
LYMPHOCYTES # BLD AUTO: 1 K/UL (ref 1–4.8)
LYMPHOCYTES NFR BLD: 11.3 % (ref 18–48)
MAGNESIUM SERPL-MCNC: 2 MG/DL (ref 1.6–2.6)
MCH RBC QN AUTO: 30.3 PG (ref 27–31)
MCHC RBC AUTO-ENTMCNC: 30.9 G/DL (ref 32–36)
MCV RBC AUTO: 98 FL (ref 82–98)
MONOCYTES # BLD AUTO: 0.7 K/UL (ref 0.3–1)
MONOCYTES NFR BLD: 8 % (ref 4–15)
NEUTROPHILS # BLD AUTO: 6.6 K/UL (ref 1.8–7.7)
NEUTROPHILS NFR BLD: 74.2 % (ref 38–73)
NRBC BLD-RTO: 0 /100 WBC
PHOSPHATE SERPL-MCNC: 3.6 MG/DL (ref 2.7–4.5)
PLATELET # BLD AUTO: 216 K/UL (ref 150–450)
PMV BLD AUTO: 10.8 FL (ref 9.2–12.9)
RBC # BLD AUTO: 3.46 M/UL (ref 4.6–6.2)
WBC # BLD AUTO: 8.86 K/UL (ref 3.9–12.7)

## 2024-01-02 PROCEDURE — 25000003 PHARM REV CODE 250: Performed by: STUDENT IN AN ORGANIZED HEALTH CARE EDUCATION/TRAINING PROGRAM

## 2024-01-02 PROCEDURE — 85025 COMPLETE CBC W/AUTO DIFF WBC: CPT | Performed by: STUDENT IN AN ORGANIZED HEALTH CARE EDUCATION/TRAINING PROGRAM

## 2024-01-02 PROCEDURE — 97535 SELF CARE MNGMENT TRAINING: CPT

## 2024-01-02 PROCEDURE — 97530 THERAPEUTIC ACTIVITIES: CPT | Mod: CQ

## 2024-01-02 PROCEDURE — 63600175 PHARM REV CODE 636 W HCPCS: Performed by: STUDENT IN AN ORGANIZED HEALTH CARE EDUCATION/TRAINING PROGRAM

## 2024-01-02 PROCEDURE — 25000003 PHARM REV CODE 250

## 2024-01-02 PROCEDURE — 83735 ASSAY OF MAGNESIUM: CPT

## 2024-01-02 PROCEDURE — 36415 COLL VENOUS BLD VENIPUNCTURE: CPT

## 2024-01-02 PROCEDURE — 84100 ASSAY OF PHOSPHORUS: CPT

## 2024-01-02 PROCEDURE — 20600001 HC STEP DOWN PRIVATE ROOM

## 2024-01-02 RX ADMIN — ATORVASTATIN CALCIUM 80 MG: 40 TABLET, FILM COATED ORAL at 11:01

## 2024-01-02 RX ADMIN — CEFAZOLIN 2 G: 2 INJECTION, POWDER, FOR SOLUTION INTRAMUSCULAR; INTRAVENOUS at 02:01

## 2024-01-02 RX ADMIN — ACETAMINOPHEN 1000 MG: 325 TABLET ORAL at 08:01

## 2024-01-02 RX ADMIN — HEPARIN SODIUM 5000 UNITS: 5000 INJECTION INTRAVENOUS; SUBCUTANEOUS at 06:01

## 2024-01-02 RX ADMIN — PANTOPRAZOLE SODIUM 40 MG: 40 TABLET, DELAYED RELEASE ORAL at 06:01

## 2024-01-02 RX ADMIN — OXYCODONE HYDROCHLORIDE 5 MG: 5 TABLET ORAL at 03:01

## 2024-01-02 RX ADMIN — CARVEDILOL 6.25 MG: 6.25 TABLET, FILM COATED ORAL at 06:01

## 2024-01-02 RX ADMIN — OXYCODONE HYDROCHLORIDE 5 MG: 5 TABLET ORAL at 11:01

## 2024-01-02 RX ADMIN — QUETIAPINE FUMARATE 25 MG: 25 TABLET ORAL at 08:01

## 2024-01-02 RX ADMIN — CEFAZOLIN 2 G: 2 INJECTION, POWDER, FOR SOLUTION INTRAMUSCULAR; INTRAVENOUS at 10:01

## 2024-01-02 RX ADMIN — CEFAZOLIN 2 G: 2 INJECTION, POWDER, FOR SOLUTION INTRAMUSCULAR; INTRAVENOUS at 06:01

## 2024-01-02 RX ADMIN — HEPARIN SODIUM 5000 UNITS: 5000 INJECTION INTRAVENOUS; SUBCUTANEOUS at 09:01

## 2024-01-02 RX ADMIN — DIVALPROEX SODIUM 125 MG: 125 TABLET, DELAYED RELEASE ORAL at 02:01

## 2024-01-02 RX ADMIN — CARVEDILOL 6.25 MG: 6.25 TABLET, FILM COATED ORAL at 08:01

## 2024-01-02 RX ADMIN — ACETAMINOPHEN 1000 MG: 325 TABLET ORAL at 02:01

## 2024-01-02 RX ADMIN — PREGABALIN 100 MG: 50 CAPSULE ORAL at 09:01

## 2024-01-02 RX ADMIN — OXYCODONE HYDROCHLORIDE 5 MG: 5 TABLET ORAL at 07:01

## 2024-01-02 RX ADMIN — ASPIRIN 81 MG CHEWABLE TABLET 81 MG: 81 TABLET CHEWABLE at 08:01

## 2024-01-02 RX ADMIN — PREGABALIN 100 MG: 50 CAPSULE ORAL at 02:01

## 2024-01-02 RX ADMIN — HEPARIN SODIUM 5000 UNITS: 5000 INJECTION INTRAVENOUS; SUBCUTANEOUS at 02:01

## 2024-01-02 RX ADMIN — METHOCARBAMOL 1000 MG: 500 TABLET ORAL at 06:01

## 2024-01-02 RX ADMIN — DIVALPROEX SODIUM 125 MG: 125 TABLET, DELAYED RELEASE ORAL at 08:01

## 2024-01-02 RX ADMIN — METHOCARBAMOL 1000 MG: 500 TABLET ORAL at 11:01

## 2024-01-02 RX ADMIN — PREGABALIN 100 MG: 50 CAPSULE ORAL at 06:01

## 2024-01-02 NOTE — SUBJECTIVE & OBJECTIVE
"  Medications:  Continuous Infusions:  Scheduled Meds:   acetaminophen  1,000 mg Oral TID    aspirin  81 mg Oral Daily    atorvastatin  80 mg Oral Daily    carvediloL  6.25 mg Oral BID WM    ceFAZolin (ANCEF) IVPB  2 g Intravenous Q8H    divalproex  125 mg Oral TID    heparin (porcine)  5,000 Units Subcutaneous Q8H    methocarbamoL  1,000 mg Oral Q6H    pantoprazole  40 mg Oral Daily    pregabalin  100 mg Oral Q8H    QUEtiapine  25 mg Oral QHS     PRN Meds:hydrALAZINE, hydrOXYzine, labetalol, melatonin, ondansetron, oxyCODONE **AND** [DISCONTINUED] oxyCODONE, promethazine, sodium chloride 0.9%     Objective:     Vital Signs (Most Recent):  Temp: 98.1 °F (36.7 °C) (01/01/24 2346)  Pulse: 62 (01/01/24 2346)  Resp: 18 (01/02/24 0718)  BP: (!) 166/72 (01/01/24 2346)  SpO2: (!) 94 % (01/01/24 2346) Vital Signs (24h Range):  Temp:  [98.1 °F (36.7 °C)-98.3 °F (36.8 °C)] 98.1 °F (36.7 °C)  Pulse:  [62-71] 62  Resp:  [17-18] 18  SpO2:  [90 %-98 %] 94 %  BP: (144-166)/(60-72) 166/72          Physical Exam  Vitals reviewed.   Constitutional:       Appearance: Normal appearance.   Cardiovascular:      Rate and Rhythm: Normal rate and regular rhythm.   Pulmonary:      Effort: Pulmonary effort is normal. No respiratory distress.   Abdominal:      Palpations: Abdomen is soft.      Tenderness: There is no abdominal tenderness.   Musculoskeletal:      Comments: S/p left AKA. Incision healing appropriately. Re dressed with gauze and ace wrap.    Skin:     General: Skin is warm.   Neurological:      General: No focal deficit present.      Mental Status: He is alert and oriented to person, place, and time.          Significant Labs:  CBC:   Recent Labs   Lab 01/01/24  0425   WBC 8.30   RBC 3.28*   HGB 10.1*   HCT 31.6*      MCV 96   MCH 30.8   MCHC 32.0     CMP: No results for input(s): "GLU", "CALCIUM", "ALBUMIN", "PROT", "NA", "K", "CO2", "CL", "BUN", "CREATININE", "ALKPHOS", "ALT", "AST", "BILITOT" in the last 48 " hours.    Significant Diagnostics:  I have reviewed all pertinent imaging results/findings within the past 24 hours.

## 2024-01-02 NOTE — ASSESSMENT & PLAN NOTE
63 M with long history of PAD, s/p SFA to above the knee popliteal artery bypass, current smoker, presents as a transfer due to concern for decreased sensation with concern for rest pain secondary to occluded bypass graft now s/p angiogram, thrombolysis, and posterior tibial artery exploration. Unfortunately, no good options for revascularization of this patients left leg. After long discussion with patient this morning he is amenable to that surgery. Now s/p left AKA on 12/28. Stump healing appropriately.    - Continue aspirin, statin  - PNC pulled yesterday   - MM PO/IV pain control   - Replace dressing as needed  - Asa, statin     Dispo: Patient will need SNF early next week, appreciate SW assistance

## 2024-01-02 NOTE — PT/OT/SLP PROGRESS
"Occupational Therapy   Treatment    Name: Jamari Huerta III  MRN: [id number]  Admitting Diagnosis:  Thrombosis of femoro-popliteal bypass graft  5 Days Post-Op    Recommendations:     Discharge Recommendations: Moderate Intensity Therapy  Discharge Equipment Recommendations:  wheelchair, bath bench  Barriers to discharge:  Decreased caregiver support    Assessment:   Co-tx c/ PTA for pt safety and max participation      Jamari Huerta III is a 63 y.o. male with a medical diagnosis of Thrombosis of femoro-popliteal bypass graft.  He presents with performance deficits affecting function: weakness, impaired endurance, impaired self care skills, impaired functional mobility, gait instability, decreased upper extremity function, decreased lower extremity function, impaired balance, decreased safety awareness, pain. Pt appeared more agitated and impulsive today which can pose a safety issue.     Rehab Prognosis:  Fair; patient would benefit from acute skilled OT services to address these deficits and reach maximum level of function.       Plan:     Patient to be seen 4 x/week to address the above listed problems via self-care/home management, therapeutic activities, therapeutic exercises, neuromuscular re-education  Plan of Care Expires: 01/28/24  Plan of Care Reviewed with: patient    Subjective     Chief Complaint: "I need to make a bowel movement"  Patient/Family Comments/goals:  transition to next level of care  Pain/Comfort:  Pain Rating 1: 0/10    Objective:     Communicated with: Nsg prior to session.  Patient found supine with bed alarm upon OT entry to room.    General Precautions: Standard, fall    Orthopedic Precautions:LLE non weight bearing  Braces: N/A  Respiratory Status: Room air     Occupational Performance:     Bed Mobility:    Patient completed Supine to Sit with minimum assistance  Patient completed Sit to Supine with stand by assistance     Functional Mobility/Transfers:  Patient completed BSC Transfer " Squat Pivot technique with moderate assistance and of 2 persons with  no AD      Activities of Daily Living:  Toileting: stand by assistance pt urinated in BSC while seated.    Therapeutic Activity  Pt performed 10 table wipes c/ AAROM of L UE to promote muscle activation and weightbearing     [facility] 6 Click ADL: 17    Treatment & Education:  Pt educated on role and purpose of therapy  Pt educated on neuromuscular activities  Pt educated on benefits of OOB activity  Pt educated on self advocacy      Patient left HOB elevated with all lines intact, call button in reach, and nsg notified    GOALS:   Multidisciplinary Problems       Occupational Therapy Goals          Problem: Occupational Therapy    Goal Priority Disciplines Outcome Interventions   Occupational Therapy Goal     OT, PT/OT Ongoing, Progressing    Description: Goals to be met by: 1/28/2024     Patient will increase functional independence with ADLs by performing:    LE Dressing with Stand-by Assistance. Ongoing   Grooming while standing at sink with Set-up Assistance and Supervision.Ongoing   Toileting from toilet with Supervision for hygiene and clothing management. Ongoing   Toilet transfer to toilet with Stand-by Assistance.Ongoing                          Time Tracking:     OT Date of Treatment: 01/02/24  OT Start Time: 1226  OT Stop Time: 1243  OT Total Time (min): 17 min    Billable Minutes:Self Care/Home Management 17    OT/BRUCE: OT          1/2/2024

## 2024-01-02 NOTE — PLAN OF CARE
[facility]  Discharge Reassessment    Primary Care Provider: Nataliya Anderson MD    Expected Discharge Date: 1/3/2024    Reassessment (most recent)       Discharge Reassessment - 01/02/24 1136          Discharge Reassessment    Assessment Type Discharge Planning Reassessment     Did the patient's condition or plan change since previous assessment? No     Discharge Plan discussed with: Patient     Communicated MYLENE with patient/caregiver Yes     Discharge Plan A Skilled Nursing Facility     Discharge Plan B Home Health     Transition of Care Barriers None     Why the patient remains in the hospital Requires continued medical care                     [facility] in [place] will submit for Auth today.     Patient and patient's sister are agreeable.

## 2024-01-02 NOTE — PROGRESS NOTES
[facility]  Vascular Surgery  Progress Note    Patient Name: Jamari Huerta III  MRN: [id number]  Admission Date: 12/21/2023  Primary Care Provider: Nataliya Anderson MD    Subjective:     Interval History: NAEON, patient feeling well, tolerated dressing change this morning.  He is medically ready for discharge, pending SNF placement.     Post-Op Info:  Procedure(s) (LRB):  AMPUTATION, ABOVE KNEE (Left)   5 Days Post-Op     Medications:  Continuous Infusions:  Scheduled Meds:   acetaminophen  1,000 mg Oral TID    aspirin  81 mg Oral Daily    atorvastatin  80 mg Oral Daily    carvediloL  6.25 mg Oral BID WM    ceFAZolin (ANCEF) IVPB  2 g Intravenous Q8H    divalproex  125 mg Oral TID    heparin (porcine)  5,000 Units Subcutaneous Q8H    methocarbamoL  1,000 mg Oral Q6H    pantoprazole  40 mg Oral Daily    pregabalin  100 mg Oral Q8H    QUEtiapine  25 mg Oral QHS     PRN Meds:hydrALAZINE, hydrOXYzine, labetalol, melatonin, ondansetron, oxyCODONE **AND** [DISCONTINUED] oxyCODONE, promethazine, sodium chloride 0.9%     Objective:     Vital Signs (Most Recent):  Temp: 98.1 °F (36.7 °C) (01/01/24 2346)  Pulse: 62 (01/01/24 2346)  Resp: 18 (01/02/24 0718)  BP: (!) 166/72 (01/01/24 2346)  SpO2: (!) 94 % (01/01/24 2346) Vital Signs (24h Range):  Temp:  [98.1 °F (36.7 °C)-98.3 °F (36.8 °C)] 98.1 °F (36.7 °C)  Pulse:  [62-71] 62  Resp:  [17-18] 18  SpO2:  [90 %-98 %] 94 %  BP: (144-166)/(60-72) 166/72          Physical Exam  Vitals reviewed.   Constitutional:       Appearance: Normal appearance.   Cardiovascular:      Rate and Rhythm: Normal rate and regular rhythm.   Pulmonary:      Effort: Pulmonary effort is normal. No respiratory distress.   Abdominal:      Palpations: Abdomen is soft.      Tenderness: There is no abdominal tenderness.   Musculoskeletal:      Comments: S/p left AKA. Incision healing appropriately. Re dressed with gauze and ace wrap.    Skin:     General: Skin is warm.   Neurological:      General: No  "focal deficit present.      Mental Status: He is alert and oriented to person, place, and time.          Significant Labs:  CBC:   Recent Labs   Lab 01/01/24  0425   WBC 8.30   RBC 3.28*   HGB 10.1*   HCT 31.6*      MCV 96   MCH 30.8   MCHC 32.0     CMP: No results for input(s): "GLU", "CALCIUM", "ALBUMIN", "PROT", "NA", "K", "CO2", "CL", "BUN", "CREATININE", "ALKPHOS", "ALT", "AST", "BILITOT" in the last 48 hours.    Significant Diagnostics:  I have reviewed all pertinent imaging results/findings within the past 24 hours.  Assessment/Plan:     * Thrombosis of femoro-popliteal bypass graft  63 M with long history of PAD, s/p SFA to above the knee popliteal artery bypass, current smoker, presents as a transfer due to concern for decreased sensation with concern for rest pain secondary to occluded bypass graft now s/p angiogram, thrombolysis, and posterior tibial artery exploration. Unfortunately, no good options for revascularization of this patients left leg. After long discussion with patient this morning he is amenable to that surgery. Now s/p left AKA on 12/28. Stump healing appropriately.    - Continue aspirin, statin  - PNC pulled yesterday   - MM PO/IV pain control   - Replace dressing as needed  - Asa, statin     Dispo: Patient will need SNF today, appreciate SW assistance         Sarah Condon NP  Vascular Surgery  [facility]  "

## 2024-01-02 NOTE — PT/OT/SLP PROGRESS
Physical Therapy Treatment    Patient Name:  Jamari Huerta III   MRN:  [id number]    Recommendations:     Discharge Recommendations: Moderate Intensity Therapy  Discharge Equipment Recommendations: wheelchair, bath bench  Barriers to discharge: Decreased caregiver support    Assessment:     Jamari Huerta III is a 63 y.o. male admitted with a medical diagnosis of Thrombosis of femoro-popliteal bypass graft.  He presents with the following impairments/functional limitations: weakness, impaired self care skills, impaired functional mobility, impaired endurance, gait instability, impaired cognition, decreased upper extremity function, decreased lower extremity function, decreased coordination, decreased safety awareness, pain, decreased ROM, impaired skin, edema, impaired cardiopulmonary response to activity, impaired fine motor, orthopedic precautions .patient has decreased  safety awareness, moves impulsively with poor motor planning. Patient remains appropriate for continued skilled services within the acute environment and goals remain appropriate.     Rehab Prognosis: Good; patient would benefit from acute skilled PT services to address these deficits and reach maximum level of function.    Recent Surgery: Procedure(s) (LRB):  AMPUTATION, ABOVE KNEE (Left) 5 Days Post-Op    Plan:     During this hospitalization, patient to be seen 4 x/week to address the identified rehab impairments via gait training, therapeutic activities, therapeutic exercises, neuromuscular re-education and progress toward the following goals:    Plan of Care Expires:  01/28/24    Subjective     Chief Complaint: I am going to try and go to the bathroom  Pain/Comfort:  Pain Rating 1: 0/10  Pain Rating Post-Intervention 1: 0/10      Objective:     Communicated with RN prior to session.  Patient found  seated with RN and OT present  with bed alarm upon PT entry to room.     General Precautions: Standard, fall  Orthopedic Precautions: LLE non weight  bearing  Braces: N/A  Respiratory Status: Room air     Functional Mobility:  Bed Mobility:     Sit to Supine: minimum assistance  Transfers:     Sit to Stand:  moderate assistance with no AD and hand-held assist  Toilet Transfer: maximal assistance with  R hand-held assist  using  Squat Pivot      AM-PAC 6 CLICK MOBILITY  Turning over in bed (including adjusting bedclothes, sheets and blankets)?: 3  Sitting down on and standing up from a chair with arms (e.g., wheelchair, bedside commode, etc.): 3  Moving from lying on back to sitting on the side of the bed?: 4  Moving to and from a bed to a chair (including a wheelchair)?: 2  Need to walk in hospital room?: 1  Climbing 3-5 steps with a railing?: 1  Basic Mobility Total Score: 14       Treatment & Education:  Therapist provided instruction and educated of  patient on progress, safety,d/c,PT POC,   proper body mechanics, energy conservation, and fall prevention strategies during tasks listed above, on the effects of prolonged immobility and the importance of performing OOB activity and exercises to promote healing and reduce recovery time   seated R LAQ and L LE AAROM hip flexion and hip abd  Updated white board with appropriate PT mobility information for medical team notification   Bedside table in front of patient and area set up for function, convenience, and safety. RN aware of patient's mobility needs and status. Questions/concerns addressed within PTA scope of practice; patient  with no further questions. Time was provided for active listening, discussion of health disposition, and discussion of safe discharge. Pt?verbalized?agreement   Co-treatment performed with OT due to patient's complexity and benefit of 2 skilled therapists to facilitate functional and safe performance, accommodate patient's activity tolerance, and maximize patient's participation in therapy.    Patient left HOB elevated with all lines intact, call button in reach, and nsg  notified..    GOALS:   Multidisciplinary Problems       Physical Therapy Goals          Problem: Physical Therapy    Goal Priority Disciplines Outcome Goal Variances Interventions   Physical Therapy Goal     PT, PT/OT Ongoing, Progressing     Description: Goals to be met by: 1/10     Patient will increase functional independence with mobility by performin. Supine to sit with Modified [place]  2. Sit to supine with Modified [place]  3. Sit to stand transfer with Contact Guard Assistance  4. Bed to chair transfer with Stand-by Assistance using squat pivot technique.   5. Gait  x 3 feet with Moderate Assistance using LRAD.   6. Wheelchair propulsion x50 feet with contact guard Assistance using JUAN upper extremities and right leg.                          Time Tracking:     PT Received On: 24  PT Start Time: 1225     PT Stop Time: 1243  PT Total Time (min): 21 min     Billable Minutes: Therapeutic Activity 15    Treatment Type: Treatment  PT/PTA: PTA     Number of PTA visits since last PT visit: 2024

## 2024-01-02 NOTE — PLAN OF CARE
[facility] Plan of Care Note    Dx: Thrombosis of femoro-popliteal bypass graft    Shift Events: None    Goals of Care: Safety, pain control    Neuro: AAO; some deficits r/t prior CVA    Vital Signs: WDL    Respiratory: RA    Diet: Regular    Is patient tolerating current diet? Yes    GTTS: None    Urine Output/Bowel Movement: Per charting    Drains/Tubes/Tube Feeds (include total output/shift): None    Lines: PIV    Accuchecks: None    Skin: L AKA stump WDL    Fall Risk Score: Per charting    Activity level? Pivot to chair with x2 assist; otherwise, in-bed activities    Any scheduled procedures? None    Any safety concerns? Fall risk; previous fall this admission, impulsive; camera at bedside

## 2024-01-03 LAB
BASOPHILS # BLD AUTO: 0.07 K/UL (ref 0–0.2)
BASOPHILS NFR BLD: 0.8 % (ref 0–1.9)
DIFFERENTIAL METHOD BLD: ABNORMAL
EOSINOPHIL # BLD AUTO: 0.2 K/UL (ref 0–0.5)
EOSINOPHIL NFR BLD: 2.7 % (ref 0–8)
ERYTHROCYTE [DISTWIDTH] IN BLOOD BY AUTOMATED COUNT: 15.8 % (ref 11.5–14.5)
FINAL PATHOLOGIC DIAGNOSIS: NORMAL
GROSS: NORMAL
HCT VFR BLD AUTO: 32.2 % (ref 40–54)
HGB BLD-MCNC: 10.1 G/DL (ref 14–18)
IMM GRANULOCYTES # BLD AUTO: 0.17 K/UL (ref 0–0.04)
IMM GRANULOCYTES NFR BLD AUTO: 2 % (ref 0–0.5)
LYMPHOCYTES # BLD AUTO: 0.9 K/UL (ref 1–4.8)
LYMPHOCYTES NFR BLD: 10 % (ref 18–48)
Lab: NORMAL
MAGNESIUM SERPL-MCNC: 1.9 MG/DL (ref 1.6–2.6)
MCH RBC QN AUTO: 30.4 PG (ref 27–31)
MCHC RBC AUTO-ENTMCNC: 31.4 G/DL (ref 32–36)
MCV RBC AUTO: 97 FL (ref 82–98)
MICROSCOPIC EXAM: NORMAL
MONOCYTES # BLD AUTO: 0.6 K/UL (ref 0.3–1)
MONOCYTES NFR BLD: 7.2 % (ref 4–15)
NEUTROPHILS # BLD AUTO: 6.6 K/UL (ref 1.8–7.7)
NEUTROPHILS NFR BLD: 77.3 % (ref 38–73)
NRBC BLD-RTO: 0 /100 WBC
PHOSPHATE SERPL-MCNC: 3.5 MG/DL (ref 2.7–4.5)
PLATELET # BLD AUTO: 224 K/UL (ref 150–450)
PMV BLD AUTO: 10.5 FL (ref 9.2–12.9)
RBC # BLD AUTO: 3.32 M/UL (ref 4.6–6.2)
WBC # BLD AUTO: 8.53 K/UL (ref 3.9–12.7)

## 2024-01-03 PROCEDURE — 97112 NEUROMUSCULAR REEDUCATION: CPT

## 2024-01-03 PROCEDURE — 63600175 PHARM REV CODE 636 W HCPCS: Performed by: STUDENT IN AN ORGANIZED HEALTH CARE EDUCATION/TRAINING PROGRAM

## 2024-01-03 PROCEDURE — 25000003 PHARM REV CODE 250

## 2024-01-03 PROCEDURE — 84100 ASSAY OF PHOSPHORUS: CPT

## 2024-01-03 PROCEDURE — 25000003 PHARM REV CODE 250: Performed by: STUDENT IN AN ORGANIZED HEALTH CARE EDUCATION/TRAINING PROGRAM

## 2024-01-03 PROCEDURE — 97110 THERAPEUTIC EXERCISES: CPT

## 2024-01-03 PROCEDURE — 83735 ASSAY OF MAGNESIUM: CPT

## 2024-01-03 PROCEDURE — 20600001 HC STEP DOWN PRIVATE ROOM

## 2024-01-03 PROCEDURE — 36415 COLL VENOUS BLD VENIPUNCTURE: CPT

## 2024-01-03 PROCEDURE — 85025 COMPLETE CBC W/AUTO DIFF WBC: CPT | Performed by: STUDENT IN AN ORGANIZED HEALTH CARE EDUCATION/TRAINING PROGRAM

## 2024-01-03 PROCEDURE — 97530 THERAPEUTIC ACTIVITIES: CPT

## 2024-01-03 RX ADMIN — ATORVASTATIN CALCIUM 80 MG: 40 TABLET, FILM COATED ORAL at 08:01

## 2024-01-03 RX ADMIN — HYDROXYZINE HYDROCHLORIDE 25 MG: 25 TABLET ORAL at 10:01

## 2024-01-03 RX ADMIN — HEPARIN SODIUM 5000 UNITS: 5000 INJECTION INTRAVENOUS; SUBCUTANEOUS at 02:01

## 2024-01-03 RX ADMIN — OXYCODONE HYDROCHLORIDE 5 MG: 5 TABLET ORAL at 06:01

## 2024-01-03 RX ADMIN — OXYCODONE HYDROCHLORIDE 5 MG: 5 TABLET ORAL at 09:01

## 2024-01-03 RX ADMIN — ASPIRIN 81 MG CHEWABLE TABLET 81 MG: 81 TABLET CHEWABLE at 08:01

## 2024-01-03 RX ADMIN — METHOCARBAMOL 1000 MG: 500 TABLET ORAL at 12:01

## 2024-01-03 RX ADMIN — DIVALPROEX SODIUM 125 MG: 125 TABLET, DELAYED RELEASE ORAL at 02:01

## 2024-01-03 RX ADMIN — ACETAMINOPHEN 1000 MG: 325 TABLET ORAL at 09:01

## 2024-01-03 RX ADMIN — CARVEDILOL 6.25 MG: 6.25 TABLET, FILM COATED ORAL at 05:01

## 2024-01-03 RX ADMIN — CEFAZOLIN 2 G: 2 INJECTION, POWDER, FOR SOLUTION INTRAMUSCULAR; INTRAVENOUS at 06:01

## 2024-01-03 RX ADMIN — METHOCARBAMOL 1000 MG: 500 TABLET ORAL at 05:01

## 2024-01-03 RX ADMIN — ACETAMINOPHEN 1000 MG: 325 TABLET ORAL at 02:01

## 2024-01-03 RX ADMIN — OXYCODONE HYDROCHLORIDE 5 MG: 5 TABLET ORAL at 03:01

## 2024-01-03 RX ADMIN — CEFAZOLIN 2 G: 2 INJECTION, POWDER, FOR SOLUTION INTRAMUSCULAR; INTRAVENOUS at 11:01

## 2024-01-03 RX ADMIN — DIVALPROEX SODIUM 125 MG: 125 TABLET, DELAYED RELEASE ORAL at 09:01

## 2024-01-03 RX ADMIN — METHOCARBAMOL 1000 MG: 500 TABLET ORAL at 11:01

## 2024-01-03 RX ADMIN — ACETAMINOPHEN 1000 MG: 325 TABLET ORAL at 08:01

## 2024-01-03 RX ADMIN — PREGABALIN 100 MG: 50 CAPSULE ORAL at 02:01

## 2024-01-03 RX ADMIN — PREGABALIN 100 MG: 50 CAPSULE ORAL at 05:01

## 2024-01-03 RX ADMIN — OXYCODONE HYDROCHLORIDE 5 MG: 5 TABLET ORAL at 11:01

## 2024-01-03 RX ADMIN — QUETIAPINE FUMARATE 25 MG: 25 TABLET ORAL at 09:01

## 2024-01-03 RX ADMIN — PREGABALIN 100 MG: 50 CAPSULE ORAL at 09:01

## 2024-01-03 RX ADMIN — DIVALPROEX SODIUM 125 MG: 125 TABLET, DELAYED RELEASE ORAL at 08:01

## 2024-01-03 RX ADMIN — PANTOPRAZOLE SODIUM 40 MG: 40 TABLET, DELAYED RELEASE ORAL at 05:01

## 2024-01-03 RX ADMIN — CEFAZOLIN 2 G: 2 INJECTION, POWDER, FOR SOLUTION INTRAMUSCULAR; INTRAVENOUS at 02:01

## 2024-01-03 RX ADMIN — CARVEDILOL 6.25 MG: 6.25 TABLET, FILM COATED ORAL at 07:01

## 2024-01-03 RX ADMIN — HEPARIN SODIUM 5000 UNITS: 5000 INJECTION INTRAVENOUS; SUBCUTANEOUS at 05:01

## 2024-01-03 RX ADMIN — HYDROXYZINE HYDROCHLORIDE 25 MG: 25 TABLET ORAL at 03:01

## 2024-01-03 RX ADMIN — HEPARIN SODIUM 5000 UNITS: 5000 INJECTION INTRAVENOUS; SUBCUTANEOUS at 09:01

## 2024-01-03 NOTE — PT/OT/SLP PROGRESS
"Occupational Therapy   Treatment    Name: Jamari Huerta III  MRN: [id number]  Admitting Diagnosis:  Thrombosis of femoro-popliteal bypass graft  6 Days Post-Op    Recommendations:     Discharge Recommendations: Moderate Intensity Therapy  Discharge Equipment Recommendations:  wheelchair, bath bench  Barriers to discharge:  Decreased caregiver support    Assessment:     Jamari Huerta III is a 63 y.o. male with a medical diagnosis of Thrombosis of femoro-popliteal bypass graft.  He presents with the following Performance deficits affecting function: weakness, impaired endurance, impaired functional mobility, impaired balance, decreased coordination, decreased lower extremity function, decreased upper extremity function, decreased safety awareness, pain.     Rehab Prognosis:  Good; patient would benefit from acute skilled OT services to address these deficits and reach maximum level of function.       Plan:     Patient to be seen 4 x/week to address the above listed problems via self-care/home management, therapeutic activities, therapeutic exercises, neuromuscular re-education  Plan of Care Expires: 01/28/24  Plan of Care Reviewed with: patient    Subjective     Chief Complaint: "I want to get out of here"  Patient/Family Comments/goals: return home  Pain/Comfort:  Pain Rating 1: 0/10    Objective:     Communicated with: Nsg prior to session.  Patient found supine with bed alarm, telemetry upon OT entry to room.    General Precautions: Standard, fall    Orthopedic Precautions:LLE non weight bearing  Braces: N/A  Respiratory Status: Room air     Occupational Performance:     Bed Mobility:    Patient completed Supine to Sit with stand by assistance using bed rail    Activities of Daily Living:  Grooming: setup A for oral hygiene at EOB      Therapeutic Exercise  1 set- 10 B/L Hand press using yellow theraputty for weightbearing and neuromuscular reeducation for R UE    AMPAC 6 Click ADL: 18    Treatment & Education:  Pt " educated on role and purpose of therapy  Pt educated on benefits of therex  Pt educated on self advocacy     Patient left supine with all lines intact, call button in reach, and nsg notified    GOALS:   Multidisciplinary Problems       Occupational Therapy Goals          Problem: Occupational Therapy    Goal Priority Disciplines Outcome Interventions   Occupational Therapy Goal     OT, PT/OT Ongoing, Progressing    Description: Goals to be met by: 1/28/2024     Patient will increase functional independence with ADLs by performing:    LE Dressing with Stand-by Assistance. Ongoing   Grooming while standing at sink with Set-up Assistance and Supervision.Ongoing   Toileting from toilet with Supervision for hygiene and clothing management. Ongoing   Toilet transfer to toilet with Stand-by Assistance.Ongoing                          Time Tracking:     OT Date of Treatment: 01/03/24  OT Start Time: 0233  OT Stop Time: 0250  OT Total Time (min): 17 min    Billable Minutes:Therapeutic Exercise 17    OT/BRUCE: OT          1/3/2024

## 2024-01-03 NOTE — PLAN OF CARE
[facility]  Discharge Reassessment    Primary Care Provider: Nataliya Anderson MD    Expected Discharge Date: 1/3/2024    Reassessment (most recent)       Discharge Reassessment - 01/03/24 1324          Discharge Reassessment    Assessment Type Discharge Planning Reassessment     Did the patient's condition or plan change since previous assessment? No     Discharge Plan discussed with: Patient     Communicated MYLENE with patient/caregiver Yes     Discharge Plan A Skilled Nursing Facility     Discharge Plan B Home with family;Home Health     Transition of Care Barriers None     Why the patient remains in the hospital Requires continued medical care                     Patients insurance company requested updated notes for SNF.     Updated notes sent.

## 2024-01-03 NOTE — ASSESSMENT & PLAN NOTE
63 M with long history of PAD, s/p SFA to above the knee popliteal artery bypass, current smoker, presents as a transfer due to concern for decreased sensation with concern for rest pain secondary to occluded bypass graft now s/p angiogram, thrombolysis, and posterior tibial artery exploration. Unfortunately, no good options for revascularization of this patients left leg. After long discussion with patient this morning he is amenable to that surgery. Now s/p left AKA on 12/28. Stump healing appropriately.    - Continue aspirin, statin  - MM PO  - Replace dressing as needed  - Asa, statin   - Remind patient not to get out of bed without assistance   - OK for shower       Dispo: Patient will need SNF ASAP, pending auth at [employer] [facility]

## 2024-01-03 NOTE — PLAN OF CARE
POC reviewed with pt.  - VSS on RA, AAOx4  - SBP in 170s this shift  - Rolling in bed independently  - ACE wrap to LLE stump changed PRN  - Adequate urine output per urinal  - No BM overnight  - Regular diet tolerating well  - IV ABX  - Call light in reach, WCTM

## 2024-01-03 NOTE — PT/OT/SLP PROGRESS
"Physical Therapy Treatment    Patient Name:  Jamari Huerta III   MRN:  [id number]    Recommendations:     Discharge Recommendations: Moderate Intensity Therapy  Discharge Equipment Recommendations: wheelchair, bath bench  Barriers to discharge: Pt currently requiring increased level of assistance with mobility      Assessment:     Jamari Huerta III is a 63 y.o. male admitted with a medical diagnosis of Thrombosis of femoro-popliteal bypass graft.  He presents with the following impairments/functional limitations: weakness, impaired endurance, impaired functional mobility, impaired balance, decreased coordination, decreased lower extremity function, decreased upper extremity function, decreased safety awareness, pain.    Cooperative, motivated. Pt presented with impulsivity, distractibility, decreased safety awareness, phantom limb pain reported. Pt transferred to bedside commode with 1 person assist and tech present for safety; pt presented with decreased upright posture, R knee block required, decreased L LE proprioception (under handle requiring cues for set-up pre-transfer back to bed). Pt continues to benefit from skilled PT services while in house in order to address the aforementioned deficits.    Rehab Prognosis: Good; patient would benefit from acute skilled PT services to address these deficits and reach maximum level of function.    Recent Surgery: Procedure(s) (LRB):  AMPUTATION, ABOVE KNEE (Left) 6 Days Post-Op    Plan:     During this hospitalization, patient to be seen 4 x/week to address the identified rehab impairments via gait training, therapeutic activities, therapeutic exercises, neuromuscular re-education, wheelchair management/training and progress toward the following goals:    Plan of Care Expires:  01/28/24    Subjective     "Let my doctor know how well I did today"    Pain/Comfort:  Pain Rating 1:  (not rated)  Location - Side 1: Left  Location - Orientation 1: generalized  Location 1: " leg  Pain Addressed 1: Nurse notified, Distraction, Reposition      Objective:     Communicated with RN prior to session.  Patient found HOB elevated with bed alarm upon PT entry to room.     General Precautions: Standard, fall  Orthopedic Precautions: LLE non weight bearing  Braces: N/A  Respiratory Status: Room air     Functional Mobility:  Bed Mobility:     Rolling Right: supervision  Supine to Sit towards R: moderate assistance  Sit to Supine: contact guard assistance  Transfers:     Sit to Stand:  moderate assistance with no AD  Toilet Transfer: maximal assistance with  no AD  using  Squat Pivot with R knee block  Balance:   Good sitting balance  Poor standing balance, R knee block; decreased upright posture      AM-PAC 6 CLICK MOBILITY  Turning over in bed (including adjusting bedclothes, sheets and blankets)?: 4  Sitting down on and standing up from a chair with arms (e.g., wheelchair, bedside commode, etc.): 3  Moving from lying on back to sitting on the side of the bed?: 3  Moving to and from a bed to a chair (including a wheelchair)?: 2  Need to walk in hospital room?: 1  Climbing 3-5 steps with a railing?: 1  Basic Mobility Total Score: 14       Treatment & Education:  L LE therex: marching, LAQ, ankle pumps; 1 LOB towards L with decreased righting reacting noted  Anterior/posterior truncal excursion with B UE Wbing  Rudy Chao    Educated pt on PT role/POC  Educated pt on importance of OOB activity and daily ambulation   Pt educated on proper body mechanics, safety techniques, and energy conservation with PT facilitation and cueing throughout session   Pt verbalized understanding      Patient left HOB elevated with call button in reach, bed alarm on, RN notified, and rehab tech present..    GOALS:   Multidisciplinary Problems       Physical Therapy Goals          Problem: Physical Therapy    Goal Priority Disciplines Outcome Goal Variances Interventions   Physical Therapy Goal     PT, PT/OT  Ongoing, Progressing     Description: Goals to be met by: 1/10     Patient will increase functional independence with mobility by performin. Supine to sit with Modified [place]  2. Sit to supine with Modified [place]  3. Sit to stand transfer with Contact Guard Assistance  4. Bed to chair transfer with Stand-by Assistance using squat pivot technique.   5. Gait  x 3 feet with Moderate Assistance using LRAD.   6. Wheelchair propulsion x50 feet with contact guard Assistance using JUAN upper extremities and right leg.                          Time Tracking:     PT Received On: 24  PT Start Time: 944     PT Stop Time: 1014  PT Total Time (min): 30 min     Billable Minutes: Neuromuscular Re-education 30    Treatment Type: Treatment  PT/PTA: PT     Number of PTA visits since last PT visit: 2024

## 2024-01-03 NOTE — SUBJECTIVE & OBJECTIVE
"  Medications:  Continuous Infusions:  Scheduled Meds:   acetaminophen  1,000 mg Oral TID    aspirin  81 mg Oral Daily    atorvastatin  80 mg Oral Daily    carvediloL  6.25 mg Oral BID WM    ceFAZolin (ANCEF) IVPB  2 g Intravenous Q8H    divalproex  125 mg Oral TID    heparin (porcine)  5,000 Units Subcutaneous Q8H    methocarbamoL  1,000 mg Oral Q6H    pantoprazole  40 mg Oral Daily    pregabalin  100 mg Oral Q8H    QUEtiapine  25 mg Oral QHS     PRN Meds:hydrOXYzine, melatonin, ondansetron, oxyCODONE **AND** [DISCONTINUED] oxyCODONE, promethazine, sodium chloride 0.9%     Objective:     Vital Signs (Most Recent):  Temp: 98.4 °F (36.9 °C) (01/03/24 0826)  Pulse: 74 (01/03/24 0826)  Resp: 20 (01/03/24 0826)  BP: (!) 153/108 (01/03/24 0826)  SpO2: (!) 94 % (01/03/24 0826) Vital Signs (24h Range):  Temp:  [97.6 °F (36.4 °C)-98.6 °F (37 °C)] 98.4 °F (36.9 °C)  Pulse:  [71-79] 74  Resp:  [14-20] 20  SpO2:  [92 %-98 %] 94 %  BP: (140-182)/() 153/108          Physical Exam  Vitals reviewed.   Constitutional:       Appearance: Normal appearance.   Cardiovascular:      Rate and Rhythm: Normal rate and regular rhythm.   Pulmonary:      Effort: Pulmonary effort is normal. No respiratory distress.   Abdominal:      Palpations: Abdomen is soft.      Tenderness: There is no abdominal tenderness.   Musculoskeletal:      Comments: S/p left AKA. Incision healing appropriately. Re dressed with gauze and ace wrap.    Skin:     General: Skin is warm.   Neurological:      General: No focal deficit present.      Mental Status: He is alert and oriented to person, place, and time.          Significant Labs:  CBC:   Recent Labs   Lab 01/03/24  0458   WBC 8.53   RBC 3.32*   HGB 10.1*   HCT 32.2*      MCV 97   MCH 30.4   MCHC 31.4*     CMP: No results for input(s): "GLU", "CALCIUM", "ALBUMIN", "PROT", "NA", "K", "CO2", "CL", "BUN", "CREATININE", "ALKPHOS", "ALT", "AST", "BILITOT" in the last 48 hours.    Significant " Diagnostics:  I have reviewed all pertinent imaging results/findings within the past 24 hours.

## 2024-01-03 NOTE — PROGRESS NOTES
[facility]  Vascular Surgery  Progress Note    Patient Name: Jamari Huerta III  MRN: [id number]  Admission Date: 12/21/2023  Primary Care Provider: Nataliya Anderson MD    Subjective:     Interval History: NAEON, patient is ready for SNF ASAP, pending auth.  Patient is oriented X 4 and answers questions appropriately and with clear speech.  He states he will not attempt to leave bed without assistance.  Bed alarm is on and working.  No need for tele sitter, order Dc'd yesterday.  Transfer to SNF today once auth approved.     Post-Op Info:  Procedure(s) (LRB):  AMPUTATION, ABOVE KNEE (Left)   6 Days Post-Op     Medications:  Continuous Infusions:  Scheduled Meds:   acetaminophen  1,000 mg Oral TID    aspirin  81 mg Oral Daily    atorvastatin  80 mg Oral Daily    carvediloL  6.25 mg Oral BID WM    ceFAZolin (ANCEF) IVPB  2 g Intravenous Q8H    divalproex  125 mg Oral TID    heparin (porcine)  5,000 Units Subcutaneous Q8H    methocarbamoL  1,000 mg Oral Q6H    pantoprazole  40 mg Oral Daily    pregabalin  100 mg Oral Q8H    QUEtiapine  25 mg Oral QHS     PRN Meds:hydrOXYzine, melatonin, ondansetron, oxyCODONE **AND** [DISCONTINUED] oxyCODONE, promethazine, sodium chloride 0.9%     Objective:     Vital Signs (Most Recent):  Temp: 98.4 °F (36.9 °C) (01/03/24 0826)  Pulse: 74 (01/03/24 0826)  Resp: 20 (01/03/24 0826)  BP: (!) 153/108 (01/03/24 0826)  SpO2: (!) 94 % (01/03/24 0826) Vital Signs (24h Range):  Temp:  [97.6 °F (36.4 °C)-98.6 °F (37 °C)] 98.4 °F (36.9 °C)  Pulse:  [71-79] 74  Resp:  [14-20] 20  SpO2:  [92 %-98 %] 94 %  BP: (140-182)/() 153/108          Physical Exam  Vitals reviewed.   Constitutional:       Appearance: Normal appearance.   Cardiovascular:      Rate and Rhythm: Normal rate and regular rhythm.   Pulmonary:      Effort: Pulmonary effort is normal. No respiratory distress.   Abdominal:      Palpations: Abdomen is soft.      Tenderness: There is no abdominal tenderness.   Musculoskeletal:  "     Comments: S/p left AKA. Incision healing appropriately. Re dressed with gauze and ace wrap.    Skin:     General: Skin is warm.   Neurological:      General: No focal deficit present.      Mental Status: He is alert and oriented to person, place, and time.          Significant Labs:  CBC:   Recent Labs   Lab 01/03/24  0458   WBC 8.53   RBC 3.32*   HGB 10.1*   HCT 32.2*      MCV 97   MCH 30.4   MCHC 31.4*     CMP: No results for input(s): "GLU", "CALCIUM", "ALBUMIN", "PROT", "NA", "K", "CO2", "CL", "BUN", "CREATININE", "ALKPHOS", "ALT", "AST", "BILITOT" in the last 48 hours.    Significant Diagnostics:  I have reviewed all pertinent imaging results/findings within the past 24 hours.  Assessment/Plan:     * Thrombosis of femoro-popliteal bypass graft  63 M with long history of PAD, s/p SFA to above the knee popliteal artery bypass, current smoker, presents as a transfer due to concern for decreased sensation with concern for rest pain secondary to occluded bypass graft now s/p angiogram, thrombolysis, and posterior tibial artery exploration. Unfortunately, no good options for revascularization of this patients left leg. After long discussion with patient this morning he is amenable to that surgery. Now s/p left AKA on 12/28. Stump healing appropriately.    - Continue aspirin, statin  - MM PO  - Replace dressing as needed  - Asa, statin   - Remind patient not to get out of bed without assistance   - OK for shower       Dispo: Patient will need SNF ASAP, pending auth at [facility] kathya Condon NP  Vascular Surgery  [facility]  "

## 2024-01-04 LAB
BASOPHILS # BLD AUTO: 0.06 K/UL (ref 0–0.2)
BASOPHILS NFR BLD: 0.7 % (ref 0–1.9)
DIFFERENTIAL METHOD BLD: ABNORMAL
EOSINOPHIL # BLD AUTO: 0.2 K/UL (ref 0–0.5)
EOSINOPHIL NFR BLD: 2.7 % (ref 0–8)
ERYTHROCYTE [DISTWIDTH] IN BLOOD BY AUTOMATED COUNT: 16.5 % (ref 11.5–14.5)
HCT VFR BLD AUTO: 30.3 % (ref 40–54)
HGB BLD-MCNC: 9.7 G/DL (ref 14–18)
IMM GRANULOCYTES # BLD AUTO: 0.19 K/UL (ref 0–0.04)
IMM GRANULOCYTES NFR BLD AUTO: 2.2 % (ref 0–0.5)
LYMPHOCYTES # BLD AUTO: 1 K/UL (ref 1–4.8)
LYMPHOCYTES NFR BLD: 10.9 % (ref 18–48)
MAGNESIUM SERPL-MCNC: 2 MG/DL (ref 1.6–2.6)
MCH RBC QN AUTO: 31 PG (ref 27–31)
MCHC RBC AUTO-ENTMCNC: 32 G/DL (ref 32–36)
MCV RBC AUTO: 97 FL (ref 82–98)
MONOCYTES # BLD AUTO: 0.6 K/UL (ref 0.3–1)
MONOCYTES NFR BLD: 6.8 % (ref 4–15)
NEUTROPHILS # BLD AUTO: 6.8 K/UL (ref 1.8–7.7)
NEUTROPHILS NFR BLD: 76.7 % (ref 38–73)
NRBC BLD-RTO: 0 /100 WBC
PHOSPHATE SERPL-MCNC: 3.8 MG/DL (ref 2.7–4.5)
PLATELET # BLD AUTO: 246 K/UL (ref 150–450)
PMV BLD AUTO: 10.6 FL (ref 9.2–12.9)
RBC # BLD AUTO: 3.13 M/UL (ref 4.6–6.2)
WBC # BLD AUTO: 8.83 K/UL (ref 3.9–12.7)

## 2024-01-04 PROCEDURE — 25000003 PHARM REV CODE 250

## 2024-01-04 PROCEDURE — 97535 SELF CARE MNGMENT TRAINING: CPT

## 2024-01-04 PROCEDURE — 84100 ASSAY OF PHOSPHORUS: CPT

## 2024-01-04 PROCEDURE — 25000003 PHARM REV CODE 250: Performed by: STUDENT IN AN ORGANIZED HEALTH CARE EDUCATION/TRAINING PROGRAM

## 2024-01-04 PROCEDURE — 83735 ASSAY OF MAGNESIUM: CPT

## 2024-01-04 PROCEDURE — 97112 NEUROMUSCULAR REEDUCATION: CPT | Mod: CQ

## 2024-01-04 PROCEDURE — 20600001 HC STEP DOWN PRIVATE ROOM

## 2024-01-04 PROCEDURE — 63600175 PHARM REV CODE 636 W HCPCS: Performed by: STUDENT IN AN ORGANIZED HEALTH CARE EDUCATION/TRAINING PROGRAM

## 2024-01-04 PROCEDURE — 36415 COLL VENOUS BLD VENIPUNCTURE: CPT | Performed by: STUDENT IN AN ORGANIZED HEALTH CARE EDUCATION/TRAINING PROGRAM

## 2024-01-04 PROCEDURE — 85025 COMPLETE CBC W/AUTO DIFF WBC: CPT | Performed by: STUDENT IN AN ORGANIZED HEALTH CARE EDUCATION/TRAINING PROGRAM

## 2024-01-04 RX ORDER — DOCUSATE SODIUM 100 MG/1
100 CAPSULE, LIQUID FILLED ORAL 3 TIMES DAILY PRN
Qty: 21 CAPSULE | Refills: 0 | Status: SHIPPED | OUTPATIENT
Start: 2024-01-04

## 2024-01-04 RX ORDER — OXYCODONE HYDROCHLORIDE 5 MG/1
5 TABLET ORAL EVERY 6 HOURS PRN
Qty: 12 TABLET | Refills: 0 | Status: SHIPPED | OUTPATIENT
Start: 2024-01-04 | End: 2024-01-05

## 2024-01-04 RX ORDER — METHOCARBAMOL 500 MG/1
1000 TABLET, FILM COATED ORAL EVERY 6 HOURS
Qty: 56 TABLET | Refills: 0 | Status: SHIPPED | OUTPATIENT
Start: 2024-01-04 | End: 2024-01-14

## 2024-01-04 RX ORDER — ACETAMINOPHEN 500 MG
1000 TABLET ORAL EVERY 8 HOURS PRN
Qty: 30 TABLET | Refills: 0 | Status: SHIPPED | OUTPATIENT
Start: 2024-01-04

## 2024-01-04 RX ADMIN — OXYCODONE HYDROCHLORIDE 5 MG: 5 TABLET ORAL at 09:01

## 2024-01-04 RX ADMIN — QUETIAPINE FUMARATE 25 MG: 25 TABLET ORAL at 09:01

## 2024-01-04 RX ADMIN — OXYCODONE HYDROCHLORIDE 5 MG: 5 TABLET ORAL at 03:01

## 2024-01-04 RX ADMIN — CARVEDILOL 6.25 MG: 6.25 TABLET, FILM COATED ORAL at 06:01

## 2024-01-04 RX ADMIN — ACETAMINOPHEN 1000 MG: 325 TABLET ORAL at 03:01

## 2024-01-04 RX ADMIN — DIVALPROEX SODIUM 125 MG: 125 TABLET, DELAYED RELEASE ORAL at 09:01

## 2024-01-04 RX ADMIN — HEPARIN SODIUM 5000 UNITS: 5000 INJECTION INTRAVENOUS; SUBCUTANEOUS at 05:01

## 2024-01-04 RX ADMIN — METHOCARBAMOL 1000 MG: 500 TABLET ORAL at 01:01

## 2024-01-04 RX ADMIN — PREGABALIN 100 MG: 50 CAPSULE ORAL at 03:01

## 2024-01-04 RX ADMIN — HEPARIN SODIUM 5000 UNITS: 5000 INJECTION INTRAVENOUS; SUBCUTANEOUS at 09:01

## 2024-01-04 RX ADMIN — PREGABALIN 100 MG: 50 CAPSULE ORAL at 09:01

## 2024-01-04 RX ADMIN — METHOCARBAMOL 1000 MG: 500 TABLET ORAL at 05:01

## 2024-01-04 RX ADMIN — CARVEDILOL 6.25 MG: 6.25 TABLET, FILM COATED ORAL at 09:01

## 2024-01-04 RX ADMIN — HYDROXYZINE HYDROCHLORIDE 25 MG: 25 TABLET ORAL at 09:01

## 2024-01-04 RX ADMIN — ACETAMINOPHEN 1000 MG: 325 TABLET ORAL at 09:01

## 2024-01-04 RX ADMIN — DIVALPROEX SODIUM 125 MG: 125 TABLET, DELAYED RELEASE ORAL at 03:01

## 2024-01-04 RX ADMIN — OXYCODONE HYDROCHLORIDE 5 MG: 5 TABLET ORAL at 01:01

## 2024-01-04 RX ADMIN — HEPARIN SODIUM 5000 UNITS: 5000 INJECTION INTRAVENOUS; SUBCUTANEOUS at 03:01

## 2024-01-04 RX ADMIN — METHOCARBAMOL 1000 MG: 500 TABLET ORAL at 06:01

## 2024-01-04 RX ADMIN — CEFAZOLIN 2 G: 2 INJECTION, POWDER, FOR SOLUTION INTRAMUSCULAR; INTRAVENOUS at 11:01

## 2024-01-04 RX ADMIN — CEFAZOLIN 2 G: 2 INJECTION, POWDER, FOR SOLUTION INTRAMUSCULAR; INTRAVENOUS at 06:01

## 2024-01-04 RX ADMIN — Medication 6 MG: at 09:01

## 2024-01-04 RX ADMIN — PREGABALIN 100 MG: 50 CAPSULE ORAL at 05:01

## 2024-01-04 RX ADMIN — CEFAZOLIN 2 G: 2 INJECTION, POWDER, FOR SOLUTION INTRAMUSCULAR; INTRAVENOUS at 03:01

## 2024-01-04 RX ADMIN — OXYCODONE HYDROCHLORIDE 5 MG: 5 TABLET ORAL at 06:01

## 2024-01-04 RX ADMIN — PANTOPRAZOLE SODIUM 40 MG: 40 TABLET, DELAYED RELEASE ORAL at 06:01

## 2024-01-04 RX ADMIN — ASPIRIN 81 MG CHEWABLE TABLET 81 MG: 81 TABLET CHEWABLE at 09:01

## 2024-01-04 RX ADMIN — OXYCODONE HYDROCHLORIDE 5 MG: 5 TABLET ORAL at 05:01

## 2024-01-04 RX ADMIN — ATORVASTATIN CALCIUM 80 MG: 40 TABLET, FILM COATED ORAL at 09:01

## 2024-01-04 NOTE — PLAN OF CARE
[facility]  Discharge Final Note    Primary Care Provider: Nataliya Anderson MD  Expected Discharge Date: 1/4/2024    Patient medically ready for discharge to [facility] in [place], [place]     Patient left on the evening of 1/5/2024.     Transportation by Ambulance    Is family/patient aware of discharge: yes     Hospital follow up scheduled: yes       Final Discharge Note (most recent)       Final Note - 01/04/24 1346          Final Note    Assessment Type Final Discharge Note     Anticipated Discharge Disposition Skilled Nursing Facility     Hospital Resources/Appts/Education Provided Provided patient/caregiver with written discharge plan information                     Important Message from Medicare         Referral Info (most recent)       Referral Info    No documentation.                 Contact Info       NATALIE Mitchell II, MD   Specialty: Vascular Surgery    [facility] [address]   Phone: [phone]       Next Steps: Follow up in 3 week(s)          Future Appointments   Date Time Provider Department Center   1/5/2024 10:30 AM ULTRASOUND TESTING 1, [place] [facility] [place] [facility]   2/28/2024 10:40 AM Nataliya Anderson MD [facility] [place]   5/16/2024 12:15 PM Isamar Mei NP [facility] CARD [facility] [place]   6/25/2024  3:00 PM Olivia Quinn FNP [facility] NEURO [place]     Minor Sullivan RN  Case Management  Ext: [phone]  01/04/2024

## 2024-01-04 NOTE — PT/OT/SLP PROGRESS
Occupational Therapy  Co Treatment  Co-treatment performed due to patient's multiple deficits requiring two skilled therapists to safely facilitate functional tasks concurrently with functional mobility in addition to accommodating for patient's activity tolerance.    Name: Jamari Huerta III  MRN: [id number]  Admitting Diagnosis:  Thrombosis of femoro-popliteal bypass graft  7 Days Post-Op    Recommendations:     Discharge Recommendations: Moderate Intensity Therapy  Discharge Equipment Recommendations:  wheelchair, bath bench  Barriers to discharge:       Assessment:     Jamari Huerta III is a 63 y.o. male with a medical diagnosis of Thrombosis of femoro-popliteal bypass graft.  He presents with increased assist req for toilet T/F, sit<>stand T/F. Pt unable to use urinal in standing this date 2/2 decreased act anastacio standing EOB c/ MaxA x2 assist. Performance deficits affecting function are weakness, impaired endurance, impaired functional mobility, impaired balance, decreased coordination, decreased lower extremity function, decreased upper extremity function, decreased safety awareness, pain.     Rehab Prognosis:  Fair; patient would benefit from acute skilled OT services to address these deficits and reach maximum level of function.       Plan:     Patient to be seen 4 x/week to address the above listed problems via self-care/home management, therapeutic activities, therapeutic exercises, neuromuscular re-education  Plan of Care Expires: 01/28/24  Plan of Care Reviewed with: patient    Subjective     Chief Complaint: unable to urinate seated or supine  Patient/Family Comments/goals:   Pain/Comfort:  Pain Rating 1: 8/10  Location - Side 1: Left  Location - Orientation 1: generalized  Location 1: leg  Pain Addressed 1: Cessation of Activity, Distraction, Reposition    Objective:     Communicated with: nurse and PT prior to session.  Patient found  seated on BSC  with telemetry upon OT entry to room.    General  Precautions: Standard, fall    Orthopedic Precautions:LLE non weight bearing  Braces: N/A  Respiratory Status: Room air     Occupational Performance:     Bed Mobility:    Patient completed Sit to Supine with minimum assistance     Functional Mobility/Transfers:  Patient completed Sit <> Stand Transfer with maximal assistance and of 2 persons  with  rolling walker   Patient completed Toilet Transfer Squat Pivot technique with maximal assistance with  no AD  Pt c/ 3 standing trials each c/ maxA x2 and stood ~30seconds for 2 trials.     Activities of Daily Living:  Lower Body Dressing: moderate assistance assist threading foot into opening      AMPAC 6 Click ADL: 17    Treatment & Education:  Role of OT and POC  Call light use for needs  ADL retraining  Functional mobility training  Safety  Importance EOB/OOB activity    Patient left supine with all lines intact, call button in reach, and nurse notified    GOALS:   Multidisciplinary Problems       Occupational Therapy Goals          Problem: Occupational Therapy    Goal Priority Disciplines Outcome Interventions   Occupational Therapy Goal     OT, PT/OT Ongoing, Progressing    Description: Goals to be met by: 1/28/2024     Patient will increase functional independence with ADLs by performing:    LE Dressing with Stand-by Assistance. Ongoing   Grooming while standing at sink with Set-up Assistance and Supervision.Ongoing   Toileting from toilet with Supervision for hygiene and clothing management. Ongoing   Toilet transfer to toilet with Stand-by Assistance.Ongoing                          Time Tracking:     OT Date of Treatment: 01/04/24  OT Start Time: 1027  OT Stop Time: 1044  OT Total Time (min): 17 min    Billable Minutes:Self Care/Home Management 17    OT/BRUCE: OT          1/4/2024

## 2024-01-04 NOTE — SUBJECTIVE & OBJECTIVE
"  Medications:  Continuous Infusions:  Scheduled Meds:   acetaminophen  1,000 mg Oral TID    aspirin  81 mg Oral Daily    atorvastatin  80 mg Oral Daily    carvediloL  6.25 mg Oral BID WM    ceFAZolin (ANCEF) IVPB  2 g Intravenous Q8H    divalproex  125 mg Oral TID    heparin (porcine)  5,000 Units Subcutaneous Q8H    methocarbamoL  1,000 mg Oral Q6H    pantoprazole  40 mg Oral Daily    pregabalin  100 mg Oral Q8H    QUEtiapine  25 mg Oral QHS     PRN Meds:hydrOXYzine, melatonin, ondansetron, oxyCODONE **AND** [DISCONTINUED] oxyCODONE, promethazine, sodium chloride 0.9%     Objective:     Vital Signs (Most Recent):  Temp: 98.6 °F (37 °C) (01/04/24 0503)  Pulse: 71 (01/04/24 0503)  Resp: 19 (01/04/24 0515)  BP: (!) 169/74 (01/04/24 0503)  SpO2: (!) 92 % (01/04/24 0503) Vital Signs (24h Range):  Temp:  [97.4 °F (36.3 °C)-98.6 °F (37 °C)] 98.6 °F (37 °C)  Pulse:  [64-74] 71  Resp:  [18-20] 19  SpO2:  [92 %-97 %] 92 %  BP: (137-180)/() 169/74          Physical Exam  Vitals reviewed.   Constitutional:       Appearance: Normal appearance.   Cardiovascular:      Rate and Rhythm: Normal rate and regular rhythm.   Pulmonary:      Effort: Pulmonary effort is normal. No respiratory distress.   Abdominal:      Palpations: Abdomen is soft.      Tenderness: There is no abdominal tenderness.   Musculoskeletal:      Comments: S/p left AKA. Incision healing appropriately. Re dressed with gauze and ace wrap.    Skin:     General: Skin is warm.   Neurological:      General: No focal deficit present.      Mental Status: He is alert and oriented to person, place, and time.          Significant Labs:  ABGs: No results for input(s): "PH", "PCO2", "PO2", "HCO3", "POCSATURATED", "BE" in the last 48 hours.  BMP:   Recent Labs   Lab 01/04/24  0412   MG 2.0     Cardiac markers: No results for input(s): "CKMB", "CPKMB", "TROPONINT", "TROPONINI", "MYOGLOBIN" in the last 48 hours.  CBC:   Recent Labs   Lab 01/04/24  0413   WBC 8.83   RBC " "3.13*   HGB 9.7*   HCT 30.3*      MCV 97   MCH 31.0   MCHC 32.0     CMP: No results for input(s): "GLU", "CALCIUM", "ALBUMIN", "PROT", "NA", "K", "CO2", "CL", "BUN", "CREATININE", "ALKPHOS", "ALT", "AST", "BILITOT" in the last 48 hours.  eGFR: No results for input(s): "EGFRIFAFRICA", "EGFRCYSTC", "LABGLOM" in the last 48 hours.    Invalid input(s): "EGFRNON-AA"  Hemoglobin: No results for input(s): "HGBA1C" in the last 48 hours.    Significant Diagnostics:  I have reviewed all pertinent imaging results/findings within the past 24 hours.  "

## 2024-01-04 NOTE — PLAN OF CARE
[facility]    FACILITY TRANSFER ORDERS      Patient Name: Jamari Huerta III  YOB: 1960    PCP: Nataliya Anderson MD   PCP Address: [address] [address]  PCP Phone Number: [phone]  PCP Fax: [phone]    Encounter Date: 01/05/2024    Admit to: Skilled Nursing Facility     Vital Signs:  Routine    Diagnoses:   Active Hospital Problems    Diagnosis  POA    *Thrombosis of femoro-popliteal bypass graft [T82.866S]  Yes     SFA to above knee pop bypass        Resolved Hospital Problems   No resolved problems to display.       Allergies:  Review of patient's allergies indicates:   Allergen Reactions    Bupropion Swelling    Zyban [bupropion hcl (smoking deter)] Swelling    Morphine Itching and Rash       Diet: cardiac diet    Activities: Activity as tolerated    Goals of Care Treatment Preferences:  Code Status: Full Code    CONSULTS:    Physical Therapy to evaluate and treat.  and Occupational Therapy to evaluate and treat.    MISCELLANEOUS CARE:      WOUND CARE ORDERS  Yes: Surgical Wound:  Location: L AKA    Consult ET nurse        Apply the following to wound:   OK to keep open to air     Medications: Review discharge medications with patient and family and provide education.      Current Discharge Medication List        START taking these medications    Details   acetaminophen (TYLENOL) 500 MG tablet Take 2 tablets (1,000 mg total) by mouth every 8 (eight) hours as needed for Pain.  Qty: 30 tablet, Refills: 0      docusate sodium (COLACE) 100 MG capsule Take 1 capsule (100 mg total) by mouth 3 (three) times daily as needed for Constipation.  Qty: 21 capsule, Refills: 0      methocarbamoL (ROBAXIN) 500 MG Tab Take 2 tablets (1,000 mg total) by mouth every 6 (six) hours for 10 days  Qty: 56 tablet, Refills: 0      oxyCODONE (ROXICODONE) 5 MG immediate release tablet Take 1 tablet (5 mg total) by mouth every 6 (six) hours as needed for Pain (severe pain).  Qty:  12 tablet, Refills: 0    Comments: Quantity prescribed more than 7 day supply? No         Hydroxyzine HCL tablet 25 mg TID PRN for itching and anxiety Qty 30 refills 0        CONTINUE these medications which have NOT CHANGED    Details   carvediloL (COREG) 6.25 MG tablet Take 6.25 mg by mouth 2 (two) times daily with meals.      aspirin 81 MG Chew Take 81 mg by mouth once daily.      atorvastatin (LIPITOR) 80 MG tablet Take 1 tablet (80 mg total) by mouth once daily.  Qty: 90 tablet, Refills: 3    Associated Diagnoses: Mixed hyperlipidemia      divalproex (DEPAKOTE) 125 MG EC tablet Take 125 mg by mouth 3 (three) times daily.      pantoprazole (PROTONIX) 40 MG tablet Take 40 mg by mouth Daily. Indications: gastroesophageal reflux disease      QUEtiapine (SEROQUEL) 25 MG Tab 1 tab at bedtime  Qty: 90 tablet, Refills: 3    Associated Diagnoses: Moderate single current episode of major depressive disorder      sacubitriL-valsartan (ENTRESTO) 24-26 mg per tablet Take 1 tablet by mouth once daily.  Qty: 90 tablet, Refills: 3    Associated Diagnoses: Chronic systolic congestive heart failure                Immunizations Administered as of 1/5/2024       Name Date Dose VIS Date Route Exp Date    COVID-19, MRNA, LN-S, PF (Pfizer) (Purple Cap) 11/30/2021 0.3 mL -- -- --    Lot: [id number]     External: Auto Reconciled From Outside Source     COVID-19, MRNA, LN-S, PF (Pfizer) (Purple Cap) 3/19/2021 0.3 mL 1/5/2021 Intramuscular --    Site: Right deltoid     : [employer] Inc     Lot: [id number]     External: Auto Reconciled From Outside Source     Comment: Adminis     COVID-19, MRNA, LN-S, PF (Pfizer) (Purple Cap) 2/26/2021 0.3 mL 1/5/2021 Intramuscular --    Site: Right deltoid     : [employer]     Lot: [id number]     Comment: Adminis             This patient has had both covid vaccinations    Some patients may experience side effects after vaccination.  These may include fever, headache, muscle or joint aches.  Most  symptoms resolve with 24-48 hours and do not require urgent medical evaluation unless they persist for more than 72 hours or symptoms are concerning for an unrelated medical condition.          _________________________________  Ludin Chun MD  01/05/2024

## 2024-01-04 NOTE — NURSING
[facility] of Care Note  Dx: Thrombosis of femoro-popliteal bypass graft  [facility]     Shift Events: None     Goals of Care: Safety, pain control     Neuro: AAO; some deficits r/t prior CVA     Vital Signs: WDL     Respiratory: RA     Diet: Regular     Is patient tolerating current diet? Yes     GTTS: None     Urine Output/Bowel Movement: see output, multiple episodes of incontinence of bladder       Drains/Tubes/Tube Feeds (include total output/shift): None     Lines: R 22g PIV     Accuchecks: None     Skin: L AKA stump, staples intact     Fall Risk Score: 24     Activity level? 2person max assist to commode  LUE Immobile   Any scheduled procedures? None     Any safety concerns? Fall risk; previous fall this admission, impulsive; bed alarm enabled

## 2024-01-04 NOTE — PROGRESS NOTES
[facility]  Vascular Surgery  Progress Note    Patient Name: Jamrai Huerta III  MRN: [id number]  Admission Date: 12/21/2023  Primary Care Provider: Nataliya Anderson MD    Subjective:     Interval History: TAJON, NAD, VSS. Patient is afebrile, dressings are clean/dry/ and intact. Patient doing okay.   New complaints: None   Denies post op fever.      Post-Op Info:  Procedure(s) (LRB):  AMPUTATION, ABOVE KNEE (Left)   7 Days Post-Op     Medications:  Continuous Infusions:  Scheduled Meds:   acetaminophen  1,000 mg Oral TID    aspirin  81 mg Oral Daily    atorvastatin  80 mg Oral Daily    carvediloL  6.25 mg Oral BID WM    ceFAZolin (ANCEF) IVPB  2 g Intravenous Q8H    divalproex  125 mg Oral TID    heparin (porcine)  5,000 Units Subcutaneous Q8H    methocarbamoL  1,000 mg Oral Q6H    pantoprazole  40 mg Oral Daily    pregabalin  100 mg Oral Q8H    QUEtiapine  25 mg Oral QHS     PRN Meds:hydrOXYzine, melatonin, ondansetron, oxyCODONE **AND** [DISCONTINUED] oxyCODONE, promethazine, sodium chloride 0.9%     Objective:     Vital Signs (Most Recent):  Temp: 98.6 °F (37 °C) (01/04/24 0503)  Pulse: 71 (01/04/24 0503)  Resp: 19 (01/04/24 0515)  BP: (!) 169/74 (01/04/24 0503)  SpO2: (!) 92 % (01/04/24 0503) Vital Signs (24h Range):  Temp:  [97.4 °F (36.3 °C)-98.6 °F (37 °C)] 98.6 °F (37 °C)  Pulse:  [64-74] 71  Resp:  [18-20] 19  SpO2:  [92 %-97 %] 92 %  BP: (137-180)/() 169/74          Physical Exam  Vitals reviewed.   Constitutional:       Appearance: Normal appearance.   Cardiovascular:      Rate and Rhythm: Normal rate and regular rhythm.   Pulmonary:      Effort: Pulmonary effort is normal. No respiratory distress.   Abdominal:      Palpations: Abdomen is soft.      Tenderness: There is no abdominal tenderness.   Musculoskeletal:      Comments: S/p left AKA. Incision healing appropriately. Re dressed with gauze and ace wrap.    Skin:     General: Skin is warm.   Neurological:      General: No focal deficit  "present.      Mental Status: He is alert and oriented to person, place, and time.          Significant Labs:  ABGs: No results for input(s): "PH", "PCO2", "PO2", "HCO3", "POCSATURATED", "BE" in the last 48 hours.  BMP:   Recent Labs   Lab 01/04/24  0412   MG 2.0     Cardiac markers: No results for input(s): "CKMB", "CPKMB", "TROPONINT", "TROPONINI", "MYOGLOBIN" in the last 48 hours.  CBC:   Recent Labs   Lab 01/04/24  0413   WBC 8.83   RBC 3.13*   HGB 9.7*   HCT 30.3*      MCV 97   MCH 31.0   MCHC 32.0     CMP: No results for input(s): "GLU", "CALCIUM", "ALBUMIN", "PROT", "NA", "K", "CO2", "CL", "BUN", "CREATININE", "ALKPHOS", "ALT", "AST", "BILITOT" in the last 48 hours.  eGFR: No results for input(s): "EGFRIFAFRICA", "EGFRCYSTC", "LABGLOM" in the last 48 hours.    Invalid input(s): "EGFRNON-AA"  Hemoglobin: No results for input(s): "HGBA1C" in the last 48 hours.    Significant Diagnostics:  I have reviewed all pertinent imaging results/findings within the past 24 hours.  Assessment/Plan:     * Thrombosis of femoro-popliteal bypass graft  63 M with long history of PAD, s/p SFA to above the knee popliteal artery bypass, current smoker, presents as a transfer due to concern for decreased sensation with concern for rest pain secondary to occluded bypass graft now s/p angiogram, thrombolysis, and posterior tibial artery exploration. Unfortunately, no good options for revascularization of this patients left leg. After long discussion with patient this morning he is amenable to that surgery. Now s/p left AKA on 12/28. Stump healing appropriately.    - Continue aspirin, statin  - MM PO  - Replace dressing as needed  - Asa, statin   - Remind patient not to get out of bed without assistance   - OK for shower       Dispo: Patient will need SNF ASAP, pending auth at [facility]ya Serra DPM  Vascular Surgery  [facility]"

## 2024-01-04 NOTE — PT/OT/SLP PROGRESS
Physical Therapy Treatment/Co-Treatment with O.T.    Patient Name:  Jamari Huerta III   MRN:  [id number]    Recommendations:     Discharge Recommendations: Moderate Intensity Therapy  Discharge Equipment Recommendations: bath bench, wheelchair  Barriers to discharge: Inaccessible home and Decreased caregiver support    Assessment:     Jamari Huerta III is a 63 y.o. male admitted with a medical diagnosis of Thrombosis of femoro-popliteal bypass graft.  He presents with the following impairments/functional limitations: weakness, impaired endurance, impaired self care skills, impaired functional mobility, gait instability, impaired balance, decreased ROM, decreased lower extremity function, pain, impaired skin, edema, impaired cardiopulmonary response to activity, decreased coordination, decreased safety awareness . Patient needs coaxing at times to follow safety instructions. Patient was able to perform static standing balance with RW for support with mod assistance.    Rehab Prognosis: Fair; patient would benefit from acute skilled PT services to address these deficits and reach maximum level of function.    Recent Surgery: Procedure(s) (LRB):  AMPUTATION, ABOVE KNEE (Left) 7 Days Post-Op    Plan:     During this hospitalization, patient to be seen 4 x/week to address the identified rehab impairments via gait training, therapeutic activities, therapeutic exercises, neuromuscular re-education, wheelchair management/training and progress toward the following goals:    Plan of Care Expires:  01/28/24    Subjective     Chief Complaint: Unable to have a BM and unable to pee, NSG was notified.  Patient/Family Comments/goals: to get stronger, so He can mobilize again.  Pain/Comfort:  Pain Rating 1: 8/10  Location - Side 1: Left  Location - Orientation 1: generalized  Location 1: leg  Pain Addressed 1: Reposition, Distraction, Cessation of Activity  Pain Rating Post-Intervention 1:  (Did not rate)      Objective:      Communicated with NSG prior to session.  Patient found  on bedside commode  with telemetry upon PT entry to room.     General Precautions: Standard, fall  Orthopedic Precautions: LLE non weight bearing  Braces: N/A  Respiratory Status: Room air     Functional Mobility:  Bed Mobility:     Scooting: total assistance and of 2 persons  Sit to Supine: moderate assistance  Transfers:     Sit to Stand:  minimum assistance, moderate assistance, and of 2 persons with rolling walker      AM-PAC 6 CLICK MOBILITY  Turning over in bed (including adjusting bedclothes, sheets and blankets)?: 4  Sitting down on and standing up from a chair with arms (e.g., wheelchair, bedside commode, etc.): 2  Moving from lying on back to sitting on the side of the bed?: 3  Moving to and from a bed to a chair (including a wheelchair)?: 2  Need to walk in hospital room?: 1  Climbing 3-5 steps with a railing?: 1  Basic Mobility Total Score: 13       Treatment & Education:  Co-Treatment with O.T. Patient transferred from bedside commode to edge of bed with max assistance of 2, using squat pivot. Static standing balance with RW for support 3 x 20 to 25 secs with min assistance of 2. Patient requested to be transferred back to bed and repositioned for comfort with pillows.    Patient left HOB elevated with all lines intact and call button in reach..    GOALS:   Multidisciplinary Problems       Physical Therapy Goals          Problem: Physical Therapy    Goal Priority Disciplines Outcome Goal Variances Interventions   Physical Therapy Goal     PT, PT/OT Ongoing, Progressing     Description: Goals to be met by: 1/10     Patient will increase functional independence with mobility by performin. Supine to sit with Modified [place]  2. Sit to supine with Modified [place]  3. Sit to stand transfer with Contact Guard Assistance  4. Bed to chair transfer with Stand-by Assistance using squat pivot technique.   5. Gait  x 3 feet with Moderate  Assistance using LRAD.   6. Wheelchair propulsion x50 feet with contact guard Assistance using JUAN upper extremities and right leg.                          Time Tracking:     PT Received On: 01/04/24  PT Start Time: 1027     PT Stop Time: 1045  PT Total Time (min): 18 min     Billable Minutes: Neuromuscular Re-education 18    Treatment Type: Treatment  PT/PTA: PTA     Number of PTA visits since last PT visit: 1 01/04/2024

## 2024-01-05 VITALS
WEIGHT: 185 LBS | RESPIRATION RATE: 18 BRPM | DIASTOLIC BLOOD PRESSURE: 77 MMHG | OXYGEN SATURATION: 91 % | SYSTOLIC BLOOD PRESSURE: 167 MMHG | HEART RATE: 71 BPM | HEIGHT: 70 IN | BODY MASS INDEX: 26.48 KG/M2 | TEMPERATURE: 98 F

## 2024-01-05 PROCEDURE — 63600175 PHARM REV CODE 636 W HCPCS: Performed by: STUDENT IN AN ORGANIZED HEALTH CARE EDUCATION/TRAINING PROGRAM

## 2024-01-05 PROCEDURE — 97110 THERAPEUTIC EXERCISES: CPT | Mod: CQ

## 2024-01-05 PROCEDURE — 25000003 PHARM REV CODE 250

## 2024-01-05 PROCEDURE — 97530 THERAPEUTIC ACTIVITIES: CPT | Mod: CQ

## 2024-01-05 PROCEDURE — 25000003 PHARM REV CODE 250: Performed by: STUDENT IN AN ORGANIZED HEALTH CARE EDUCATION/TRAINING PROGRAM

## 2024-01-05 PROCEDURE — 97535 SELF CARE MNGMENT TRAINING: CPT

## 2024-01-05 RX ADMIN — CARVEDILOL 6.25 MG: 6.25 TABLET, FILM COATED ORAL at 09:01

## 2024-01-05 RX ADMIN — OXYCODONE HYDROCHLORIDE 5 MG: 5 TABLET ORAL at 05:01

## 2024-01-05 RX ADMIN — OXYCODONE HYDROCHLORIDE 5 MG: 5 TABLET ORAL at 09:01

## 2024-01-05 RX ADMIN — HEPARIN SODIUM 5000 UNITS: 5000 INJECTION INTRAVENOUS; SUBCUTANEOUS at 05:01

## 2024-01-05 RX ADMIN — ASPIRIN 81 MG CHEWABLE TABLET 81 MG: 81 TABLET CHEWABLE at 09:01

## 2024-01-05 RX ADMIN — PREGABALIN 100 MG: 50 CAPSULE ORAL at 05:01

## 2024-01-05 RX ADMIN — DIVALPROEX SODIUM 125 MG: 125 TABLET, DELAYED RELEASE ORAL at 09:01

## 2024-01-05 RX ADMIN — HYDROXYZINE HYDROCHLORIDE 25 MG: 25 TABLET ORAL at 09:01

## 2024-01-05 RX ADMIN — ATORVASTATIN CALCIUM 80 MG: 40 TABLET, FILM COATED ORAL at 09:01

## 2024-01-05 RX ADMIN — ACETAMINOPHEN 1000 MG: 325 TABLET ORAL at 09:01

## 2024-01-05 RX ADMIN — METHOCARBAMOL 1000 MG: 500 TABLET ORAL at 05:01

## 2024-01-05 NOTE — SUBJECTIVE & OBJECTIVE
"  Medications:  Continuous Infusions:  Scheduled Meds:   acetaminophen  1,000 mg Oral TID    aspirin  81 mg Oral Daily    atorvastatin  80 mg Oral Daily    carvediloL  6.25 mg Oral BID WM    ceFAZolin (ANCEF) IVPB  2 g Intravenous Q8H    divalproex  125 mg Oral TID    heparin (porcine)  5,000 Units Subcutaneous Q8H    methocarbamoL  1,000 mg Oral Q6H    pantoprazole  40 mg Oral Daily    pregabalin  100 mg Oral Q8H    QUEtiapine  25 mg Oral QHS     PRN Meds:hydrOXYzine, melatonin, ondansetron, oxyCODONE **AND** [DISCONTINUED] oxyCODONE, promethazine, sodium chloride 0.9%     Objective:     Vital Signs (Most Recent):  Temp: 98.2 °F (36.8 °C) (01/05/24 0723)  Pulse: 71 (01/05/24 0723)  Resp: 20 (01/05/24 0723)  BP: (!) 179/78 (01/05/24 0723)  SpO2: (!) 91 % (01/05/24 0723) Vital Signs (24h Range):  Temp:  [97.2 °F (36.2 °C)-98.4 °F (36.9 °C)] 98.2 °F (36.8 °C)  Pulse:  [67-74] 71  Resp:  [12-20] 20  SpO2:  [91 %-94 %] 91 %  BP: (143-188)/(66-81) 179/78          Physical Exam  Vitals reviewed.   Constitutional:       Appearance: Normal appearance.   Cardiovascular:      Rate and Rhythm: Normal rate and regular rhythm.   Pulmonary:      Effort: Pulmonary effort is normal. No respiratory distress.   Abdominal:      Palpations: Abdomen is soft.      Tenderness: There is no abdominal tenderness.   Musculoskeletal:      Comments: S/p left AKA. Incision healing appropriately. Re dressed with gauze and ace wrap.    Skin:     General: Skin is warm.   Neurological:      General: No focal deficit present.      Mental Status: He is alert and oriented to person, place, and time.          Significant Labs:  CBC:   Recent Labs   Lab 01/04/24  0413   WBC 8.83   RBC 3.13*   HGB 9.7*   HCT 30.3*      MCV 97   MCH 31.0   MCHC 32.0     CMP: No results for input(s): "GLU", "CALCIUM", "ALBUMIN", "PROT", "NA", "K", "CO2", "CL", "BUN", "CREATININE", "ALKPHOS", "ALT", "AST", "BILITOT" in the last 48 hours.    Significant Diagnostics:  I " have reviewed all pertinent imaging results/findings within the past 24 hours.

## 2024-01-05 NOTE — PT/OT/SLP PROGRESS
Occupational Therapy   Treatment    Name: Jamari Huerta III  MRN: [id number]  Admitting Diagnosis:  Thrombosis of femoro-popliteal bypass graft  8 Days Post-Op    Recommendations:     Discharge Recommendations: Moderate Intensity Therapy  Discharge Equipment Recommendations:  bath bench, wheelchair  Barriers to discharge:  Decreased caregiver support    Assessment:   CO-TX with PT for pt safety and max participation with both disciplines.     Jamari Huerta III is a 63 y.o. male with a medical diagnosis of Thrombosis of femoro-popliteal bypass graft. Performance deficits affecting function are weakness, impaired endurance, impaired self care skills, impaired functional mobility, gait instability, impaired balance, decreased ROM, decreased lower extremity function, decreased upper extremity function, pain, decreased safety awareness, impaired cardiopulmonary response to activity, decreased coordination. Pt had good pariticpation today. Pt was found feeding self (I) however, his gown was removed and down at his foot. Pt still impulsive but is motivated to participate.    Rehab Prognosis:  Good; patient would benefit from acute skilled OT services to address these deficits and reach maximum level of function.       Plan:     Patient to be seen 4 x/week to address the above listed problems via self-care/home management, therapeutic activities, therapeutic exercises, neuromuscular re-education  Plan of Care Expires: 01/28/24  Plan of Care Reviewed with: patient    Subjective     Chief Complaint: none stated  Patient/Family Comments/goals: return home  Pain/Comfort:  Pain Rating 1: 0/10    Objective:     Communicated with: [place] prior to session.  Patient found HOB elevated eating lunch with telemetry upon OT entry to room.    General Precautions: Standard, fall    Orthopedic Precautions:LLE non weight bearing  Braces: N/A  Respiratory Status: Room air     Occupational Performance:     Bed Mobility:    Patient completed  Rolling/Turning to Left with  contact guard assistance and minimum assistance  Patient completed Supine to Sit with minimum assistance  Patient completed Sit to Supine with contact guard assistance     Functional Mobility/Transfers:  Patient completed Sit <> Stand Transfer with minimum assistance and of 2 persons  with  rolling walker   Patient completed Bed <> BSC Transfer using Stand Pivot technique with moderate assistance with hand-held assist  Functional Mobility: Pt taking hopping side step using RW c/ Min A for steering assist.    Activities of Daily Living:  Feeding:  modified independence    Bathing: stand by assistance and setup A at bedside    Upper Body Dressing: total assistance to re-don gown      [facility] 6 Click ADL: 19    Treatment & Education:  Pt educated on role and purpose of therapy  Pt educated on benefits of OOB activity for fxnl mobility  Pt hygiene mgmt for limb  Pt educated on self advocacy     Patient left HOB elevated with all lines intact, call button in reach, and nsg notified    GOALS:   Multidisciplinary Problems       Occupational Therapy Goals          Problem: Occupational Therapy    Goal Priority Disciplines Outcome Interventions   Occupational Therapy Goal     OT, PT/OT Ongoing, Progressing    Description: Goals to be met by: 1/28/2024     Patient will increase functional independence with ADLs by performing:    LE Dressing with Stand-by Assistance. Ongoing   Grooming while standing at sink with Set-up Assistance and Supervision.Ongoing   Toileting from toilet with Supervision for hygiene and clothing management. Ongoing   Toilet transfer to toilet with Stand-by Assistance.Ongoing                          Time Tracking:     OT Date of Treatment: 01/05/24  OT Start Time: 1203  OT Stop Time: 1228  OT Total Time (min): 25 min    Billable Minutes:Self Care/Home Management 25    OT/BRUCE: OT          1/5/2024

## 2024-01-05 NOTE — PROGRESS NOTES
[facility]  Vascular Surgery  Progress Note    Patient Name: Jamari Huerta III  MRN: [id number]  Admission Date: 12/21/2023  Primary Care Provider: Nataliya Anderson MD    Subjective:     Interval History: NAEON, patient doing well this morning, no complaints, continues to be medically ready for transfer to [facility].     Post-Op Info:  Procedure(s) (LRB):  AMPUTATION, ABOVE KNEE (Left)   8 Days Post-Op     Medications:  Continuous Infusions:  Scheduled Meds:   acetaminophen  1,000 mg Oral TID    aspirin  81 mg Oral Daily    atorvastatin  80 mg Oral Daily    carvediloL  6.25 mg Oral BID WM    ceFAZolin (ANCEF) IVPB  2 g Intravenous Q8H    divalproex  125 mg Oral TID    heparin (porcine)  5,000 Units Subcutaneous Q8H    methocarbamoL  1,000 mg Oral Q6H    pantoprazole  40 mg Oral Daily    pregabalin  100 mg Oral Q8H    QUEtiapine  25 mg Oral QHS     PRN Meds:hydrOXYzine, melatonin, ondansetron, oxyCODONE **AND** [DISCONTINUED] oxyCODONE, promethazine, sodium chloride 0.9%     Objective:     Vital Signs (Most Recent):  Temp: 98.2 °F (36.8 °C) (01/05/24 0723)  Pulse: 71 (01/05/24 0723)  Resp: 20 (01/05/24 0723)  BP: (!) 179/78 (01/05/24 0723)  SpO2: (!) 91 % (01/05/24 0723) Vital Signs (24h Range):  Temp:  [97.2 °F (36.2 °C)-98.4 °F (36.9 °C)] 98.2 °F (36.8 °C)  Pulse:  [67-74] 71  Resp:  [12-20] 20  SpO2:  [91 %-94 %] 91 %  BP: (143-188)/(66-81) 179/78          Physical Exam  Vitals reviewed.   Constitutional:       Appearance: Normal appearance.   Cardiovascular:      Rate and Rhythm: Normal rate and regular rhythm.   Pulmonary:      Effort: Pulmonary effort is normal. No respiratory distress.   Abdominal:      Palpations: Abdomen is soft.      Tenderness: There is no abdominal tenderness.   Musculoskeletal:      Comments: S/p left AKA. Incision healing appropriately. Re dressed with gauze and ace wrap.    Skin:     General: Skin is warm.   Neurological:      General: No focal deficit present.      Mental Status:  "He is alert and oriented to person, place, and time.          Significant Labs:  CBC:   Recent Labs   Lab 01/04/24  0413   WBC 8.83   RBC 3.13*   HGB 9.7*   HCT 30.3*      MCV 97   MCH 31.0   MCHC 32.0     CMP: No results for input(s): "GLU", "CALCIUM", "ALBUMIN", "PROT", "NA", "K", "CO2", "CL", "BUN", "CREATININE", "ALKPHOS", "ALT", "AST", "BILITOT" in the last 48 hours.    Significant Diagnostics:  I have reviewed all pertinent imaging results/findings within the past 24 hours.  Assessment/Plan:     * Thrombosis of femoro-popliteal bypass graft  63 M with long history of PAD, s/p SFA to above the knee popliteal artery bypass, current smoker, presents as a transfer due to concern for decreased sensation with concern for rest pain secondary to occluded bypass graft now s/p angiogram, thrombolysis, and posterior tibial artery exploration. Unfortunately, no good options for revascularization of this patients left leg. After long discussion with patient this morning he is amenable to that surgery. Now s/p left AKA on 12/28. Stump healing appropriately.    - Continue aspirin, statin  - MM PO  - Replace dressing as needed  - Asa, statin   - Remind patient not to get out of bed without assistance   - OK for shower       Dispo: Patient will need SNF ASAP        Sarah Condon NP  Vascular Surgery  [facility]  "

## 2024-01-05 NOTE — HOSPITAL COURSE
Mr. Huerta presented to [facility] on the morning of 12/28/2023 as a transfer to the ED for concern for acute limb. A thrombolysis catheter was placed and was unable to remove the clot in his leg. He ultimately required a left above knee amputation. The procedure was performed without complication and he was transferred to the floor for further post op care and monitoring. His postoperative course was uneventful and he progressed according to plan.  His pain was controlled with a combination of IV and PO multimodal pain medications. His diet has been advanced throughout his hospital stay. He is now tolerating a regular diet, pain is well controlled with PO pain medication, and he is voiding appropriately. He now meets all criteria for discharge to a skilled nursing facility.

## 2024-01-05 NOTE — PROGRESS NOTES
"[facility]  Adult Nutrition  Progress Note    SUMMARY       Recommendations    1) Continue regular diet - add low sodium restrictions as needed per MD   2) Continue Boost Plus TID   3) Following    Goals: Meet % een/epn by next RD f/u  Nutrition Goal Status: new  Communication of RD Recs: other (comment) (POC)    Assessment and Plan    No nutrition diagnosis at this time    Reason for Assessment    Reason For Assessment: length of stay  Diagnosis: other (see comments) (Thrombosis of femoro-popliteal bypass graft)  Relevant Medical History: CAD, CVA, CKD, HTN, HLD  Interdisciplinary Rounds: did not attend  General Information Comments: LOS. Pt noted with 100% intake of meals. No N/V/D/C at this time. Noted w/ 15% wt loss in 2 weeks. (Significant) Unsure if d/t fluid status at this time. Unable to completed NFPE at this time d/t time restraints. RD to complete NFPE at follow up. Awaiting SNF placement.     Nutrition Discharge Planning: adequate intake    Nutrition Risk Screen    Nutrition Risk Screen: no indicators present    Nutrition/Diet History    Spiritual, Cultural Beliefs, Faith Practices, Values that Affect Care: no  Food Allergies: NKFA    Anthropometrics    Temp: 98.1 °F (36.7 °C)  Height Method: Estimated  Height: 5' 10" (177.8 cm)  Height (inches): 70 in  Weight Method: Bed Scale  Weight: 83.9 kg (185 lb)  Weight (lb): 185 lb  Ideal Body Weight (IBW), Male: 166 lb  % Ideal Body Weight, Male (lb): 111.45 %  BMI (Calculated): 26.5  BMI Grade: 25 - 29.9 - overweight       Lab/Procedures/Meds    Pertinent Labs Reviewed: reviewed  Pertinent Labs Comments: H/h: 9.7/30.3  Pertinent Medications Reviewed: reviewed  Pertinent Medications Comments: Atorvastatin, carvedilol, heparin, pantoprazole    Physical Findings/Assessment         Estimated/Assessed Needs    Weight Used For Calorie Calculations: 83.9 kg (185 lb)  Energy Calorie Requirements (kcal): 1645 (MSJ)  Energy Need Method: Stark-St " Sonia  Protein Requirements: 100 (1.2 g/kg)  Weight Used For Protein Calculations: 83.9 kg (185 lb)     Estimated Fluid Requirement Method: RDA Method  RDA Method (mL): 1645         Nutrition Prescription Ordered    Current Diet Order: Regular  Oral Nutrition Supplement: Boost Plus chocolate    Evaluation of Received Nutrient/Fluid Intake    I/O: -661 ml since admit  Comments: LBM 1/2  % Intake of Estimated Energy Needs: 75 - 100 %  % Meal Intake: 75 - 100 %    Nutrition Risk    Level of Risk/Frequency of Follow-up: low (f/u 1x/week)     Monitor and Evaluation    Food and Nutrient Intake: energy intake, food and beverage intake  Food and Nutrient Adminstration: diet order  Physical Activity and Function: nutrition-related ADLs and IADLs  Anthropometric Measurements: height/length, weight, weight change, body mass index  Biochemical Data, Medical Tests and Procedures: electrolyte and renal panel, gastrointestinal profile, glucose/endocrine profile, inflammatory profile, lipid profile     Nutrition Follow-Up    RD Follow-up?: Yes    Michelle Gaines RD, LDN

## 2024-01-05 NOTE — PLAN OF CARE
Recommendations    1) Continue regular diet - add low sodium restrictions as needed per MD   2) Continue Boost Plus TID   3) Following    Goals: Meet % een/epn by next RD f/u  Nutrition Goal Status: new  Communication of RD Recs: other (comment) (POC)

## 2024-01-05 NOTE — PT/OT/SLP PROGRESS
Physical Therapy Treatment    Patient Name:  Jamari Huerta III   MRN:  [id number]    Recommendations:     Discharge Recommendations: Moderate Intensity Therapy  Discharge Equipment Recommendations: bath bench, wheelchair  Barriers to discharge: Decreased caregiver support    Assessment:     Jamari Huerta III is a 63 y.o. male admitted with a medical diagnosis of Thrombosis of femoro-popliteal bypass graft.  He presents with the following impairments/functional limitations: weakness, impaired endurance, impaired self care skills, impaired functional mobility, gait instability, impaired balance, decreased ROM, decreased lower extremity function, pain, impaired skin, edema, impaired cardiopulmonary response to activity, decreased coordination, decreased safety awareness .   presents with  improved overall functional mobility requiring decreased assistance and increased activity tolerance to perform functional mobility.Pt would continue to benefit from skilled PT to address overall functional mobility, to return to functional baseline and goals. Goals remain appropriate.      Rehab Prognosis: Good; patient would benefit from acute skilled PT services to address these deficits and reach maximum level of function.    Recent Surgery: Procedure(s) (LRB):  AMPUTATION, ABOVE KNEE (Left) 8 Days Post-Op    Plan:     During this hospitalization, patient to be seen 4 x/week to address the identified rehab impairments via gait training, therapeutic activities, therapeutic exercises, neuromuscular re-education, wheelchair management/training and progress toward the following goals:    Plan of Care Expires:  01/28/24    Subjective     Chief Complaint: L LE pain  Patient/Family Comments/goals: I might need to use the bathroom  Pain/Comfort:  Pain Rating 1: 0/10  Location - Side 1: Left  Location - Orientation 1: generalized  Location 1: leg  Pain Addressed 1: Reposition, Distraction, Cessation of Activity      Objective:      Communicated with RN prior to session.  Patient found HOB elevated with telemetry upon PT entry to room.     General Precautions: Standard, fall  Orthopedic Precautions: LLE non weight bearing  Braces: N/A  Respiratory Status: Room air     Functional Mobility:  Bed Mobility:     Rolling Right: contact guard assistance  Supine to Sit: minimum assistance  Sit to Supine: contact guard assistance  Transfers:     Sit to Stand:  minimum assistance and of 2 persons with rolling walker  Toilet Transfer: moderate assistance with  no AD  using  Stand Pivot  Gait: pt able to take   3 hopping side step  to R x 2 trials using RW with Min A for steering assist of RW      AM-PAC 6 CLICK MOBILITY  Turning over in bed (including adjusting bedclothes, sheets and blankets)?: 4  Sitting down on and standing up from a chair with arms (e.g., wheelchair, bedside commode, etc.): 2  Moving from lying on back to sitting on the side of the bed?: 3  Moving to and from a bed to a chair (including a wheelchair)?: 2  Need to walk in hospital room?: 2  Climbing 3-5 steps with a railing?: 1  Basic Mobility Total Score: 14       Treatment & Education:  Therapist provided instruction and educated of  patient on progress, safety,d/c,PT POC,   proper body mechanics, energy conservation, and fall prevention strategies during tasks listed above, on the effects of prolonged immobility and the importance of performing OOB activity and exercises to promote healing and reduce recovery time   L LE AAROM hip flexion and hip abd  Updated white board with appropriate PT mobility information for medical team notification   Bedside table in front of patient and area set up for function, convenience, and safety. RN aware of patient's mobility needs and status. Questions/concerns addressed within PTA scope of practice; patient  with no further questions. Time was provided for active listening, discussion of health disposition, and discussion of safe discharge.  Pt?verbalized?agreement   Co-treatment performed with OT due to patient's complexity and benefit of 2 skilled therapists to facilitate functional and safe performance, accommodate patient's activity tolerance, and maximize patient's participation in therapy.     Patient left HOB elevated with all lines intact, call button in reach, and nsg notified..      GOALS:   Multidisciplinary Problems       Physical Therapy Goals          Problem: Physical Therapy    Goal Priority Disciplines Outcome Goal Variances Interventions   Physical Therapy Goal     PT, PT/OT Ongoing, Progressing     Description: Goals to be met by: 1/10     Patient will increase functional independence with mobility by performin. Supine to sit with Modified [place]  2. Sit to supine with Modified [place]  3. Sit to stand transfer with Contact Guard Assistance  4. Bed to chair transfer with Stand-by Assistance using squat pivot technique.   5. Gait  x 3 feet with Moderate Assistance using LRAD.   6. Wheelchair propulsion x50 feet with contact guard Assistance using JUAN upper extremities and right leg.                          Time Tracking:     PT Received On: 24  PT Start Time: 1203     PT Stop Time: 1229  PT Total Time (min): 26 min     Billable Minutes: Therapeutic Activity 15 and Therapeutic Exercise 11    Treatment Type: Treatment  PT/PTA: PTA     Number of PTA visits since last PT visit: 2     2024

## 2024-01-05 NOTE — DISCHARGE SUMMARY
[facility]  Vascular Surgery  Discharge Summary      Patient Name: Jamari Huerta III  MRN: [id number]  Admission Date: 12/21/2023  Hospital Length of Stay: 15 days  Discharge Date and Time:  01/05/2024 5:53 PM  Attending Physician: Sarah att. providers found   Discharging Provider: Ludin Chun MD  Primary Care Provider: Nataliya Anderson MD    HPI:   Jamari Huerta III is a 63 y.o. male with a PMH of CAD, CVA, CKD, LLE DVT in 2002, not currently on anticoagulation presenting to [facility] [place] Emergency Department via EMS as a transfer from [facility] with a fem pop bypass occlusion in [facility], coming for vascular surgery consult, extremity cold to touch, sensation decreased. Patient says the pain woke him up in the middle of the night.  Pain is located to his left calf and left anterior lower leg.     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 Pt reports that around midnight last night he had sudden 10/10 pain that woke him up from his sleep. He states that over the next couple of hours his pain had completely resolved. Upon interview, pt wanted to use the restroom and walked to the restroom himself. He reported some calf pain and shin pain afterwards on his walk back to the bed (~10ft). The calf and shin pain has been happening for months. He reports that he has been having decreasing sensation on the left compared to the right for months. He admits that his left lower extremity is more swollen than the right. Admits that his left leg is slowly becoming weaker over months. Pt reports that he takes ASA and continues to smoke. Has a small wound in the left great toe, reports he was trying to cut his nails with scissors and cut his skin, this has been healing and only happened a week ago.    Procedure(s) (LRB):  AMPUTATION, ABOVE KNEE (Left)     Hospital Course: Mr. Huerta presented to [facility] [place] on the morning of 12/28/2023 as a transfer to the ED for concern for acute limb. A thrombolysis catheter was placed and was unable to remove the clot in his leg.  "He ultimately required a left above knee amputation. The procedure was performed without complication and he was transferred to the floor for further post op care and monitoring. His postoperative course was uneventful and he progressed according to plan.  His pain was controlled with a combination of IV and PO multimodal pain medications. His diet has been advanced throughout his hospital stay. He is now tolerating a regular diet, pain is well controlled with PO pain medication, and he is voiding appropriately. He now meets all criteria for discharge to a skilled nursing facility.    Goals of Care Treatment Preferences:  Code Status: Full Code      Consults:   Consults (From admission, onward)          Status Ordering Provider     Inpatient consult to Midline team  Once        Provider:  (Not yet assigned)    Completed NATALIE BARNES II     Inpatient consult to Vascular Surgery  Once        Provider:  (Not yet assigned)    Completed INOCENCIO WALTERS            Significant Diagnostic Studies: Labs: [facility] No results for input(s): "NA", "K", "CL", "CO2", "GLU", "BUN", "CREATININE", "CALCIUM", "PROT", "ALBUMIN", "BILITOT", "ALKPHOS", "AST", "ALT", "ANIONGAP", "ESTGFRAFRICA", "EGFRNONAA" in the last 48 hours. and CBC   Recent Labs   Lab 01/04/24  0413   WBC 8.83   HGB 9.7*   HCT 30.3*          Pending Diagnostic Studies:       None          Final Active Diagnoses:    Diagnosis Date Noted POA    PRINCIPAL PROBLEM:  Thrombosis of femoro-popliteal bypass graft [T82.868A] 12/21/2023 Yes      Problems Resolved During this Admission:      Discharged Condition: good    Disposition: Skilled Nursing Facility    Follow Up:   Follow-up Information       NATALIE Barnes II, MD Follow up in 3 week(s).    Specialty: Vascular Surgery  Contact information:  [address] [facility] [address] [phone]                             Patient Instructions:      Ambulatory referral/consult to Smoking Cessation " Program   Standing Status: Future   Referral Priority: Routine Referral Type: Consultation   Referral Reason: Specialty Services Required   Requested Specialty: CTTS   Number of Visits Requested: 1     Diet Cardiac     Notify your health care provider if you experience any of the following:     Notify your health care provider if you experience any of the following:  increased confusion or weakness     Notify your health care provider if you experience any of the following:  persistent dizziness, light-headedness, or visual disturbances     Notify your health care provider if you experience any of the following:  worsening rash     Notify your health care provider if you experience any of the following:  severe persistent headache     Notify your health care provider if you experience any of the following:  difficulty breathing or increased cough     Notify your health care provider if you experience any of the following:  redness, tenderness, or signs of infection (pain, swelling, redness, odor or green/yellow discharge around incision site)     Notify your health care provider if you experience any of the following:  severe uncontrolled pain     Notify your health care provider if you experience any of the following:  persistent nausea and vomiting or diarrhea     Notify your health care provider if you experience any of the following:  temperature >100.4     No dressing needed     Reason for not Prescribing Nicotine Replacement     Order Specific Question Answer Comments   Reason for not Prescribing: Patient refused      Activity as tolerated     Medications:  Reconciled Home Medications:      Medication List        START taking these medications      acetaminophen 500 MG tablet  Commonly known as: TYLENOL  Take 2 tablets (1,000 mg total) by mouth every 8 (eight) hours as needed for Pain.     docusate sodium 100 MG capsule  Commonly known as: COLACE  Take 1 capsule (100 mg total) by mouth 3 (three) times daily as  needed for Constipation.     methocarbamoL 500 MG Tab  Commonly known as: ROBAXIN  Take 2 tablets (1,000 mg total) by mouth every 6 (six) hours for 10 days     oxyCODONE 5 MG immediate release tablet  Commonly known as: ROXICODONE  Take 1 tablet (5 mg total) by mouth every 6 (six) hours as needed for Pain (severe pain).            CONTINUE taking these medications      aspirin 81 MG Chew  Take 81 mg by mouth once daily.     atorvastatin 80 MG tablet  Commonly known as: LIPITOR  Take 1 tablet (80 mg total) by mouth once daily.     carvediloL 6.25 MG tablet  Commonly known as: COREG  Take 6.25 mg by mouth 2 (two) times daily with meals.     divalproex 125 MG EC tablet  Commonly known as: DEPAKOTE  Take 125 mg by mouth 3 (three) times daily.     ENTRESTO 24-26 mg per tablet  Generic drug: sacubitriL-valsartan  Take 1 tablet by mouth once daily.     pantoprazole 40 MG tablet  Commonly known as: PROTONIX  Take 40 mg by mouth Daily. Indications: gastroesophageal reflux disease     QUEtiapine 25 MG Tab  Commonly known as: SEROQUEL  1 tab at bedtime              Ludin Chun MD  Vascular Surgery  [facility], [place]

## 2024-01-05 NOTE — ASSESSMENT & PLAN NOTE
63 M with long history of PAD, s/p SFA to above the knee popliteal artery bypass, current smoker, presents as a transfer due to concern for decreased sensation with concern for rest pain secondary to occluded bypass graft now s/p angiogram, thrombolysis, and posterior tibial artery exploration. Unfortunately, no good options for revascularization of this patients left leg. After long discussion with patient this morning he is amenable to that surgery. Now s/p left AKA on 12/28. Stump healing appropriately.    - Continue aspirin, statin  - MM PO  - Replace dressing as needed  - Asa, statin   - Remind patient not to get out of bed without assistance   - OK for shower       Dispo: Patient will need SNF ASAP

## 2024-01-05 NOTE — PLAN OF CARE
Pt to be discharged to  [facility] in [place].  [facility] in [place]. Report called at 1423. Report given to Jenifer SANDERS. Pt is going to room #

## 2024-01-09 NOTE — ADDENDUM NOTE
Addendum  created 01/09/24 0850 by Froylan Mukherjee MD    Attestation recorded in Intraprocedure, Intraprocedure Attestations filed

## 2024-01-25 ENCOUNTER — TELEPHONE (OUTPATIENT)
Dept: VASCULAR SURGERY | Facility: CLINIC | Age: 64
End: 2024-01-25
Payer: MEDICARE

## 2024-01-25 NOTE — TELEPHONE ENCOUNTER
"Spoke with Carolyn(sister) in regards to Mr Deanna appointment , to have the staples removed from his BKA on Monday1/29/2024 with Dr Mitchell was canceled.  Ms Leon states." The appointment was canceled because  my brother is now at a nursing home in [place]. His staples was removed by the doctor on yesterday,and his care will be continued at the nursing home. He will not be returning to [facility].  I replied, " I will relay this information to Dr ROBINA Mitchell. "  "

## 2024-02-05 PROBLEM — I63.511 CEREBROVASCULAR ACCIDENT (CVA) DUE TO OCCLUSION OF RIGHT MIDDLE CEREBRAL ARTERY: Status: RESOLVED | Noted: 2023-07-09 | Resolved: 2024-02-05

## (undated) DEVICE — GAUZE WOVEN STRL 12-PLY 4X4IN

## (undated) DEVICE — PAD DEFIB CADENCE ADULT R2

## (undated) DEVICE — OMNIPAQUE CONTRAST 350MG/100ML

## (undated) DEVICE — CONTRAST VISIPAQUE 150ML

## (undated) DEVICE — GUIDEWIRE STF .035X260CM ANG

## (undated) DEVICE — BLADE HEAVY DUTY SAGITTAL

## (undated) DEVICE — BANDAGE ROLL COTTN 4.5INX4.1YD

## (undated) DEVICE — APPLICATOR CHLORAPREP ORN 26ML

## (undated) DEVICE — TRAY SKIN SCRUB WET PREMIUM

## (undated) DEVICE — SHEATH INTRODUCER 5F 10CM

## (undated) DEVICE — DEVICE MYNX GRIP 6/7F

## (undated) DEVICE — TRAY FOLEY 16FR INFECTION CONT

## (undated) DEVICE — BANDAGE ESMARK ELASTIC ST 6X9

## (undated) DEVICE — STAPLER SKIN PROXIMATE WIDE

## (undated) DEVICE — DRAPE T EXTRM SURG 121X128X90

## (undated) DEVICE — SHEATH INTRODUCER 6FR 11CM

## (undated) DEVICE — COVER INSTR ELASTIC BAND 40X20

## (undated) DEVICE — SUT 2-0 12-18IN SILK

## (undated) DEVICE — TOWEL OR DISP STRL BLUE 4/PK

## (undated) DEVICE — SUT CTD VICRYL VIL BR SH 27

## (undated) DEVICE — SPONGE LAP 18X18 PREWASHED

## (undated) DEVICE — DRAPE INCISE IOBAN 2 23X17IN

## (undated) DEVICE — SYS LABEL CORRECT MED

## (undated) DEVICE — Device

## (undated) DEVICE — GAUZE SPONGE 4X4 12PLY

## (undated) DEVICE — TRAY MINOR GEN SURG OMC

## (undated) DEVICE — BANDAGE ELAS STRTCH 4X4.5YD

## (undated) DEVICE — VISE RADIFOCUS MULTI TORQUE

## (undated) DEVICE — SUT BONE WAX 2.5 GRMS 12/BX

## (undated) DEVICE — DECANTER FLUID TRNSF WHITE 9IN

## (undated) DEVICE — CATH 4 FR OMNI FLUSH 65CM

## (undated) DEVICE — SUT ETHILON 3-0 PS2 18 BLK

## (undated) DEVICE — BLADE SAW STRNM 8.66X40.34X.6

## (undated) DEVICE — SUT MONOCRYL 2-0 CT-1 VIL

## (undated) DEVICE — DRESSING TRANS 4X4 TEGADERM

## (undated) DEVICE — SUT PROLENE 2-0 SH 36IN BLU

## (undated) DEVICE — BANDAGE ELAS SOFTWRAP ST 6X5YD

## (undated) DEVICE — SUT PROLENE 0-36 V-7

## (undated) DEVICE — CATH BLLN SEEKER .035IN 135CM

## (undated) DEVICE — SET MICROPUNCTURE

## (undated) DEVICE — SHEATH GUID FLX ANSEL 6FR 45CM

## (undated) DEVICE — SPONGE COTTON TRAY 4X4IN

## (undated) DEVICE — TRAY CATH LAB OMC

## (undated) DEVICE — DRESSING XEROFORM 5X9IN

## (undated) DEVICE — INFLATOR ENCORE 26 BLLN INFL

## (undated) DEVICE — GLIDE CATH ANGLED 4FR 65CM

## (undated) DEVICE — GUIDEWIRE STF .035X180CM ANG

## (undated) DEVICE — DRAPE U SPLIT SHEET 54X76IN

## (undated) DEVICE — SYR MARK 7 ARTERION 150ML

## (undated) DEVICE — SUT 3-0 12-18IN SILK

## (undated) DEVICE — ELECTRODE REM PLYHSV RETURN 9

## (undated) DEVICE — SOL NS 1000CC

## (undated) DEVICE — DRESSING GAUZE XEROFORM 5X9

## (undated) DEVICE — GAUZE FLUFF XXLG 36X36 2 PLY

## (undated) DEVICE — CATH ULTRAVERSE 035 7X60X130

## (undated) DEVICE — CLOSURE SKIN STERI STRIP 1/2X4

## (undated) DEVICE — COVERS PROBE NR-48 STERILE

## (undated) DEVICE — SOL 9P NACL IRR PIC IL

## (undated) DEVICE — SUT SILK 2-0 SH 18IN BLACK

## (undated) DEVICE — INTRODUCER VASC RADPQ 6FRX10CM

## (undated) DEVICE — DRESSING TRANS 8X12 TEGADERM

## (undated) DEVICE — STOCKINET TUBULAR 1 PLY 6X60IN

## (undated) DEVICE — TOURNIQUET SB QC DP 34X4IN

## (undated) DEVICE — PAD ABD 8X10 STERILE

## (undated) DEVICE — COVER LIGHT HANDLE 80/CA

## (undated) DEVICE — SPONGE DERMACEA 4X4IN 12PLY

## (undated) DEVICE — SUT 2/0 36IN COATED VICRYL

## (undated) DEVICE — SUT VICRYL 2-0 36 CT-1

## (undated) DEVICE — STOPCOCK 3-WAY